# Patient Record
Sex: MALE | Employment: UNEMPLOYED | URBAN - METROPOLITAN AREA
[De-identification: names, ages, dates, MRNs, and addresses within clinical notes are randomized per-mention and may not be internally consistent; named-entity substitution may affect disease eponyms.]

---
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
## 2019-07-31 ENCOUNTER — HOSPITAL ENCOUNTER (OUTPATIENT)
Dept: PHYSICAL THERAPY | Age: 49
Discharge: HOME | End: 2019-07-31
Payer: COMMERCIAL

## 2019-07-31 PROCEDURE — 97163 PT EVAL HIGH COMPLEX 45 MIN: CPT

## 2019-07-31 PROCEDURE — 92523 SPEECH SOUND LANG COMPREHEN: CPT
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
## 2019-08-01 NOTE — PROGRESS NOTES
Outpatient Rehab Services  Referral Form/Physician Order  Central Scheduling Fax: [phone]  Speak to a :  Call [phone]   Please review and co-sign (electronically) OR sign and return to the below indicated clinic's fax number if you agree with this request.  Thank you! NAME:  Suzette Carter SSN:  [id number] :  1970   ADDRESS:  [address] [place] [address] [address]  [facility] [address] Daytime Phone:  [phone] (IYMJ)[phone] (mobile)    Diagnosis & Date of Onset:  TBI (traumatic brain injury) ([facility] [address] [S06.9X9A]   Dysphagia, pharyngeal R 13.13 Special Precautions:   Frequency:  [x] to be determined by therapist after evaluation   OR   ____ X per week X ____ weeks    Services    [] Evaluate & Treat  [] Special Orders________________________   [] Physical Therapy  [] Occupational Therapy   [] Speech Therapy  [x] MBS w/ Speech Therapy    Specialized Programs    [] Aquatic Therapy [] Lymphedema [] Oncology Rehab   [] Balance Rehab [] Parkinson's Program [] Osteoporosis   [] Fibromyalgia [] Motion Analysis [] Spine Rehab   [] Industrial Rehab [] Hand & Upper Extremity [] Sports Injury Rehab    [] Urinary Incontinence     Locations    @ [address] [place] [address] [address], [address]  [place] [address] [address] [facility]  Ph: [phone]  Fax: [phone] @ [address] [place] [address] [address] [facility],[address]  [place], [address] [facility] [address]  Ph: [phone]  Fax: [phone] @ [place] [address] Elpidio Rai, [address] [place] [address]  Ph: [phone]  Fax: [phone]        @ [address] [place] [address] Ave  [place], [address]  Ph: [phone]  Fax: [phone] @ [address] [place] [address], [facility] Jen Rai, [address] [facility] [address]   Ph: [phone]  Fax: [phone] @ Kendy Neri   [address]  [place], [address]  Ph: [phone]  Fax: [phone]        @ [address]  Ph: [phone]  Fax: [phone] @ [address] [place] [address] [address] [facility], [address] [place] [address]  Ph: [phone]  Fax: [phone] @ [facility]  [address] Kurt Ocasio   Ph: [phone]         @ [address] [facility] [address]  Ph: [phone]  Fax: [phone]      This section is not needed if signing electronically   I certify that I have examined the above patient and outpatient rehab services are medically necessary.    ___________________________________________           Signature of Physician/Provider    ___________________________________________            Practice Name   ______________________   Date    ______________________   Referral Contact

## 2019-08-02 ENCOUNTER — HOSPITAL ENCOUNTER (OUTPATIENT)
Dept: PHYSICAL THERAPY | Age: 49
Discharge: HOME | End: 2019-08-02

## 2019-08-02 PROCEDURE — 97167 OT EVAL HIGH COMPLEX 60 MIN: CPT
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
## 2019-08-05 ENCOUNTER — HOSPITAL ENCOUNTER (OUTPATIENT)
Dept: PHYSICAL THERAPY | Age: 49
Discharge: HOME | End: 2019-08-05

## 2019-08-05 PROCEDURE — 92507 TX SP LANG VOICE COMM INDIV: CPT

## 2019-08-05 PROCEDURE — 97110 THERAPEUTIC EXERCISES: CPT

## 2019-08-05 PROCEDURE — 97530 THERAPEUTIC ACTIVITIES: CPT

## 2019-08-05 NOTE — PROGRESS NOTES
Marli Fischer  : 1970  Primary: [employer]  Secondary:  [address] at [facility]  [place] [facility] [address], [address], [place], [facility]  [phone]   [phone]        OUTPATIENT SPEECH LANGUAGE PATHOLOGY: Daily Note: 1    ICD-10: Treatment Diagnosis: cognitive communication deficits R 41.841  REFERRING PHYSICIAN: Smita Palacios MD MD Orders: speech evaluate and treat  Return Physician Appointment:    PAST MEDICAL HISTORY: TBI, sleep apnea  MEDICAL/REFERRING DIAGNOSIS: TBI (traumatic brain injury) ([facility] [address] [S06.9X9A]  DATE OF ONSET:   PRIOR LEVEL OF FUNCTION: with spouse and children   PRECAUTIONS/ALLERGIES: NKDA   ASSESSMENT:  Pt with mod difficulties with naming 5 items in concrete categories this date. Perseverations observed on juices when asked to name other beverages. He complained of his head hurting once due to having to concentrate. Scheduled MBS for  at 10:00 am.  Informed pt and spouse they will need to arrive at 9:30. Understanding expressed. Patient will benefit from skilled intervention to address the above impairments. ?????? ? ? This section established at most recent assessment??????????  PROBLEM LIST (Impairments causing functional limitations):  1. Decreased cognition  2. Anomia   GOALS: (Goals have been discussed and agreed upon with patient.)  SHORT-TERM FUNCTIONAL GOALS: Time Frame: 3 months   1. Pt will name pictures with 80% accuracy. 2. Pt will complete word finding tasks with 80% accuracy. 3. Pt will complete convergent/divergent naming tasks with 80% accuracy. 4. Pt will state his address with only min cues needed. 5. Pt will state his phone number with only min cues needed. 6. Pt will state his childrens' names with only min cues needed. 7. Pt will complete immediate memory tasks with 80% accuracy. 8. Pt will complete STM tasks with 80% accuracy. 9. Pt will participate in a full cognitive assessment x1.     DISCHARGE GOALS: Time Frame: 4-5 months   1. Increased memory and expressive language skills needed for highest level of independent functioning. REHABILITATION POTENTIAL FOR STATED GOALS: FairPLAN OF CARE:  INTERVENTIONS PLANNED: (Benefits and precautions of therapy have been discussed with the patient.)  1. Cognitive tasks  2. Speech tasks  TREATMENT PLAN EFFECTIVE DATES: 7/31/2019 TO 10/30/2019 (90 days). FREQUENCY/DURATION: Continue to follow patient 2 times a week for 90 days to address above goals. Regarding [address] [place], [place] [address] therapy, I certify that the treatment plan above will be carried out by a therapist or under their direction. Thank you for this referral,  Carol [address] [address] [facility]                     Referring Physician Signature: Allen Barba MD     Date      SUBJECTIVE:  Pt cooperative. Spouse and  present. Present Symptoms: decreased cognition s/p TBI in 2016      Current Medications: see hard chart    Date Last Reviewed: 7/31/19  Social History/Home Situation: residing with spouse and their 5 children      Work/Activity History:  for [employer]     OBJECTIVE:  Objective Measure: Tool Used: National Outcomes Measurement System: Functional Communication Measures: SPOKEN LANGUAGE EXPRESSION  Score:  Initial: 5 Most Recent: X (Date: -- )   Interpretation of Tool: This measure describes the change in functional communication status subsequent to speech-language pathology treatment of patients who have spoken language expression deficits. o Level 1:  The individual attempts to speak, but verbalizations are not meaningful to familiar or unfamiliar communication partners at any time. o Level 2:  The individual attempts to speak, although few attempts are accurate or appropriate.   The communication partner must assume responsibility for structuring the communication exchange, and with consistent and maximal cueing, the individual can only occasionally produce automatic and/or imitative words and phrases that are rarely meaningful in context.  o Level 3:  The communication partner must assume responsibility for structuring the communication exchange, and with consistent and moderate cueing, the individual can produce words and phrases that are appropriate and meaningful in context.  o Level 4:  The individual is successfully able to initiate communication using spoken language in simple, structured conversations in routine daily activities with familiar communication partners. The individual usually requires moderate cueing, but is able to demonstrate use of simple sentences (i.e., semantics, syntax, and morphology) and rarely uses complex sentences/messages. o Level 5:  The individual is successfully able to initiate communication using spoken language in structured conversations with both familiar and unfamiliar communication partners. The individual occasionally requires minimal cueing to frame more complex sentences in messages. The individual occasionally self-cues when encountering difficulty. o Level 6:  The individual is successfully able to communicate in most activities, but some limitations in spoken language are still apparent in vocational, avocational, and social activities. The individual rarely requires minimal cueing to frame complex sentences. The individual usually self-cues when encountering difficulty. o Level 7: The individuals ability to successfully and independently participate in vocational, avocational, and social activities is not limited by spoken language skills. Independent functioning may occasionally include use of self-cueing. Score Level 7 Level 6 Level 5 Level 4 Level 3 Level 2 Level 1   Modifier CH CI CJ CK CL CM CN     Mental Status:  Alert    Neuro-Linguistics:  Divergent naming: pt named 5 items at a concrete level 40% of the time. Accuracy increased to 50% of the time.     Cognitive Skills Activities: Activities/Procedures listed utilized to improve and/or restore cognitive function as related to thought organization. Required moderate verbal cueing to improve improve recall of information. __________________________________________________________________________________________________  History of Present Injury/Illness (Reason for Referral):    Treatment Assessment:   . Progression/Medical Necessity:   · Skilled intervention continues to be required due to decreased communication with family/caregivers and decreased cognitive skills. Compliance with Program/Exercises: Will assess as treatment progresses}. Reason for Continuation of Services/Other Comments:  · Patient continues to require skilled intervention due to decreased cognition, anomia. Recommendations/Intent for next treatment session: \"Treatment next visit will focus on cognitive, speech tasks\".      Total Treatment Duration:  Time In: 0810  Time Out: [address] [place] [address] 43., CCC-SLP

## 2019-08-05 NOTE — PROGRESS NOTES
Adriana Huntsville  : 1970  Primary: [employer]  Secondary:  [address]  at [facility]  [address], [address], [place], [facility]  [phone]   [phone]        OUTPATIENT PHYSICAL THERAPY: Daily Treatment Note 2019  Visit Count:  4    ICD-10: Treatment Diagnosis: Unsteadiness on feet (R26.81)  Other lack of coordination (R27.8)  Repeated falls (R29.6)    Pre-treatment Symptoms/Complaints:  Patient present with wife and . He is vocal with questions asked. Pain: Initial:   no number given, but complains of low back in certain positions. Post Session:  Some better, but still no number. Medications Last Reviewed:  2019  Updated Objective Findings:  None Today  TREATMENT:     THERAPEUTIC ACTIVITY: ( 25 minutes): Therapeutic activities per grid below to improve mobility, strength and coordination. Date:  19 Date:   Date:     Activity/Exercise Parameters Parameters Parameters   Rhythmic facilitation to decrease tone  20 minutes Rocking with knees bent,   Legs straight, and in sitting      Transfers - to and from mat  Mod A of 1      Bed mobility  Rolling                                 THERAPEUTIC EXERCISE: (20 minutes):  Exercises per grid below to improve mobility, strength and coordination. Date:  19 Date:   Date:     Activity/Exercise Parameters Parameters Parameters   Single knee to chest  3 x 20 sec B with therapist assist     Hamstring stretch  3 x 20  With therapist assist      Lower trunk rotation  5 x 10 sec B with therapist assist      Long arc Quads X 10 B                           [employer] Portal  Treatment/Session Summary:  Patient reports through  to be more relaxed. With rhythmic rocking and continually verbal cues to relax and breath, patient did relax slightly and had decreased tremors.    · Response to Treatment:  some low back pain, but no more than normal. Good session to try and decrease tremors and tone. .  · Communication/Consultation:  None today   · Equipment provided today:  None today  · Recommendations/Intent for next treatment session: Next visit will focus on decreasing tremors, stretching, and transfers.      Total Treatment Billable Duration:  45 minutes  PT Patient Time In/Time Out  Time In: 0845  Time Out: 2476 Francis Montemayor PTA    Future Appointments   Date Time Provider Sara Trinidad   8/9/2019  8:00 AM Flonndriss Del Castillo, OTR/L [facility]   8/9/2019  8:45 AM Dajuan Das SLP [facility]   8/12/2019  8:45 AM Mara ALTMAN PTA [facility]   8/14/2019  8:45 AM Marah Yanez DPT [facility]   8/14/2019  9:30 AM Lucrezia Laundry, OTD, OTR/L [facility]   8/19/2019  8:45 AM Mateo Nunez PTA [facility]   8/19/2019  9:30 AM Lucrezia Laundry[facility] OTD[facility]   8/19/2019 10:15 AM Dajuan Das SLP [facility]   8/21/2019  9:30 AM Marah Yanez DPT [facility]   8/21/2019 10:15 AM Lucrezia Laundry, OTD, [facility]   8/21/2019 11:00 AM D[facility] Das [facility]   8/26/2019  8:45 AM Dajuan Das SLP [facility]   8/26/2019  9:30 AM Lucrezia Laundry, OTD, OTR/L [facility]   8/26/2019 10:15 AM Marah Yanez DPT [facility]   8/27/2019 10:00 AM SFD XR RF ROOM 3 [facility]AD D   8/28/2019  8:45 AM Lucrezia Laundry, OTD, [facility]TR[facility]   8/28/2019  9:30 AM Marah Yanez DPT [facility] SFD   8/28/2019 10:15 AM Venu Stewart, SLP [facility]

## 2019-08-09 ENCOUNTER — HOSPITAL ENCOUNTER (OUTPATIENT)
Dept: PHYSICAL THERAPY | Age: 49
Discharge: HOME | End: 2019-08-09

## 2019-08-09 PROCEDURE — 97530 THERAPEUTIC ACTIVITIES: CPT

## 2019-08-09 PROCEDURE — 92507 TX SP LANG VOICE COMM INDIV: CPT

## 2019-08-09 NOTE — PROGRESS NOTES
Víctor Newell  : 1970  Primary: [employer]  Secondary:  [address]  at [facility]  [address], [address], [place] [address] [address] [facility]  [phone]   [phone]        OUTPATIENT SPEECH LANGUAGE PATHOLOGY: Daily Note: 2    ICD-10: Treatment Diagnosis: cognitive communication deficits R 41.841  REFERRING PHYSICIAN: Doron Whitney MD MD Orders: speech evaluate and treat  Return Physician Appointment:    PAST MEDICAL HISTORY: TBI, sleep apnea  MEDICAL/REFERRING DIAGNOSIS: TBI (traumatic brain injury) ([facility]) [S06.9X9A]  DATE OF ONSET:   PRIOR LEVEL OF FUNCTION: with spouse and children   PRECAUTIONS/ALLERGIES: NKDA   ASSESSMENT:  Pt with significant memory impairments as he wasn't able to recall attending OT and he had OT immediately prior to [address] [place] [address] . He recalled 3/5 of his childrens' names this date. His spouse said the pt didn't ever really have any formal education but he did teach himself to read and write a little. Suggested for pt to listen to books on tape for comprehension, attention and conversation. She reported the children do read to the patient some. Also suggested for the pt's spouse to keep a journal of daily activities for assisting with recall. Understanding expressed. Patient will benefit from skilled intervention to address the above impairments. ?????? ? ? This section established at most recent assessment??????????  PROBLEM LIST (Impairments causing functional limitations):  1. Decreased cognition  2. Anomia   GOALS: (Goals have been discussed and agreed upon with patient.)  SHORT-TERM FUNCTIONAL GOALS: Time Frame: 3 months   1. Pt will name pictures with 80% accuracy. 2. Pt will complete word finding tasks with 80% accuracy. 3. Pt will complete convergent/divergent naming tasks with 80% accuracy. 4. Pt will state his address with only min cues needed. 5. Pt will state his phone number with only min cues needed.   6. Pt will state his childrens' names with only min cues needed. 7. Pt will complete immediate memory tasks with 80% accuracy. 8. Pt will complete STM tasks with 80% accuracy. 9. Pt will participate in a full cognitive assessment x1. DISCHARGE GOALS: Time Frame: 4-5 months   1. Increased memory and expressive language skills needed for highest level of independent functioning. REHABILITATION POTENTIAL FOR STATED GOALS: FairPLAN OF CARE:  INTERVENTIONS PLANNED: (Benefits and precautions of therapy have been discussed with the patient.)  1. Cognitive tasks  2. Speech tasks  TREATMENT PLAN EFFECTIVE DATES: 7/31/2019 TO 10/30/2019 (90 days). FREQUENCY/DURATION: Continue to follow patient 2 times a week for 90 days to address above goals. Regarding [address] [place], [place] [address] therapy, I certify that the treatment plan above will be carried out by a therapist or under their direction. Thank you for this referral,  Carol [address] [address] [facility]                     Referring Physician Signature: Nathan Solo MD     Date      SUBJECTIVE:  Pt cooperative. Spouse and  present. Present Symptoms: decreased cognition s/p TBI in 2016      Current Medications: see hard chart    Date Last Reviewed: 8/9/19  Social History/Home Situation: residing with spouse and their 5 children      Work/Activity History:  for [employer]     OBJECTIVE:  Objective Measure: Tool Used: National Outcomes Measurement System: Functional Communication Measures: SPOKEN LANGUAGE EXPRESSION  Score:  Initial: 5 Most Recent: X (Date: -- )   Interpretation of Tool: This measure describes the change in functional communication status subsequent to speech-language pathology treatment of patients who have spoken language expression deficits. o Level 1:  The individual attempts to speak, but verbalizations are not meaningful to familiar or unfamiliar communication partners at any time.   o Level 2:  The individual attempts to speak, although few attempts are accurate or appropriate. The communication partner must assume responsibility for structuring the communication exchange, and with consistent and maximal cueing, the individual can only occasionally produce automatic and/or imitative words and phrases that are rarely meaningful in context.  o Level 3:  The communication partner must assume responsibility for structuring the communication exchange, and with consistent and moderate cueing, the individual can produce words and phrases that are appropriate and meaningful in context.  o Level 4:  The individual is successfully able to initiate communication using spoken language in simple, structured conversations in routine daily activities with familiar communication partners. The individual usually requires moderate cueing, but is able to demonstrate use of simple sentences (i.e., semantics, syntax, and morphology) and rarely uses complex sentences/messages. o Level 5:  The individual is successfully able to initiate communication using spoken language in structured conversations with both familiar and unfamiliar communication partners. The individual occasionally requires minimal cueing to frame more complex sentences in messages. The individual occasionally self-cues when encountering difficulty. o Level 6:  The individual is successfully able to communicate in most activities, but some limitations in spoken language are still apparent in vocational, avocational, and social activities. The individual rarely requires minimal cueing to frame complex sentences. The individual usually self-cues when encountering difficulty. o Level 7: The individuals ability to successfully and independently participate in vocational, avocational, and social activities is not limited by spoken language skills. Independent functioning may occasionally include use of self-cueing.   Score Level 7 Level 6 Level 5 Level 4 Level 3 Level 2 Level 1   Modifier [facility] CK CL CM CN     Mental Status:  Alert    Neuro-Linguistics:  Stating his address: didn't know it. When asked what [address] he [facility] [address] when asked about city and state. Unable to recall attending OT this date or any activities completed in OT. Unable to recall leaving the house yesterday though his spouse said they did go somewhere. Stating children's names: stated 3/5 names. Childrens' names are: Agueda Lucianoryan rothman Pieter  Stated the children ride the bus at times but his wife picks them up daily. Orientation: stated this is July. Cognitive Skills Activities: Activities/Procedures listed utilized to improve and/or restore cognitive function as related to thought organization. Required moderate verbal cueing to improve improve recall of information. __________________________________________________________________________________________________  History of Present Injury/Illness (Reason for Referral):    Treatment Assessment:   . Progression/Medical Necessity:   · Skilled intervention continues to be required due to decreased communication with family/caregivers and decreased cognitive skills. Compliance with Program/Exercises: Will assess as treatment progresses}. Reason for Continuation of Services/Other Comments:  · Patient continues to require skilled intervention due to decreased cognition, anomia. Recommendations/Intent for next treatment session: \"Treatment next visit will focus on cognitive, speech tasks\".      Total Treatment Duration:  Time In: 0845  Time Out: [address] [facility], [facility] 43., CCC-SLP

## 2019-08-12 ENCOUNTER — HOSPITAL ENCOUNTER (OUTPATIENT)
Dept: PHYSICAL THERAPY | Age: 49
Discharge: HOME | End: 2019-08-12

## 2019-08-12 PROCEDURE — 97530 THERAPEUTIC ACTIVITIES: CPT

## 2019-08-12 PROCEDURE — 97110 THERAPEUTIC EXERCISES: CPT

## 2019-08-12 NOTE — PROGRESS NOTES
Suzette Carter  : 1970  Primary: [employer]  Secondary:  [address]  at [facility]  [address], [address], [place], [facility]  [phone]   [phone]        OUTPATIENT PHYSICAL THERAPY: Daily Treatment Note 2019  Visit Count:  7    ICD-10: Treatment Diagnosis: Unsteadiness on feet (R26.81)  Other lack of coordination (R27.8)  Repeated falls (R29.6)    Pre-treatment Symptoms/Complaints:  Patient present with wife and no . He and his wife both wanted to do therapy without . Pain: Initial:   no number given, but complains of low back in certain positions. Post Session:  Some better, but still no number. Medications Last Reviewed:  2019  Updated Objective Findings:  None Today  TREATMENT:     THERAPEUTIC ACTIVITY: ( 20 minutes): Therapeutic activities per grid below to improve mobility, strength and coordination. Date:  19 Date:  19 Date:     Activity/Exercise Parameters Parameters Parameters   Rhythmic facilitation to decrease tone  20 minutes Rocking with knees bent,   Legs straight, and in sitting  10 min -     Transfers - to and from mat  Mod A of 1  X 2 mod A of 1    Bed mobility  Rolling  Supine to sit to supine   X 5                                THERAPEUTIC EXERCISE: (23 minutes):  Exercises per grid below to improve mobility, strength and coordination.        Date:  19 Date:  19 Date:     Activity/Exercise Parameters Parameters Parameters   Single knee to chest  3 x 20 sec B with therapist assist     Hamstring stretch  3 x 20  With therapist assist      Lower trunk rotation  5 x 10 sec B with therapist assist  With rocking and compression     Long arc Quads X 10 B  X 10 B with slight compression at shoulders    Side lying - hip abduction   2 x 5 B     Side lying - R hip flexor stretch   3 x 15 sec hold with slight stretch     Seated at edge of mat   Raising arms - alternating sides with slight compression at shoulders    Sit to stand   X 5        [employer] Portal  Treatment/Session Summary:  Patient's wife assisted with translating. Good tactile cues to communicate. Patient still requires lost of cues to relax and breathe during session. Patient with a good amount of pain in low back on right side and in the middle. · Response to Treatment:  Patient with some relief with tremors, but still has pain in back during certain exercises. .  · Communication/Consultation:  None today   · Equipment provided today:  None today  · Recommendations/Intent for next treatment session: Next visit will focus on decreasing tremors, stretching, and transfers.      Total Treatment Billable Duration:  43 minutes  PT Patient Time In/Time Out  Time In: 7733  Time Out: Bhargav Friedman PTA    Future Appointments   Date Time Provider Sara Abdi   8/14/2019  8:45 AM [facility]   8/14/2019  9:30 AM Karely Abshonda, OTD[facility]   8/19/2019  8:45 AM Dafne ALTMAN PTA [facility]FD   8/19/2019  9:30 AM Karely Abelson, OTD, OTR/L [facility] SFD   8/19/2019 10:15 AM Paty Das SLP [facility] D   8/21/2019  9:30 AM Jazmin Balloon, DPT [facility] D   8/21/2019 10:15 AM Karley Abshonda, OTD, OTR/L [facility]FD   8/21/2019 11:00 AM Paty Pires, SLP [facility]FD   8/26/2019  8:45 AM Paty Das, SLP [facility]FD   8/26/2019  9:30 AM [facility]onda[facility]TD[facility]   8/26/2019 10:15 AM Jazmin Balloon, DPT [facility] D   8/27/2019 10:00 AM SFAGAPITO XR RF ROOM 3 SFDRAD SFD   8/28/2019  8:45 AM Karely Abelson, OTD, OTR/L [facility] SFD   8/28/2019  9:30 AM Jazmin Balloon, DPT [facility] SFD   8/28/2019 10:15 AM CHRIS [facility] [place]

## 2019-08-14 ENCOUNTER — HOSPITAL ENCOUNTER (OUTPATIENT)
Dept: PHYSICAL THERAPY | Age: 49
Discharge: HOME | End: 2019-08-14

## 2019-08-14 PROCEDURE — 97542 WHEELCHAIR MNGMENT TRAINING: CPT

## 2019-08-14 PROCEDURE — 97110 THERAPEUTIC EXERCISES: CPT

## 2019-08-14 PROCEDURE — 97530 THERAPEUTIC ACTIVITIES: CPT

## 2019-08-14 NOTE — PROGRESS NOTES
Belkisamy Harmon  : 1970  Primary: [employer]  Secondary:  [address]  at [facility]  [address], [address], [place], [facility]  [phone]   [phone]        OUTPATIENT PHYSICAL THERAPY: Daily Treatment Note 2019  Visit Count:  8    ICD-10: Treatment Diagnosis: Unsteadiness on feet (R26.81)  Other lack of coordination (R27.8)  Repeated falls (R29.6)    Pre-treatment Symptoms/Complaints:  Patient reports pain in neck and low back but doesn't give a number rating. No  present today. Pain: Initial:   no number given, but complains of low back in certain positions. Post Session:  Some better, but still no number. Medications Last Reviewed:  2019  Updated Objective Findings:  None Today  TREATMENT:     THERAPEUTIC ACTIVITY: ( 30 minutes): Therapeutic activities per grid below to improve mobility, strength and coordination. Date:  19 Date:  19 Date:  19   Activity/Exercise Parameters Parameters Parameters   Rhythmic facilitation to decrease tone  20 minutes Rocking with knees bent,   Legs straight, and in sitting  10 min -     Transfers - to and from mat  Mod A of 1  X 2 mod A of 1 Min A x 1 person   Bed mobility  Rolling  Supine to sit to supine   X 5  Min A to supervision    Sit to stand   Min A at walker and bars   Static standing   2 x 30 sec at walker,  X 30 sec at bars   Standing marching   3 x 10 reps in parallel bars   Ambulation    2 x 10 feel in bars  3 x 50' with rolling walker, 2 person assist +wheelchair follow for safety      THERAPEUTIC EXERCISE: (10 minutes):  Exercises per grid below to improve mobility, strength and coordination.        Date:  19 Date:  19 Date:  19   Activity/Exercise Parameters Parameters Parameters   Single knee to chest  3 x 20 sec B with therapist assist  Attempted but too much tone fighting   Hamstring stretch  3 x 20  With therapist assist   Attempted but patient unable to relax   Lower trunk rotation  5 x 10 sec B with therapist assist  With rocking and compression  Through small ROM- increased pain   Long arc Quads X 10 B  X 10 B with slight compression at shoulders X 2 reps B through small ROM   Side lying - hip abduction   2 x 5 B     Side lying - R hip flexor stretch   3 x 15 sec hold with slight stretch     Seated at edge of mat   Raising arms - alternating sides with slight compression at shoulders    Sit to stand   X 5        [facility] Portal  Treatment/Session Summary:  Patient's wife assisted with translating. Patient ambulated very well with rolling walker, still significant tremoring and required 2 person min A for safety. Treatment was limited due to no  present from workers comp. He continues to benefit from skilled PT to improve functional mobility. · Response to Treatment:  Patient with some relief with tremors, but still has pain in back during certain exercises. .  · Communication/Consultation:  None today   · Equipment provided today:  None today  · Recommendations/Intent for next treatment session: Next visit will focus on decreasing tremors, stretching, and transfers.      Total Treatment Billable Duration:  40 minutes  PT Patient Time In/Time Out  Time In: 0845  Time Out: JAKE Samuel    Future Appointments   Date Time Provider Sara Abdi   8/19/2019  8:45 AM Harris Camarillo, PTA [facility] SFD   8/19/2019  9:30 AM JADA Bose, OTR/L [facility] SFD   8/19/2019 10:15 AM Malinda Das SLP [facility] SFD   8/21/2019  9:30 AM Wendy Garcia DPT [facility] SFD   8/21/2019 10:15 AM JADA Bose, OTR/L [facility] SFD   8/21/2019 11:00 AM Malinda Das SLP [facility] SFD   8/26/2019  8:45 AM Malinda Das SLP [facility] SFD   8/26/2019  9:30 AM JADA Bose, OTR/L [facility] SFD   8/26/2019 10:15 AM Wendy Garcia DPT [facility] SFD   8/27/2019 10:00 AM SFAGAPITO XR RF ROOM 3 SFDRAD SFD   8/28/2019 8:45 AM GERALD Baker/L [facility] SFD   8/28/2019  9:30 AM Anju Kendrick DPT [facility] SFD   8/28/2019 10:15 AM Modesto Hernandes, CHRIS [facility]

## 2019-08-14 NOTE — PROGRESS NOTES
Jayson Hayes  : 1970  Primary: [employer]  Secondary:  [address] at [facility]  [address], [address], [place] [address] [address] [facility]  [phone]   [phone]      OUTPATIENT OCCUPATIONAL THERAPY: Daily Treatment Note 2019  Visit Count:  9    ICD-10: Treatment Diagnosis:  Unspecified lack of coordination (R27.9); Dependence on wheelchair (Z99.3); History of falling (Z91.81)  Precautions/Allergies:   Patient has no allergy information on record. TREATMENT PLAN:  Effective Dates: 2019 TO 11/3/2019 (90 days). Frequency/Duration: 2 times a week for 90 Day(s)    Pre-treatment Symptoms/Complaints: Interpretor present. Patient states he would like to try the power w/c. States it hurts when he tries to walk. Pain: Initial: Pain Intensity 1: 8  Pain Location 1: (\"back of my head\" and \"waist\")  Post Session:  same/10   Medications Last Reviewed:  2019   Updated Objective Findings:  None Today   TREATMENT:     Therapeutic Activity: (    15 minutes): Therapeutic activities including Bed transfers, Chair transfers and w/c transfers to improve mobility, strength, balance and coordination. Required moderate   to promote static and dynamic balance in sitting and promote coordination of bilateral, upper extremity(s), trunk. Patient sat edge of mat ~5 minutes. Patient assisted to and from the w/c to mat/mat to w/c twice each from tilt in space w/c to familia w/c with assistance to manage leg rests and brakes. FUNCTIONAL MOBILITY:   Transfers:   Wheelchair to [employer]: Stand pivot transfer to the L with minimal assistance. Mat to wheelchair. Stand pivot transfer to the R with minimal assistance. Sit to Stand: Minimal assistance. Wheelchair Management and Training: ( 15 minutes): Procedure(s) utilized to improve and/or restore functioning as related to power wheelchair mobility and seating/positioning.  Required minimal visual and verbal cueing to facilitate ability to navigate trial power and/or manual wheelchair through multiple environments as would be expected in a home or community environment. Patient trialed Perm[employer]il M3 power w/c. He was able to operate power tilt and recline to his comfort level with minimal assistance. Equipment Trial (use \".outdoor\" to document outdoor training):   INDOOR TRAINING:  [x] YES [] NO Cause and effect concepts while in the wheelchair (i.e. Activating a switch causes the wheelchair to move)   [x] YES [] NO Stop and go concepts while in the wheelchair (i.e. \"stop and go\" verbal instructions, or consistently stopping for objects)   [x] YES [] NO Directional concepts while in the wheelchair (i.e. moving the joystick in different directions to move the wheelchair in different directions)   [x] YES [] NO Ability to follow directions: (i.e. Following varying verbal commands in a safe and timely manner)   [x] YES [] NO Adequate visual functioning to safely drive indoors (i.e. Visual attention to environment and ability to avoid obstacles)   [x] YES [] NO Adequate problem solving ability (i.e. Maneuver power wheelchair to designated destination without verbal cues)   [x] YES [] NO Ability to use access method with adequate activation, sustained contact and release (i.e. Able to access switch, control contact, and release when ready to stop wheelchair)   [] YES [] NO Ability to change drives and or speeds as appropriate for environment (i.e. Decreasing speed in high traffic areas)   [] YES [] NO Client ready to complete outdoor portion of power wheelchair mobility assessment   [] YES [] NO Small space maneuverability (room)   [x] YES [] NO Large space maneuverability (hallway)   [x] YES [] NO Accessing Doorways   COMMENTS:  Patient made good adjustments going in/out of power tilt and adjusting speed via joystick/key pad.       [facility] Portal  Treatment/Session Summary:  Patient trialed driving a power w/c demonstrating good problem solving skills making adjustments when needed making turns, driving backwards and going through doorways. Plan to continue trialing with advancement to challenges with power w/c mobility. Continue OT per plan of care. · Response to Treatment:  tolerated without complications. · Communication/Consultation:  None today  · Equipment provided today:  None today  · Recommendations/Intent for next treatment session: Next visit will focus on advancement to more challenging activities. .    Total Treatment Billable Duration:  30 minutes  OT Patient Time In/Time Out  Time In: 0945  Time Out: [address] [facility] 98, OTD, OTR/L    Future Appointments   Date Time Provider Sara Trinidad   8/19/2019  8:45 AM Neil Mcclellan PTA [facility] SFD   8/19/2019  9:30 AM Keagan Domingo OTD, OTR/L [facility] SFD   8/19/2019 10:15 AM Hanane Das SLP [facility] SFD   8/21/2019  9:30 AM Eligio Carlin DPT [facility] SFD   8/21/2019 10:15 AM Keagan Domingo OTD, OTR/L [facility] SFD   8/21/2019 11:00 AM Hanane Das SLP [facility] SFD   8/26/2019  8:45 AM Hanane Das, SLP [facility] SFD   8/26/2019  9:30 AM Keagan Domingo OTD, OTR/L [facility] SFD   8/26/2019 10:15 AM Eligio Carlin DPT [facility] SFD   8/27/2019 10:00 AM CARL XR RF ROOM 3 SFDRAD SFD   8/28/2019  8:45 AM Keagan Domingo OTD, OTR/L [facility] SFD   8/28/2019  9:30 AM Eligio Carlin DPT [facility] SFD   8/28/2019 10:15 AM CHRIS Quick [facility] Stephany Martinez

## 2019-08-19 ENCOUNTER — HOSPITAL ENCOUNTER (OUTPATIENT)
Dept: PHYSICAL THERAPY | Age: 49
Discharge: HOME | End: 2019-08-19

## 2019-08-19 PROCEDURE — 97542 WHEELCHAIR MNGMENT TRAINING: CPT

## 2019-08-19 PROCEDURE — 97530 THERAPEUTIC ACTIVITIES: CPT

## 2019-08-19 PROCEDURE — 92507 TX SP LANG VOICE COMM INDIV: CPT

## 2019-08-19 NOTE — PROGRESS NOTES
Reva Boggs  : 1970  Primary: [employer]  Secondary:  [address]  at [facility]  [address], [address], [place], [address] [facility]  [phone]   [phone]        OUTPATIENT PHYSICAL THERAPY: Daily Treatment Note 2019  Visit Count:  10    ICD-10: Treatment Diagnosis: Unsteadiness on feet (R26.81)  Other lack of coordination (R27.8)  Repeated falls (R29.6)    Pre-treatment Symptoms/Complaints:  Patient presents with wife and . Patient's wife reports she gave him a muscle relaxer this morning. Pain: Initial:   no number given, but complains of low back in certain positions. Post Session:  Some better, but still no number. Medications Last Reviewed:  2019  Updated Objective Findings:  None Today  TREATMENT:     THERAPEUTIC ACTIVITY: ( 50 minutes): Therapeutic activities per grid below to improve mobility, strength and coordination.        Date:  19 Date:  19 Date:  19 Date:  19   Activity/Exercise Parameters Parameters Parameters    Rhythmic facilitation to decrease tone  20 minutes Rocking with knees bent,   Legs straight, and in sitting  10 min -      Transfers - to and from mat  Mod A of 1  X 2 mod A of 1 Min A x 1 person    Bed mobility  Rolling  Supine to sit to supine   X 5  Min A to supervision     Sit to stand   Min A at walker and bars Min A - throughout session to ll bars and walker    Static standing   2 x 30 sec at walker,  X 30 sec at bars 4 x 30 sec    Standing marching   3 x 10 reps in parallel bars X 10 B in ll bars    Ambulation    2 x 10 feel in bars  3 x 50' with rolling walker, 2 person assist +wheelchair follow for safety 4 x 10 feet in ll bars with assist of 1    Then with rolling walker   2 x 40 feet with assist of 2 and wheelchair follow   Standing in ll bars - tapping foot forward and back     2 x 5 B and assist of 1   Standing in ll bars - tapping foot to side and back     X 5 B then 2 more reps each side  With assist of 1 and 1 sitting rest break              THERAPEUTIC EXERCISE: ( minutes):  Exercises per grid below to improve mobility, strength and coordination. Date:  8-5-19 Date:  8-12-19 Date:  8/14/19   Activity/Exercise Parameters Parameters Parameters   Single knee to chest  3 x 20 sec B with therapist assist  Attempted but too much tone fighting   Hamstring stretch  3 x 20  With therapist assist   Attempted but patient unable to relax   Lower trunk rotation  5 x 10 sec B with therapist assist  With rocking and compression  Through small ROM- increased pain   Long arc Quads X 10 B  X 10 B with slight compression at shoulders X 2 reps B through small ROM   Side lying - hip abduction   2 x 5 B     Side lying - R hip flexor stretch   3 x 15 sec hold with slight stretch     Seated at edge of mat   Raising arms - alternating sides with slight compression at shoulders    Sit to stand   X 5        [facility] Portal  Treatment/Session Summary:  Patient encouraged to ask doctor and try tylenol or ibuprofen before therapy to see if it helps with controlling pain during standing activities. Still requires assist of 2 for rolling walker due to safety. He continues to benefit from skilled PT to improve functional mobility. · Response to Treatment:  Patient with some relief with tremors, but still has pain in back during certain exercises. .  · Communication/Consultation:  None today   · Equipment provided today:  None today  · Recommendations/Intent for next treatment session: Next visit will focus on decreasing tremors, stretching, and transfers.      Total Treatment Billable Duration:  50 minutes   PT Patient Time In/Time Out  Time In: 0840  Time Out: [address] [place] [facility] [place]    Future Appointments   Date Time Provider Sara Abdi   8/21/2019  9:30 AM Gilberto Nixon DPT [facility]   8/21/2019 10:15 AM JADA Goetz, JOSER/L [facility] SFD   8/21/2019 11:00 AM Merced Das, SLP [facility]PT D   8/26/2019  8:45 AM Cherrie Das, SLP [facility] D   8/26/2019  9:30 AM Jenifer Hart[facility]   8/26/2019 10:15 AM JAKE ValladaresDORPT D   8/27/2019 10:00 AM SFD XR RF ROOM 3 SFDRAD D   8/28/2019  8:45 AM Jenifer Hart, OTD, OTR/L [facility] D   8/28/2019  9:30 AM JAKE ValladaresDORPT D   8/28/2019 10:15 AM CHRIS [facility] Lacey Cortes

## 2019-08-19 NOTE — PROGRESS NOTES
Giuseppe Gotti  : 1970  Primary: [employer]  Secondary:  [address]  at [facility]  [address], [address], [place] [address] [address] [facility]  [phone]   [phone]        OUTPATIENT SPEECH LANGUAGE PATHOLOGY: Daily Note: 3    ICD-10: Treatment Diagnosis: cognitive communication deficits R 41.841  REFERRING PHYSICIAN: Brooke Valdivia MD MD Orders: speech evaluate and treat  Return Physician Appointment:    PAST MEDICAL HISTORY: TBI, sleep apnea  MEDICAL/REFERRING DIAGNOSIS: TBI (traumatic brain injury) ([facility] [address] [S06.9X9A]  DATE OF ONSET:   PRIOR LEVEL OF FUNCTION: with spouse and children   PRECAUTIONS/ALLERGIES: NKDA   ASSESSMENT:  Pt recalled trying a new wheelchair in OT this morning. He reported he felt comfortable in the chair. He reported they did start using a journal and that he has to write what he has done during the day and sometimes he forgets. His spouse reported she is writing in the journal as the pt cannot write due to tremors. She reported they write in the afternoon what he did throughout the day. He doesn't recall what they did the day prior when his spouse asks. She does then show him what they did. Pt with significant difficulties with unscrambling sentences this date. Patient will benefit from skilled intervention to address the above impairments. ?????? ? ? This section established at most recent assessment??????????  PROBLEM LIST (Impairments causing functional limitations):  1. Decreased cognition  2. Anomia   GOALS: (Goals have been discussed and agreed upon with patient.)  SHORT-TERM FUNCTIONAL GOALS: Time Frame: 3 months   1. Pt will name pictures with 80% accuracy. 2. Pt will complete word finding tasks with 80% accuracy. 3. Pt will complete convergent/divergent naming tasks with 80% accuracy. 4. Pt will state his address with only min cues needed. 5. Pt will state his phone number with only min cues needed.   6. Pt will state his childrens' names with only min cues needed. 7. Pt will complete immediate memory tasks with 80% accuracy. 8. Pt will complete STM tasks with 80% accuracy. 9. Pt will participate in a full cognitive assessment x1. DISCHARGE GOALS: Time Frame: 4-5 months   1. Increased memory and expressive language skills needed for highest level of independent functioning. REHABILITATION POTENTIAL FOR STATED GOALS: FairPLAN OF CARE:  INTERVENTIONS PLANNED: (Benefits and precautions of therapy have been discussed with the patient.)  1. Cognitive tasks  2. Speech tasks  TREATMENT PLAN EFFECTIVE DATES: 7/31/2019 TO 10/30/2019 (90 days). FREQUENCY/DURATION: Continue to follow patient 2 times a week for 90 days to address above goals. Regarding [address] therapy, I certify that the treatment plan above will be carried out by a therapist or under their direction. Thank you for this referral,  Kristen Lynch, [facility] MED[facility], [facility], [address] [facility], [place]                     Referring Physician Signature: Forrest Marte MD     Date      SUBJECTIVE:  Pt cooperative. Spouse and  present. Present Symptoms: decreased cognition s/p TBI in 2016      Current Medications: see hard chart    Date Last Reviewed: 8/9/19  Social History/Home Situation: residing with spouse and their 5 children      Work/Activity History:  for [employer]     OBJECTIVE:  Objective Measure: Tool Used: National Outcomes Measurement System: Functional Communication Measures: SPOKEN LANGUAGE EXPRESSION  Score:  Initial: 5 Most Recent: X (Date: -- )   Interpretation of Tool: This measure describes the change in functional communication status subsequent to speech-language pathology treatment of patients who have spoken language expression deficits. o Level 1:  The individual attempts to speak, but verbalizations are not meaningful to familiar or unfamiliar communication partners at any time.   o Level 2:  The individual attempts to speak, although few attempts are accurate or appropriate. The communication partner must assume responsibility for structuring the communication exchange, and with consistent and maximal cueing, the individual can only occasionally produce automatic and/or imitative words and phrases that are rarely meaningful in context.  o Level 3:  The communication partner must assume responsibility for structuring the communication exchange, and with consistent and moderate cueing, the individual can produce words and phrases that are appropriate and meaningful in context.  o Level 4:  The individual is successfully able to initiate communication using spoken language in simple, structured conversations in routine daily activities with familiar communication partners. The individual usually requires moderate cueing, but is able to demonstrate use of simple sentences (i.e., semantics, syntax, and morphology) and rarely uses complex sentences/messages. o Level 5:  The individual is successfully able to initiate communication using spoken language in structured conversations with both familiar and unfamiliar communication partners. The individual occasionally requires minimal cueing to frame more complex sentences in messages. The individual occasionally self-cues when encountering difficulty. o Level 6:  The individual is successfully able to communicate in most activities, but some limitations in spoken language are still apparent in vocational, avocational, and social activities. The individual rarely requires minimal cueing to frame complex sentences. The individual usually self-cues when encountering difficulty. o Level 7: The individuals ability to successfully and independently participate in vocational, avocational, and social activities is not limited by spoken language skills. Independent functioning may occasionally include use of self-cueing.   Score Level 7 Level 6 Level 5 Level 4 Level 3 Level 2 Level 1   Modifier CH CI CJ CK CL CM CN     Mental Status:  Alert    Neuro-Linguistics:  Stating his address: stated [address] Stated [address] [facility]. Didn't recall the zip code. Stating children's names: stated 3/5 names. Childrens' names are: Skylar Lynn  Orientation: stated this is Tuesday. Knew August.    Recalling therapy: recalled OT and that he tried a new wheelchair. Stated this is the 2nd therapy vs the 3rd. Recalled walking around the track in PT this date. Recalled today was Karlos's 1st day of school. Stated he is in 7th grade. However, he is in 8th. Per the interpretor, they discussed about 15 minutes ago that today was Karlos's first day of school. When asked if he had breakfast he said no. His spouse said he did eat breakfast.  She stated the pt frequently states he hasn't eaten and wants to eat though he ate a 3-4 hours prior. He reported he likes to drink smoothies for breakfast and eats salads other times for breakfast.    Unscrambling 3 word sentences presented orally: 50%. Cognitive Skills Activities: Activities/Procedures listed utilized to improve and/or restore cognitive function as related to thought organization. Required moderate verbal cueing to improve improve recall of information. __________________________________________________________________________________________________  History of Present Injury/Illness (Reason for Referral):    Treatment Assessment:   . Progression/Medical Necessity:   · Skilled intervention continues to be required due to decreased communication with family/caregivers and decreased cognitive skills. Compliance with Program/Exercises: Will assess as treatment progresses}. Reason for Continuation of Services/Other Comments:  · Patient continues to require skilled intervention due to decreased cognition, anomia.   Recommendations/Intent for next treatment session: \"Treatment next visit will focus on cognitive, speech tasks\".      Total Treatment Duration:  Time In: 1015  Time Out: Alice [address], MSP, CCC-SLP

## 2019-08-19 NOTE — PROGRESS NOTES
Fabiana Booth  : 1970  Primary: [employer]  Secondary:  [address]  at [facility]  [address], [address], [place], [facility]  [phone]   [phone]      OUTPATIENT OCCUPATIONAL THERAPY: Daily Treatment Note 2019  Visit Count:  11    ICD-10: Treatment Diagnosis:  Unspecified lack of coordination (R27.9); Dependence on wheelchair (Z99.3); History of falling (Z91.81)  Precautions/Allergies:   Patient has no allergy information on record. TREATMENT PLAN:  Effective Dates: 2019 TO 11/3/2019 (90 days). Frequency/Duration: 2 times a week for 90 Day(s)    Pre-treatment Symptoms/Complaints: Interpretor [facility] present. Patient states he feels like the power w/c would help him be less dependent on his wife. Pain: Initial: Pain Intensity 1: (did not rate)  Post Session:  same/10   Medications Last Reviewed:  2019   Updated Objective Findings:  None Today   TREATMENT:     Therapeutic Activity: (    15 minutes): Therapeutic activities including Bed transfers, Chair transfers and w/c transfers to improve mobility, strength, balance and coordination. Required moderate   to promote static and dynamic balance in sitting and promote coordination of bilateral, upper extremity(s), trunk. Patient sat edge of mat ~5 minutes. Patient assisted to and from the w/c to mat/mat to w/c twice each from tilt in space w/c to power w/c with assistance to manage leg rests and brakes. FUNCTIONAL MOBILITY:   Transfers:   Wheelchair to [employer]: Stand pivot transfer to the L with minimal assistance. Mat to wheelchair. Stand pivot transfer to the L with minimal assistance. Sit to Stand: Minimal assistance. Wheelchair Management and Training: ( 23 minutes): Procedure(s) utilized to improve and/or restore functioning as related to power wheelchair mobility and seating/positioning.  Required minimal visual and verbal cueing to facilitate ability to navigate trial power and/or manual wheelchair through multiple environments as would be expected in a home or community environment. Patient trialed Permobil M3 power w/c. He was able to operate power tilt and recline to his comfort level with minimal assistance. Equipment Trial (use \".outdoor\" to document outdoor training):   INDOOR TRAINING:  [x] YES [] NO Cause and effect concepts while in the wheelchair (i.e. Activating a switch causes the wheelchair to move)   [x] YES [] NO Stop and go concepts while in the wheelchair (i.e. \"stop and go\" verbal instructions, or consistently stopping for objects)   [x] YES [] NO Directional concepts while in the wheelchair (i.e. moving the joystick in different directions to move the wheelchair in different directions)   [x] YES [] NO Ability to follow directions: (i.e. Following varying verbal commands in a safe and timely manner)   [x] YES [] NO Adequate visual functioning to safely drive indoors (i.e. Visual attention to environment and ability to avoid obstacles)   [x] YES [] NO Adequate problem solving ability (i.e. Maneuver power wheelchair to designated destination without verbal cues)   [x] YES [] NO Ability to use access method with adequate activation, sustained contact and release (i.e. Able to access switch, control contact, and release when ready to stop wheelchair)   [] YES [] NO Ability to change drives and or speeds as appropriate for environment (i.e. Decreasing speed in high traffic areas)   [] YES [] NO Client ready to complete outdoor portion of power wheelchair mobility assessment   [x] YES [] NO Small space maneuverability (room)   [x] YES [] NO Large space maneuverability (hallway)   [x] YES [] NO Accessing Doorways   COMMENTS:  Patient initially struggled to drive w/c backward, but improved as he practiced - 3 times. He also did well with avoiding unexpected obstacles.      [employer] Portal  Treatment/Session Summary:  Patient trialed driving a power w/c demonstrating good problem solving skills making adjustments when needed making turns, driving backwards and going through doorways. Plan to continue trialing with advancement to challenges with power w/c mobility. Continue OT per plan of care. · Response to Treatment:  tolerated without complications. · Communication/Consultation:  None today  · Equipment provided today:  None today  · Recommendations/Intent for next treatment session: Next visit will focus on advancement to more challenging activities. .    Total Treatment Billable Duration:  38 minutes  OT Patient Time In/Time Out  Time In: 2026  Time Out: [address] [facility] 98, OTD, OTR/L    Future Appointments   Date Time Provider Sara Abdi   8/21/2019  9:30 AM Venkat Pagan DPT [facility] [place]   8/21/2019 10:15 AM JADA Pineda, OTR/L [facility]   8/21/2019 11:00 AM Demacro Das SLP [facility]   8/26/2019  8:45 AM Demarco Das SLP [facility]   8/26/2019  9:30 AM JADA Pineda, OTR/L [facility]   8/26/2019 10:15 AM Venkat Pagan DPT [facility]   8/27/2019 10:00 AM CARL XR RF ROOM 3 SFDRAD [facility]FD   8/28/2019  8:45 AM JADA Pineda, OTR/L [facility]   8/28/2019  9:30 AM Venkat Pagan DPT [facility]   8/28/2019 10:15 AM Kyung Vergara SLP [facility]

## 2019-08-21 ENCOUNTER — HOSPITAL ENCOUNTER (OUTPATIENT)
Dept: PHYSICAL THERAPY | Age: 49
Discharge: HOME | End: 2019-08-21

## 2019-08-21 PROCEDURE — 97530 THERAPEUTIC ACTIVITIES: CPT

## 2019-08-21 PROCEDURE — 97110 THERAPEUTIC EXERCISES: CPT

## 2019-08-21 PROCEDURE — 97542 WHEELCHAIR MNGMENT TRAINING: CPT

## 2019-08-21 PROCEDURE — 92507 TX SP LANG VOICE COMM INDIV: CPT

## 2019-08-21 NOTE — PROGRESS NOTES
Braeden Telles  : 1970  Primary: [employer]  Secondary:  [address] at [facility]  [address], [address], [place] [address] [facility]  [phone]   [phone]      OUTPATIENT OCCUPATIONAL THERAPY: Daily Treatment Note 2019  Visit Count:  11    ICD-10: Treatment Diagnosis:  Unspecified lack of coordination (R27.9); Dependence on wheelchair (Z99.3); History of falling (Z91.81)  Precautions/Allergies:   Patient has no allergy information on record. TREATMENT PLAN:  Effective Dates: 2019 TO 11/3/2019 (90 days). Frequency/Duration: 2 times a week for 90 Day(s)    Pre-treatment Symptoms/Complaints: Interpretor Rober Higgins present. Patient states he feels like the power w/c would help him be less dependent on his wife. Pain: Initial: Pain Intensity 1: (did not rate - lower back)  Post Session:  same/10   Medications Last Reviewed:  2019   Updated Objective Findings:  See progress note   TREATMENT:     Therapeutic Activity: (    7 minutes): Therapeutic activities including Bed transfers, Chair transfers and w/c transfers to improve mobility, strength, balance and coordination. Required moderate   to promote static and dynamic balance in sitting and promote coordination of bilateral, upper extremity(s), trunk. Patient sat edge of mat ~5 minutes. Patient assisted to and from the w/c to mat/mat to w/c twice each from tilt in space w/c to power w/c with assistance to manage leg rests and brakes. FUNCTIONAL MOBILITY:   Transfers:   Wheelchair to [employer]: Stand pivot transfer to the L with minimal assistance. Mat to wheelchair. Stand pivot transfer to the L with minimal assistance. Sit to Stand: Minimal assistance. Wheelchair Management and Training: ( 38 minutes): Procedure(s) utilized to improve and/or restore functioning as related to power wheelchair mobility and seating/positioning.  Required minimal visual and verbal cueing to facilitate ability to navigate trial power and/or manual wheelchair through multiple environments as would be expected in a home or community environment. Patient trialed Permobil M3 power w/c. He was able to operate power tilt and recline to his comfort level with minimal assistance. He was also able to lower the foot rests with standby assistance at first attempting to use his hands, but ultimately using a \"ski pole\" as it was difficult bending forward and somewhat painful in his lower back.   Equipment Trial (use \".outdoor\" to document outdoor training):   INDOOR TRAINING:  [x] YES [] NO Cause and effect concepts while in the wheelchair (i.e. Activating a switch causes the wheelchair to move)   [x] YES [] NO Stop and go concepts while in the wheelchair (i.e. \"stop and go\" verbal instructions, or consistently stopping for objects)   [x] YES [] NO Directional concepts while in the wheelchair (i.e. moving the joystick in different directions to move the wheelchair in different directions)   [x] YES [] NO Ability to follow directions: (i.e. Following varying verbal commands in a safe and timely manner)   [x] YES [] NO Adequate visual functioning to safely drive indoors (i.e. Visual attention to environment and ability to avoid obstacles)   [x] YES [] NO Adequate problem solving ability (i.e. Maneuver power wheelchair to designated destination without verbal cues)   [x] YES [] NO Ability to use access method with adequate activation, sustained contact and release (i.e. Able to access switch, control contact, and release when ready to stop wheelchair)   [x] YES [] NO Ability to change drives and or speeds as appropriate for environment (i.e. Decreasing speed in high traffic areas)   [x] YES [] NO Client ready to complete outdoor portion of power wheelchair mobility assessment   [x] YES [] NO Small space maneuverability (room)   [x] YES [] NO Large space maneuverability (hallway)   [x] YES [] NO Accessing Doorways   COMMENTS:  Patient initially struggled to drive w/c backward, but improved as he practiced - 3 times. He also did well driving over thresholds and adjusting and navigating the wheelchair around people. He drove up/down a fairly steep ramp and made adjustments to tilt as needed. Lastly, he opened automatic doors without cues/difficulty. [employer] Portal  Treatment/Session Summary:  Patient trialed driving a power w/c demonstrating good problem solving skills making adjustments when confronting obstacles, driving backwards, going through doorways, and up/down inclines. Plan is ready to trial outdoor power w/c mobility; however, plan to discuss options for funding for power w/c with  first.  Patient reports improved comfort in the power w/c and wife reports patient smiling at times in w/c (per interpretor). Continue OT per plan of care. · Response to Treatment:  tolerated without complications. · Communication/Consultation:  None today  · Equipment provided today:  None today  · Recommendations/Intent for next treatment session: Next visit will focus on advancement to more challenging activities. .    Total Treatment Billable Duration:  38 minutes  OT Patient Time In/Time Out  Time In: 1015  Time Out: Radha Cummings 36 Carey Cagle OTR/L    Future Appointments   Date Time Provider Sara Abdi   8/26/2019  8:45 AM German Das SLP [facility]   8/26/2019  9:30 AM Sylvie Lewis OTD, OTR/L [facility]   8/26/2019 10:15 AM Stephanie Campbell DPT [facility]   8/27/2019 10:00 AM SFD XR RF ROOM 3 SFDRAD [facility]   8/28/2019  8:45 AM JADA Rivero, OTR/L [facility]   8/28/2019  9:30 AM Stephanie Campbell DPT [facility]   8/28/2019 10:15 AM German Das SLP [facility]   9/4/2019  8:45 AM German Das SLP [facility]   9/4/2019  9:30 AM JADA Rivero, OTR/L [facility]   9/4/2019 10:15 AM Steffanie Rizzo PTA [facility]   9/6/2019  8:45 AM German Das, SLP [facility]   9/6/2019 9:30 AM Shonna Merchant PTA [facility]   9/6/2019 10:15 AM Florecita Johnston OTD, OTR/L [facility]   9/9/2019  8:45 AM Mandy Baum DPT [facility]   9/9/2019  9:30 AM JADA Arguello, OTR/L [facility]   9/9/2019 10:15 AM Rober Das SLP [facility]   9/11/2019  8:45 AM Rober Das, SLP [facility]   9/11/2019  9:30 AM Mandy Baum DPT [facility]   9/11/2019 10:15 AM JADA Arguello, OTR/L [facility]

## 2019-08-21 NOTE — PROGRESS NOTES
Karley Evans  : 1970  Primary: [employer]  Secondary:  [address]  at [facility]  [address], [address], [place], [facility]  [phone]   [phone]        OUTPATIENT SPEECH LANGUAGE PATHOLOGY: Progress Report    ICD-10: Treatment Diagnosis: cognitive communication deficits R 41.841  REFERRING PHYSICIAN: David Mccoy MD MD Orders: speech evaluate and treat  Return Physician Appointment:    PAST MEDICAL HISTORY: TBI, sleep apnea  MEDICAL/REFERRING DIAGNOSIS: TBI (traumatic brain injury) ([facility] [address] [S06.9X9A]  DATE OF ONSET:   PRIOR LEVEL OF FUNCTION: with spouse and children   PRECAUTIONS/ALLERGIES: NKDA   ASSESSMENT:  Pt has attended 4 sessions due to decreased cognition. He continues to exhibit significant memory deficits. However, he was able to recall how to tilt the new wheelchair he tried with OT earlier this date. He also stated all of his childrens' names this date without cues needed. He reported his children repeat things at home a lot to assist with his recall. For example, they repeat their names, times he takes medications, day of the week, etc.  His spouse said it is helpful if they repeat things and practice them daily. Patient will benefit from skilled intervention to address the above impairments. ?????? ? ? This section established at most recent assessment??????????  PROBLEM LIST (Impairments causing functional limitations):  1. Decreased cognition  2. Anomia   GOALS: (Goals have been discussed and agreed upon with patient.)  SHORT-TERM FUNCTIONAL GOALS: Time Frame: 3 months   1. Pt will name pictures with 80% accuracy. Goal ongoing. 2. Pt will complete word finding tasks with 80% accuracy. Goal partially met. 3. Pt will complete convergent/divergent naming tasks with 80% accuracy. Goal not met. 4. Pt will state his address with only min cues needed. Goal not met.   5. Pt will state his phone number with only min cues needed. Goal not met. 6. Pt will state his childrens' names with only min cues needed. Goal partially met. 7. Pt will complete immediate memory tasks with 80% accuracy. Goal not met. 8. Pt will complete STM tasks with 80% accuracy. Goal not met. 9. Pt will participate in a full cognitive assessment x1. Goal not targeted. DISCHARGE GOALS: Time Frame: 4-5 months   1. Increased memory and expressive language skills needed for highest level of independent functioning. REHABILITATION POTENTIAL FOR STATED GOALS: FairPLAN OF CARE:  INTERVENTIONS PLANNED: (Benefits and precautions of therapy have been discussed with the patient.)  1. Cognitive tasks  2. Speech tasks  TREATMENT PLAN EFFECTIVE DATES: 7/31/2019 TO 10/30/2019 (90 days). FREQUENCY/DURATION: Continue to follow patient 2 times a week for 90 days to address above goals. Regarding [address] [place] [address] Road therapy, I certify that the treatment plan above will be carried out by a therapist or under their direction. Thank you for this referral,  Carol Yoo  [address] [address] [facility]                     Referring Physician Signature: Anastasia Lutz MD     Date      SUBJECTIVE:  Pt cooperative. Spouse and  present. Present Symptoms: decreased cognition s/p TBI in 2016      Current Medications: see hard chart    Date Last Reviewed: 8/21/19  Social History/Home Situation: residing with spouse and their 5 children      Work/Activity History:  for [employer]     OBJECTIVE:  Objective Measure: Tool Used: National Outcomes Measurement System: Functional Communication Measures: SPOKEN LANGUAGE EXPRESSION  Score:  Initial: 5 Most Recent: 5 (Date: 8/21/19 )   Interpretation of Tool: This measure describes the change in functional communication status subsequent to speech-language pathology treatment of patients who have spoken language expression deficits.   o Level 1:  The individual attempts to speak, but verbalizations are not meaningful to familiar or unfamiliar communication partners at any time. o Level 2:  The individual attempts to speak, although few attempts are accurate or appropriate. The communication partner must assume responsibility for structuring the communication exchange, and with consistent and maximal cueing, the individual can only occasionally produce automatic and/or imitative words and phrases that are rarely meaningful in context.  o Level 3:  The communication partner must assume responsibility for structuring the communication exchange, and with consistent and moderate cueing, the individual can produce words and phrases that are appropriate and meaningful in context.  o Level 4:  The individual is successfully able to initiate communication using spoken language in simple, structured conversations in routine daily activities with familiar communication partners. The individual usually requires moderate cueing, but is able to demonstrate use of simple sentences (i.e., semantics, syntax, and morphology) and rarely uses complex sentences/messages. o Level 5:  The individual is successfully able to initiate communication using spoken language in structured conversations with both familiar and unfamiliar communication partners. The individual occasionally requires minimal cueing to frame more complex sentences in messages. The individual occasionally self-cues when encountering difficulty. o Level 6:  The individual is successfully able to communicate in most activities, but some limitations in spoken language are still apparent in vocational, avocational, and social activities. The individual rarely requires minimal cueing to frame complex sentences. The individual usually self-cues when encountering difficulty. o Level 7: The individuals ability to successfully and independently participate in vocational, avocational, and social activities is not limited by spoken language skills.  Independent functioning may occasionally include use of self-cueing. Score Level 7 Level 6 Level 5 Level 4 Level 3 Level 2 Level 1   Modifier CH CI CJ CK CL CM CN     Mental Status:  Alert    Neuro-Linguistics:  Stating his address: stated [address] ([address]) [address] [place]. Didn't recall the zip code. Stated 686 after SLP stated 29. Stating children's names: stated 5/5 names. Childrens' names are: Dolores Alvarado and Pieter. He reported they reminded him of their names. Orientation: stated Wednesday. Reported he could not recall August.  However, he was able to state August with cues. He reported his children go over the day with him and they told him this morning. Recalling 2 activities from PT this date. Recalled what he had for breakfast (smoothie). General problem solvin%. Creating 2 sentences given 2 words: 1/3. Creating 1 sentence: 100%. Cognitive Skills Activities: Activities/Procedures listed utilized to improve and/or restore cognitive function as related to thought organization. Required moderate verbal cueing to improve improve recall of information. __________________________________________________________________________________________________  History of Present Injury/Illness (Reason for Referral):    Treatment Assessment:   . Progression/Medical Necessity:   · Skilled intervention continues to be required due to decreased communication with family/caregivers and decreased cognitive skills. Compliance with Program/Exercises: Will assess as treatment progresses}. Reason for Continuation of Services/Other Comments:  · Patient continues to require skilled intervention due to decreased cognition, anomia. Recommendations/Intent for next treatment session: \"Treatment next visit will focus on cognitive, speech tasks\".      Total Treatment Duration:  Time In: 1100  Time Out: Jose Juan Khan, MSP, CCC-SLP

## 2019-08-21 NOTE — PROGRESS NOTES
Franki Ward  : 1970  Primary: [employer]  Secondary:  [address]  at [facility]  [address], [address], [place], [facility]  [phone]   [phone]        OUTPATIENT PHYSICAL THERAPY: Daily Treatment Note 2019  Visit Count:  10    ICD-10: Treatment Diagnosis: Unsteadiness on feet (R26.81)  Other lack of coordination (R27.8)  Repeated falls (R29.6)    Pre-treatment Symptoms/Complaints:  Patient presents with wife and . reports he took advil prior to session today. Pain: Initial:   no number given, but complains of neck pain intermittently. Post Session:  Some better, but still no number. Medications Last Reviewed:  2019  Updated Objective Findings:  See evaluation note from today  TREATMENT:     THERAPEUTIC ACTIVITY: ( 30 minutes): Therapeutic activities per grid below to improve mobility, strength and coordination. Date:  19 Date:  19 Date:  19 Date:  19   Activity/Exercise Parameters Parameters  Parameters    Rhythmic facilitation to decrease tone  10 min -       Transfers - to and from mat  X 2 mod A of 1 Min A x 1 person     Bed mobility  Supine to sit to supine   X 5  Min A to supervision      Sit to stand  Min A at walker and bars Min A - throughout session to ll bars and walker  Min A x 5 reps in bars  Min A to mod A with rolling walker   Static standing  2 x 30 sec at walker,  X 30 sec at bars 4 x 30 sec  4 x 45 seconds in parallel bars   Standing marching  3 x 10 reps in parallel bars X 10 B in ll bars     Ambulation   2 x 10 feel in bars  3 x 50' with rolling walker, 2 person assist +wheelchair follow for safety 4 x 10 feet in ll bars with assist of 1    Then with rolling walker   2 x 40 feet with assist of 2 and wheelchair follow 3 x 40' with rolling walker, 2 person assist with wheelchair follow.   Min A to mod A   Standing in ll bars - tapping foot forward and back    2 x 5 B and assist of 1    Standing in ll bars - tapping foot to side and back    X 5 B then 2 more reps each side  With assist of 1 and 1 sitting rest break    Standing weight shifts    X 10 reps to the R in bars       THERAPEUTIC EXERCISE: ( 10 minutes):  Exercises per grid below to improve mobility, strength and coordination. Date:  8-5-19 Date:  8-12-19 Date:  8/14/19 Date:  8/21/19   Activity/Exercise Parameters Parameters Parameters Parameters   Single knee to chest  3 x 20 sec B with therapist assist  Attempted but too much tone fighting    Hamstring stretch  3 x 20  With therapist assist   Attempted but patient unable to relax X 60 sec hold B seated in chair   Lower trunk rotation  5 x 10 sec B with therapist assist  With rocking and compression  Through small ROM- increased pain    Long arc Quads X 10 B  X 10 B with slight compression at shoulders X 2 reps B through small ROM X 10 reps L  Attempted R but unable    Side lying - hip abduction   2 x 5 B      Side lying - R hip flexor stretch   3 x 15 sec hold with slight stretch      Seated at edge of mat   Raising arms - alternating sides with slight compression at shoulders     Sit to stand   X 5      HS curls    X 10 reps L     Ankle pumps    X 10 reps L      [employer] Portal  Treatment/Session Summary:  Patient continues to do well and is motivated during session. See progress note from today. · Response to Treatment:  no adverse reactions. · Communication/Consultation:  None today   · Equipment provided today:  None today  · Recommendations/Intent for next treatment session: Next visit will focus on improving functional mobility and balance.       Total Treatment Billable Duration:  40 minutes   PT Patient Time In/Time Out  Time In: 0930  Time Out: [facility], [facility]    Future Appointments   Date Time Provider Sara Abdi   8/26/2019  8:45 AM Omayra Das, SLP [facility]   8/26/2019  9:30 AM JADA Justin, OTR/L [facility]   8/26/2019 10:15 AM Abelino Arndt, DPT [facility]   8/27/2019 10:00 AM SFD XR RF ROOM 3 [facility]   8/28/2019  8:45 AM Kely Lopez OTD, OTR/L [facility]   8/28/2019  9:30 AM Abelino Arndt, DPT [facility]   8/28/2019 10:15 AM Ty Das, SLP [facility]   9/4/2019  8:45 AM Ty Das, SLP [facility]   9/4/2019  9:30 AM JADA Ernst, OTR/L [facility]   9/4/2019 10:15 AM Eugenia Velazquez, PTA [facility]   9/6/2019  8:45 AM Ty Das, SLP [facility]   9/6/2019  9:30 AM Ritu Mccain, PTA [facility]   9/6/2019 10:15 AM JADA Ernst, OTR/L [facility]   9/9/2019  8:45 AM Radha Shukla, DPT [facility]   9/9/2019  9:30 AM JADA Ernst, OTR/L [facility]   9/9/2019 10:15 AM Ty Das, SLP [facility]   9/11/2019  8:45 AM Ty Das, SLP [facility]   9/11/2019  9:30 AM Abelino Arndt, DPT [facility]   9/11/2019 10:15 AM JADA Ernst, OTR/L [facility]

## 2019-08-21 NOTE — PROGRESS NOTES
Aleksandar Dorseyferny  : 1970  Primary: [employer]  Secondary:  [address]  at [facility]  [address], [address], [place], [facility]  [phone]   [phone]         OUTPATIENT PHYSICAL THERAPY:Progress Report 2019    ICD-10: Treatment Diagnosis: Unsteadiness on feet (R26.81)  Other lack of coordination (R27.8)  Repeated falls (R29.6)  Precautions/Allergies:   Patient has no allergy information on record. none per wife report  TREATMENT PLAN:  Effective Dates: 2019 TO 10/29/2019 (90 days). Frequency/Duration: 2 times a week for 90 Day(s) MEDICAL/REFERRING DIAGNOSIS:  TBI (traumatic brain injury) [facility]) [S06.9X9A]   DATE OF ONSET: 2016  REFERRING PHYSICIAN:  Avie Hodgkin MD Orders: evaluate and treat   RETURN PHYSICIAN APPOINTMENT: unknown      ASSESSMENT (Date: 19):  Mr. Nikky Farrar has been seen in physical therapy for 6 visits since 19. He has been compliant with his therapy visits. He has currently met 1/3 of his short term goals and none of his long term goals. Patient puts forth good effort in therapy but progress is limited due to his significant tremoring. He still requires a lot of assistance for mobility due to an increased risk of falling. He is also limited by back and neck pain. He continues to benefit from skilled PT to improve his functional mobility. PROBLEM LIST (Impacting functional limitations):  1. Decreased Strength  2. Decreased ADL/Functional Activities  3. Decreased Transfer Abilities  4. Decreased Ambulation Ability/Technique  5. Decreased Balance  6. Decreased Activity Tolerance  7. Decreased Falls with Home Exercise Program INTERVENTIONS PLANNED:  1. Balance Exercise  2. Bed Mobility  3. Family Education  4. Gait Training  5. Home Exercise Program (HEP)  6. Neuromuscular Re-education/Strengthening  7. Therapeutic Activites  8. Therapeutic Exercise/Strengthening  9.  Transfer Training     GOALS: (Goals have been discussed and agreed upon with patient.)  Short-Term Functional Goals: Time Frame: 45 days  1. Patient will be compliant with HEP. MET  2. Patient will have an increase on the AMPAC to 14/24 in order to improve functional mobility. NOT MET  3. Patient will demonstrate a stand pivot transfer with supervision in order to improve home mobility. NOT MET  Discharge Goals: Time Frame: 90 days  1. Patient will be independent with HEP. NOT MET  2. Patient will have an increase on the AMPAC to 18/24 in order to improve functional mobility. NOT MET  3. Patient will demonstrate ambulating 48' with the LRAD and supervision in order to improve ability to complete ADLs. NOT MET  4. Patient will demonstrate a car transfer with supervision in order to decrease assistance needed from wife. NOT MET  5. Patient will perform sit to supine with modified independence in order to improve bed mobility. NOT MET    Outcome Measure:             [facility] AM-PAC 6 Clicks         Basic Mobility Inpatient Short Form  How much difficulty does the patient currently have. .. Unable A Lot A Little None   1. Turning over in bed (including adjusting bedclothes, sheets and blankets)? [x] 1   [] 2   [] 3   [] 4   2. Sitting down on and standing up from a chair with arms ( e.g., wheelchair, bedside commode, etc.)   [] 1   [x] 2   [] 3   [] 4   3. Moving from lying on back to sitting on the side of the bed? [] 1   [x] 2   [] 3   [] 4          How much help from another person does the patient currently need. .. Total A Lot A Little None   4. Moving to and from a bed to a chair (including a wheelchair)? [] 1   [x] 2   [] 3   [] 4   5. Need to walk in hospital room? [] 1   [x] 2   [] 3   [] 4   6. Climbing 3-5 steps with a railing? [] 1   [x] 2   [] 3   [] 4   © 2007, Trustees of [facility], under license to [employer].  All rights reserved     Score:  Initial: 11 Most Recent: X (Date: -- )   Interpretation of Tool:  Represents activities that are increasingly more difficult (i.e. Bed mobility, Transfers, Gait). UPDATED OBJECTIVE FINDINGS:       Mental Status:          Confused and Lethargic  Palpation:          increased tone R UE and LE.  possible cogwheel t  Coordination:       impaired LUE, impaired LLE, impaired RUE and impaired RLE  Balance:          decreased static balance, decreased dynamic balance and poor sitting and standing balance    Lower Extremity: difficult to distinguish lack of strength vs lack of effort vs lack of understanding    Strength PROM   Action R L R  L    Hip Flexion 3- 3     Hip Extension NT NT     Hip Abduction unable NT     Hip Adduction NT NT     Knee Flexion unable 3     Knee Extension 3- 3     Dorsi Flexion unable 3-     Plantar Flexion       Inversion       Eversion                 Functional Mobility:         Gait/Ambulation:  Ambulates with rolling walker, forward flexed trunk, full body tremors, decreased step length, decreased gait speed, 5' to bedside chair with 2 person assist for safety. Transfers: Mod A x 2 persons, stand pivot transfer with rolling walker. Very unsteady        Bed Mobility:  Max A, full body tremors throughout         Stairs:  NT        Wheelchair:  dependent     Ambulatory/Rehab Services H2 Model Falls Risk Assessment    Risk Factors:       (4)  Confusion/Disorientation/Impulsivity       (2)  Symptomatic Depression       (1)  Gender [Male]       (2)  Any administered antiepileptics/anticonvulsants       (5)  History of Recent Falls [w/in 3 months] Ability to Rise from Chair:       (4)  Unable to rise without assistance    Falls Prevention Plan:       Physical Limitations to Exercise (specify):  using a wheelchair       Mobility Assistance Device (specify):  wheelchair and walker   Total: (5 or greater = High Risk): 18    ©2010 [facility] of [facility] Patent #[id number].  Federal Law prohibits the replication, distribution or use without written permission from [facility] of [employer] Necessity:   · Patient is expected to demonstrate progress in strength, range of motion, balance and functional technique to increase independence with ADLs. · Patient demonstrates fair rehab potential due to higher previous functional level. Reason for Services/Other Comments:  · Patient continues to require modification of therapeutic interventions to increase complexity of exercises. Total Duration:  PT Patient Time In/Time Out  Time In: 0930  Time Out: 1015        Rehabilitation Potential For Stated Goals: [address] therapy, I certify that the treatment plan above will be carried out by a therapist or under their direction.   Thank you for this referral,  Amber Clarke DPT    Referring Physician Signature: Dr. Riki Sylvester

## 2019-08-21 NOTE — PROGRESS NOTES
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 Activity Tolerance  5. Sensation  6. Fine Motor Control  7. Gross Motor Control  8. Vision  9. Pain (acute)  10. Pain (Chronic) Cognitive Skills Affected (resulting in the inability to perform in a timely and safe manner):  1. Perception  2. Executive Function  3. Divided Attention Psychosocial Skills Affected:  1. Habits/Routines  2. Environmental Adaptation  3. Social Interaction  4. Emotional Regulation  5. Social Roles   Number of elements that affect the Plan of Care[de-identified] 5+:  HIGH COMPLEXITY   CLINICAL DECISION MAKING:   Clinical Decision-Making Assessment:  Víctor Caper required moderate to maximal verbal, visual, physical or environmental cues to carry out assessment tasks.    Assessment process, impact of co-morbidities, assessment modification\need for assistance, and selection of interventions: Analytical Complexity:HIGH COMPLEXITY

## 2019-08-22 NOTE — PROGRESS NOTES
OT Note (8/22/19): This OT spoke with Mary Bruno RN case manager for Mr. Em Combs regarding getting a power w/c covered through [facility]. Mary Bruno referred this therapist to call Genesis Avila RN with [employer] and this therapist spoke with Sulaiman Yarbrough regarding how Mr. Em Combs would benefit from a power [facility]/c. [facility] our [facility]. [place] Samantha Hannon) to call Sulaiman Yarbrough to get her fax number to send most recent OT progress note.

## 2019-08-26 ENCOUNTER — HOSPITAL ENCOUNTER (OUTPATIENT)
Dept: PHYSICAL THERAPY | Age: 49
Discharge: HOME | End: 2019-08-26

## 2019-08-26 PROCEDURE — 97110 THERAPEUTIC EXERCISES: CPT

## 2019-08-26 PROCEDURE — 97542 WHEELCHAIR MNGMENT TRAINING: CPT

## 2019-08-26 PROCEDURE — 92507 TX SP LANG VOICE COMM INDIV: CPT

## 2019-08-26 PROCEDURE — 97535 SELF CARE MNGMENT TRAINING: CPT

## 2019-08-26 PROCEDURE — 97530 THERAPEUTIC ACTIVITIES: CPT
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
## 2019-08-26 NOTE — PROGRESS NOTES
Finn Villar  : 1970  Primary: [employer] [facility] [employer]  Secondary:  [address]  at [facility]  [address], [address], [place], [facility] [address] [facility]  [phone]   [phone]        OUTPATIENT PHYSICAL THERAPY: Daily Treatment Note 2019    ICD-10: Treatment Diagnosis: Unsteadiness on feet (R26.81)  Other lack of coordination (R27.8)  Repeated falls (R29.6)    Pre-treatment Symptoms/Complaints:  Patient reports neck pain 7-8/10  Pain: Initial:   7-8/10. Post Session:  Some better, but no number rating   Medications Last Reviewed:  2019  Updated Objective Findings:  None Today  TREATMENT:     THERAPEUTIC ACTIVITY: ( 30 minutes): Therapeutic activities per grid below to improve mobility, strength and coordination. Date:  19 Date:  19 Date:  19 Date:  19   Activity/Exercise Parameters  Parameters  Parameters    Rhythmic facilitation to decrease tone        Transfers - to and from mat  Min A x 1 person      Bed mobility  Min A to supervision       Sit to stand Min A at walker and bars Min A - throughout session to ll bars and walker  Min A x 5 reps in bars  Min A to mod A with rolling walker CGA in bars and with rolling walker   Static standing 2 x 30 sec at walker,  X 30 sec at bars 4 x 30 sec  4 x 45 seconds in parallel bars 2 x 60' in bars   Standing marching 3 x 10 reps in parallel bars X 10 B in ll bars   2 x 20 reps in bars   Ambulation  2 x 10 feel in bars  3 x 50' with rolling walker, 2 person assist +wheelchair follow for safety 4 x 10 feet in ll bars with assist of 1    Then with rolling walker   2 x 40 feet with assist of 2 and wheelchair follow 3 x 40' with rolling walker, 2 person assist with wheelchair follow.   Min A to mod A 2 x 60' with weighted rolling walker and 2 person assist min A to CGA- better stability today   Standing in ll bars - tapping foot forward and back   2 x 5 B and assist of 1     Standing in ll bars - tapping foot to side and back   X 5 B then 2 more reps each side  With assist of 1 and 1 sitting rest break     Standing weight shifts   X 10 reps to the R in bars        THERAPEUTIC EXERCISE: ( 10 minutes):  Exercises per grid below to improve mobility, strength and coordination. Date:  8-12-19 Date:  8/14/19 Date:  8/21/19 Date:  8/26/19   Activity/Exercise Parameters Parameters Parameters Parameters   Single knee to chest   Attempted but too much tone fighting     Hamstring stretch   Attempted but patient unable to relax X 60 sec hold B seated in chair    Lower trunk rotation  With rocking and compression  Through small ROM- increased pain     Long arc Quads X 10 B with slight compression at shoulders X 2 reps B through small ROM X 10 reps L  Attempted R but unable     Side lying - hip abduction  2 x 5 B       Side lying - R hip flexor stretch  3 x 15 sec hold with slight stretch       Seated at edge of mat  Raising arms - alternating sides with slight compression at shoulders      Sit to stand  X 5       HS curls   X 10 reps L      Ankle pumps   X 10 reps L    Manual stretch to neck flexors    3 x 30 sec- gentle due to increased pain   Cervical chin tuck isometrics    X 8 reps with 3 sec hold   Cervical ROM    Through very small ROM due to pain      [employer] Portal  Treatment/Session Summary:  Patient with better balance and stability during gait today with possibly slightly less tremoring. Attempted joint approximation through the cervical spine but very painful. He continues to benefit from skilled PT to improve functional mobility. · Response to Treatment:  no adverse reactions. · Communication/Consultation:  None today   · Equipment provided today:  None today  · Recommendations/Intent for next treatment session: Next visit will focus on improving functional mobility and balance.       Total Treatment Billable Duration:  40 minutes   PT Patient Time In/Time Out  Time In: 1015  Time Out: [address] [facility] Valorie Abel DPT    Future Appointments   Date Time Provider Sara Abdi   8/27/2019 10:00 AM SFD XR RF ROOM 3 SFDRAD SFD   8/28/2019  8:45 AM JADA Arnold, OTR/L SFDORPT SFD   8/28/2019  9:30 AM [place] Cadet, [facility] SFD   8/28/2019 10:15 AM Opal Das, SLP S[facility]PT SFD   9/4/2019  8:45 AM Opal Das, SLP S[facility]PT SFD   9/4/2019  9:30 AM JADA Arnold, OTR/L [facility]PT SFD   9/4/2019 10:15 AM Mary Velazquez PTA SFDORPT SFD   9/6/2019  8:45 AM Opal Das, SLP S[facility]ORPT SFD   9/6/2019  9:30 AM Suhail Chiang, [facility] SFD   9/6/2019 10:15 AM JADA Arnold, OTR/L [facility]PT SFD   9/9/2019  8:45 AM Puneet Franks DPT SFDORPT SFD   9/9/2019  9:30 AM JADA Arnold, OTR/L [facility] SFD   9/9/2019 10:15 AM Opal Das, SLP SFDORPT SFD   9/11/2019  8:45 AM Opal Das, SLP SFDORPT SFD   9/11/2019  9:30 AM Roula Cobb DPT [facility] SFD   9/11/2019 10:15 AM JADA Arnold, OTR/L [facility]

## 2019-08-26 NOTE — PROGRESS NOTES
Ronn Alexander  : 1970  Primary: [employer] [facility] [employer]  Secondary:  [address] at [address] [facility] [address], [address], [place], [address] [facility]  [phone]   [phone]      OUTPATIENT OCCUPATIONAL THERAPY: Daily Treatment Note 2019  Visit Count:  13    ICD-10: Treatment Diagnosis:  Unspecified lack of coordination (R27.9); Dependence on wheelchair (Z99.3); History of falling (Z91.81)  Precautions/Allergies:   Patient has no allergy information on record. TREATMENT PLAN:  Effective Dates: 2019 TO 11/3/2019 (90 days). Frequency/Duration: 2 times a week for 90 Day(s)    Pre-treatment Symptoms/Complaints: Interpretor Tin Webb present. Patient's wife states patient's  encouraged her to write down notes about the w/c (patient informed that his  was contacted about w/c). Patient states when he makes efforts he tends to shake a lot and the back of his head tends to swell and hurt. Pain: Initial: Pain Intensity 1: 7  Post Session:  same/10   Medications Last Reviewed:  2019   Updated Objective Findings:  Light touch on occiput is painful to touch. TREATMENT:     Therapeutic Activity: (    7 minutes): Therapeutic activities including Bed transfers, Chair transfers and w/c transfers to improve mobility, strength, balance and coordination. Required moderate   to promote static and dynamic balance in sitting and promote coordination of bilateral, upper extremity(s), trunk. Patient sat edge of mat ~5 minutes. Patient assisted to and from the w/c to mat/mat to w/c twice each from tilt in space w/c to power w/c with assistance to manage leg rests and brakes. FUNCTIONAL MOBILITY:   Transfers:   Wheelchair to [employer]: Stand pivot transfer to the R with minimal assistance. Mat to wheelchair. Stand pivot transfer to the L with minimal assistance. Sit to Stand: Minimal assistance.   Self Care: (23 minutes): Procedure(s) (per grid) utilized to improve and/or restore self-care/home management as related to self feeding. Required moderate visual, verbal, manual and tactile cueing to facilitate activities of daily living skills and compensatory activities. Patient demonstrated significant \"shaking\" of his R UE and had difficulty using his R UE for self-feeding. Attempted to use L UE and found that if R UE was supported (pillow under axilla between side and R UE and towels under L wrist) he had more success. He was able to retrieve simulated food (theraputty) in this position and also bring to mouth. ADLs from ADL Navigator: Activities of Daily Living       Feeding  Feeding Assistance: Moderate assistance  Container Management: Maximum assistance  Utensil Management: Maximum assistance  Food to Mouth: Moderate assistance  Cues: Verbal cues provided, Tactile cues provided, Visual cues provided  Adaptive Equipment: Built up fork, Other (comment)(plate with rim; dycem under plate)                 Wheelchair Management and Training: ( 15 minutes): Procedure(s) utilized to improve and/or restore functioning as related to power wheelchair mobility and seating/positioning. Required minimal visual and verbal cueing to facilitate ability to navigate trial power and/or manual wheelchair through multiple environments as would be expected in a home or community environment. Patient's leg rests were shortened ~2-3\" to prevent excessive shaking through his R LE/BLE. Patient reported it was more comfortable after adjusted. [employer] Portal  Treatment/Session Summary:  Patient felt more comfortable with adjustments made to leg rests of tilt in space w/c. He demonstrated improved ability for self-feeding with the above modifications/adaptations made. .  Continue OT per plan of care. · Response to Treatment:  tolerated without complications.   · Communication/Consultation:  None today  · Equipment provided today:  None today  · Recommendations/Intent for next treatment session: Next visit will focus on advancement to more challenging activities. .    Total Treatment Billable Duration:  38 minutes  OT Patient Time In/Time Out  Time In: 0930  Time Out: 1015  Shima Britt OTR/L    Future Appointments   Date Time Provider Sara Trinidad   8/27/2019 10:00 AM SFD XR RF ROOM 3 SFDRAD SFD   8/28/2019  8:45 AM Sylvie Lewis, OTD, OTR/L SFDORPT SFD   8/28/2019  9:30 AM Delaney Pulido, PTA [facility] SFD   8/28/2019 10:15 AM German Das, SLP SFDORPT SFD   9/4/2019  8:45 AM German Das, SLP SFDORPT SFD   9/4/2019  9:30 AM Sylvie Lewis OTD, OTR/L SFDORPT D   9/4/2019 10:15 AM Steffanie Velazquez, PTA SFDORPT SFD   9/6/2019  8:45 AM German Das, SLP SFDORPT D   9/6/2019  9:30 AM Delaney Pulido, PTA SFDORPT SFD   9/6/2019 10:15 AM Sylvie Lewis, OTD, OTR/L [facility]PT SFD   9/9/2019  8:45 AM Shirley Babin, DPT SFDORPT SFD   9/9/2019  9:30 AM Sylvie Lewis OTD, OTR/L [facility] SFD   9/9/2019 10:15 AM German Das, SLP SFDORPT SFD   9/11/2019  8:45 AM German Das, SLP SFDORPT SFD   9/11/2019  9:30 AM Stephanie Campbell, DPT SFDORPT SFD   9/11/2019 10:15 AM Sylvie Lewis, OTD, OTR/L [facility]

## 2019-08-27 ENCOUNTER — HOSPITAL ENCOUNTER (OUTPATIENT)
Dept: GENERAL RADIOLOGY | Age: 49
Discharge: HOME | End: 2019-08-27
Payer: OTHER MISCELLANEOUS

## 2019-08-27 VITALS — WEIGHT: 215 LBS | HEIGHT: 68 IN | BODY MASS INDEX: 32.58 KG/M2

## 2019-08-27 DIAGNOSIS — R13.12 OROPHARYNGEAL DYSPHAGIA: ICD-10-CM

## 2019-08-27 DIAGNOSIS — R13.10 DYSPHAGIA: ICD-10-CM

## 2019-08-27 PROCEDURE — 74230 X-RAY XM SWLNG FUNCJ C+: CPT

## 2019-08-27 PROCEDURE — 74011000255 HC RX REV CODE- 255

## 2019-08-27 PROCEDURE — 92611 MOTION FLUOROSCOPY/SWALLOW: CPT

## 2019-08-27 RX ADMIN — BARIUM SULFATE 15 ML: 400 PASTE ORAL at 10:36

## 2019-08-27 RX ADMIN — BARIUM SULFATE 45 ML: 980 POWDER, FOR SUSPENSION ORAL at 10:35

## 2019-08-27 NOTE — THERAPY EVALUATION
Elizabeth Montiel  : 1970  Primary: [employer]  Secondary:  Therapy Center at [facility] [address], [facility], [address]  [phone]   [phone]       OUTPATIENT SPEECH LANGUAGE PATHOLOGY: MODIFIED BARIUM SWALLOW    ICD-10: Treatment Diagnosis: Oropharyngeal dysphagia (R13.12)  DATE: 2019  REFERRING PHYSICIAN: Rayray Kilpatrick MD MD Orders: Modifed Barium Swallow  PAST MEDICAL HISTORY:   Mr. Wojciech Pena is a 51 y.o. male who  has no past medical history on file. He also  has no past surgical history on file. RADIOLOGIST:  Dr. Dominique Arechiga  MEDICAL/REFERRING DIAGNOSIS: Dysphagia [R13.10]    PRECAUTIONS/ALLERGIES: Patient has no allergy information on record. ASSESSMENT/PLAN OF CARE:Based on the objective data described below, the patient presents with delayed swallow initiation with all consistencies and increased time for pharyngeal phase of swallow (much like performing [employer]). No laryngeal penetration or aspiration observed with any consistency and no pharyngeal residue after swallow. Decreased relaxation of upper esophageal segment observed that did not affect flow during this assessment, but should be further assessed as a potential cause for choking episodes. Patient instructed in positioning strategies during PO intake for potential reflux.   RECOMMENDATIONS AND PLANNED INTERVENTIONS (Benefits and precautions of therapy have been discussed with the patient.):  · continue prescribed diet  MEDICATIONS:  · Whole or crushed as tolerated  COMPENSATORY STRATEGIES/MODIFICATIONS INCLUDING:  · Fully awake/alert  · Upright for all PO  · 1:1 assistance with all PO  · Alternate liquids/solids  · Small bites and sips  · Remain upright for 20-30 min after any PO  OTHER RECOMMENDATIONS (including follow up treatment recommendations):   · GI consult for further assessment of esophageal phase of swallow    Thank you for this referral,  Stephanie Beard MA, CCC-SLP  SUBJECTIVE:Patient pleasant and cooperative. He is accompanied by his wife and . Present Symptoms: cough when eating or drinking      Current Dietary Status:  Soft solids, thin liquids    OBJECTIVE:Objective Measure: Tool Used: National Outcomes Measurement System: Functional Communication Measures: SWALLOWING  Score:  Initial: 5 Most Recent: X (Date: -- )    Interpretation of Tool: This measure describes the change in functional communication status subsequent to speech-language pathology treatment of patients with dysphagia.  Level 1:  Individual is not able to swallow anything safely by mouth. All nutrition and hydration is received through non-oral means (e.g., nasogastric tube, PEG).  Level 2: Individual is not able to swallow safely by mouth for nutrition and hydration, but may take some consistency with consistent maximal cues in therapy only. Alternative method of feeding required.  Level 3:  Alternative method of feeding required as individual takes less than 50% of nutrition and hydration by mouth, and/or swallowing is safe with consistent use of moderate cues to use compensatory strategies and/or requires maximum diet restriction.  Level 4:  Swallowing is safe, but usually requires moderate cues to use compensatory strategies, and/or the individual has moderate diet restrictions and/or still requires tube feeding and/or oral supplements.  Level 5:  Swallowing is safe with minimal diet restriction and/or occasionally requires minimal cueing to use compensatory strategies. The individual may occasionally self-cue. All nutrition and hydration needs are met by mouth at mealtime.  Level 6:  Swallowing is safe, and the individual eats and drinks independently and may rarely require minimal cueing. The individual usually self-cues when difficulty occurs.  May need to avoid specific food items (e.g., popcorn and nuts), or require additional time (due to dysphagia).  Level 7: The individuals ability to eat independently is not limited by swallow function. Swallowing would be safe and efficient for all consistencies. Compensatory strategies are effectively used when needed. Cognitive/Communication Status:  Mental Status  Neurologic State: Alert, Eyes open to voice  Orientation Level: Oriented to person  Cognition: Follows commands, Decreased attention/concentration  Perception: Appears intact  Perseveration: No perseveration noted  Safety/Judgement: Awareness of environment    Oral Assessment:  Oral Assessment  Labial: Decreased rate  Dentition: Natural  Lingual: Decreased rate    Vocal Quality: adequate    Patient Viewed:    Film Views: Lateral, Fluoro    Oral Prepatory:  The patient was given the following: Consistency Presented:  Thin liquid, Solid, Pudding, Mixed consistency  How Presented: SLP-fed/presented, Cup/sip, Spoon, Straw    Oral Phase:  Bolus Acceptance: No impairment  Bolus Formation/Control: No impairment  Propulsion: Delayed (# of seconds)     Oral Residue: None  Initiation of Swallow: No impairment  Oral Phase Severity: Minimal    Pharyngeal Phase:  Timing: No impairment     Laryngeal Elevation: WFL (within functional limits)  Penetration: None  Aspiration/Timing: No evidence of aspiration  Aspiration/Penetration Score: 1 (No penetration or aspiration-Contrast does not enter the airway)     Pharyngeal Dysfunction: None  Pharyngeal Phase Severity: N/A  Pharyngeal-Esophageal Segment: Suspected esophageal dysphagia, Decreased relaxation of upper esophageal segment    Assessment/Reassessment only, no treatment provided today    Recommendations for treatment: GI consult  Total Treatment Duration:  Time In: 1015   Time Out: 76812 Sr Lianne MA, CCC-SLP

## 2019-08-28 ENCOUNTER — HOSPITAL ENCOUNTER (OUTPATIENT)
Dept: PHYSICAL THERAPY | Age: 49
Discharge: HOME | End: 2019-08-28

## 2019-08-28 PROCEDURE — 92507 TX SP LANG VOICE COMM INDIV: CPT

## 2019-08-28 PROCEDURE — 97535 SELF CARE MNGMENT TRAINING: CPT

## 2019-08-28 PROCEDURE — 97110 THERAPEUTIC EXERCISES: CPT

## 2019-08-28 PROCEDURE — 97530 THERAPEUTIC ACTIVITIES: CPT

## 2019-08-28 NOTE — PROGRESS NOTES
Finn Villar  : 1970  Primary: [employer]  Secondary:  [address] at [facility]  [address], [address], [place], [facility]  [phone]   [phone]      OUTPATIENT OCCUPATIONAL THERAPY: Daily Treatment Note 2019  Visit Count:  15    ICD-10: Treatment Diagnosis:  Unspecified lack of coordination (R27.9); Dependence on wheelchair (Z99.3); History of falling (Z91.81)  Precautions/Allergies:   Patient has no allergy information on record. TREATMENT PLAN:  Effective Dates: 2019 TO 11/3/2019 (90 days). Frequency/Duration: 2 times a week for 90 Day(s)    Pre-treatment Symptoms/Complaints: Interpretor Maryann Busby present for about half of session - interpreted over the phone for part of session. Patient's wife states he has about up to 3 hour periods where he can't feel his hands or feet or use them. Patient states he develops swelling in parts of his extremities that his wife rubs which helps. Patient states the adjustments made to leg rests of tilt in space w/c were fine. Pain: Initial: Pain Intensity 1: (Pain - did not rate)  Post Session:  same/10   Medications Last Reviewed:  2019   Updated Objective Findings:  None Today   TREATMENT:     Therapeutic Activity: (    4 minutes): Therapeutic activities including Bed transfers, Chair transfers and w/c transfers to improve mobility, strength, balance and coordination. Required moderate   to promote static and dynamic balance in sitting and promote coordination of bilateral, upper extremity(s), trunk. Patient sat edge of mat ~5 minutes. Patient assisted to and from the w/c to mat/mat to w/c  from tilt in space w/c to power w/c with assistance to manage leg rests and brakes. FUNCTIONAL MOBILITY:   Transfers:   Wheelchair to [employer]: Stand pivot transfer to the R with minimal assistance. Mat to wheelchair. Stand pivot transfer to the L with minimal assistance.   Sit to Stand: Minimal assistance. Self Care: (38 minutes): Procedure(s) (per grid) utilized to improve and/or restore self-care/home management as related to self feeding. Required moderate visual, verbal, manual and tactile cueing to facilitate activities of daily living skills and compensatory activities. Patient demonstrated significant \"shaking\" of his R UE and had difficulty using his R UE for self-feeding. Attempted to use L UE and found that if R UE was supported (pillow under axilla between side and R UE and towels under L wrist) he had more success. He was able to retrieve simulated food (theraputty) in this position and also bring to mouth. ADLs from ADL Navigator: Activities of Daily Living       Feeding  Feeding Assistance: Minimum assistance(using L UE propped on towels - back supported by incline)  Container Management: Moderate assistance(retrieving \"tumbler\" on dycem)  Utensil Management: Minimum assistance(spoon/fork with built up foam/tried weighted spoon.)  Food to Mouth: Minimum assistance  Cues: Verbal cues provided  Adaptive Equipment: Built up spoon, Built up fork, Weighted utensils, Other (comment)(dycem and rimmed plate)                 "[employer] Portal  Treatment/Session Summary:  Patient demonstrated improved ability for self-feeding with the above modifications/adaptations made. He was given multiple types of built up foam to use at home. Patient states he will practice. Also given dycem. .  Continue OT per plan of care. · Response to Treatment:  tolerated without complications. · Communication/Consultation:  None today  · Equipment provided today:  Dycem; multiple foams for utensils for self-feeding  · Recommendations/Intent for next treatment session: Next visit will focus on advancement to more challenging activities. .    Total Treatment Billable Duration:  38 minutes  OT Patient Time In/Time Out  Time In: 0845  Time Out: 0930  JADA Aragon, OTR/L    Future Appointments   Date Time Provider Sara Abdi   9/4/2019  8:45 AM Kylie Das, SLP [facility]   9/4/2019  9:30 AM Mary Jane Salas OTD, OTR/L [facility]   9/4/2019 10:15 AM Nell Schofield, PTA [facility]   9/6/2019  8:45 AM Kylie Das, SLP [facility]   9/6/2019  9:30 AM Amilcar ALTMAN, PTA [facility]   9/6/2019 10:15 AM Mary Jane Salas OTD, OTR/L [facility]   9/9/2019  8:45 AM Colon Linea, DPT [facility]   9/9/2019  9:30 AM Mary Jane Salas OTD, OTR/L [facility]   9/9/2019 10:15 AM Kylie Das, SLP [facility]   9/11/2019  8:45 AM Kylie Das, SLP [facility]   9/11/2019  9:30 AM Colon Linea, DPT [facility]   9/11/2019 10:15 AM Mary Jane Salas OTD, OTR/L [facility]

## 2019-08-28 NOTE — PROGRESS NOTES
Jesus Ibarra  : 1970  Primary: [employer]  Secondary:  [address]  at [facility]  [address], [address], [place], [facility]  [phone]   [phone]        OUTPATIENT SPEECH LANGUAGE PATHOLOGY: Daily Note: 2    ICD-10: Treatment Diagnosis: cognitive communication deficits R 41.841  REFERRING PHYSICIAN: Rashmi Murphy MD MD Orders: speech evaluate and treat  Return Physician Appointment:    PAST MEDICAL HISTORY: TBI, sleep apnea  MEDICAL/REFERRING DIAGNOSIS: TBI (traumatic brain injury) ([facility] [address] [S06.9X9A]  DATE OF ONSET:   PRIOR LEVEL OF FUNCTION: with spouse and children   PRECAUTIONS/ALLERGIES: NKDA   ASSESSMENT:  Pt had a MBS yesterday with recommendations for a regular diet with the following compensatory strategies: upright for all po, 1:1 assistance, alternate solids/liquids and remain upright for 20-30 minutes after po. When pt was asked about the results he reported he was told he has mucous in his throat and he should sleep with some type of incline. He reported he was also told about the esophagus. He recalled recommendations for sitting upright with po. Did not recall recommendations for alternating solids/liquids, small sips or sitting upright for 20-30 minutes after po. Informed pt it is recommended for pt not to lie down for at least an hour after eating. Understanding expressed. He reported any time he eats it feels like it gets stuck mid throat. Therefore, he drinks a lot of water. He reported there are days he can't swallow water as there are days his throat closes. Educated pt on globus sensation as residue was not mentioned on the MBS. Understanding expressed. He reported sometimes mucous accumulates at night and he has to try to cough it out. He reported he does take something for GERD. His spouse said he takes docusate sodium for digestion. He then stated he has episodes of his body getting numb at times.   His spouse said she took him to [facility] once for that in February 23rd. They reported they were told it was just the muscles elaine and that it should pass. Notes from [facility] from February revealed pt went to the ED for chronic pain. Recommendations were for pt to use valium. Notes from [facility] 26th revealed the following: \"Patient is suffering from sequela of traumatic brain injury including muscle spasms of the back, jaw issues, throat issues, parkinsonian-like tremors, gait instability. He is now essentially relegated to a wheelchair. I do believe that a scooter would be of benefit to him. He also is in need of home health care due to his limitations. Referral to neurology is again submitted. It appears that the [address] is helping to some degree at least he does not have the headaches as bad as he used to. His regimen also now includes Valium and he is to continue with Lexapro. For his back spasms he is to restart Flexeril and naproxen. Patient also has uncontrolled hypertension which may also be associated with his traumatic brain injury. \"  His spouse reported he gets red bumps at times with swelling. She reported she massages them and then they dissolve. It is a struggle for him to sleep at night as he feels his throat closes when his head turns to the left. His wife gave him an oxygen mask yesterday as he was feeling like he was having a hard time breathing. She then reported it is a CPAP. Yesterday was more of an issue due to his spasms they reported. When he sleeps on the side he feels like his throat is closing again. In general they have to move him 3 or 4 times a night due to the phlegm. The chair is better for him to sleep at times due to the incline. Suggested for them to get a pulse oximeter to determine if he's anxiety based or oxygen based. Understanding expressed. Asked if the pt takes Muxinex for phlegm. His spouse reported he does not. Informed them they may want to try it. Limited amount of the session was spent targeting specific cognitive tasks other than reviewing MBS results and a few reasoning tasks as the pt and family had lots of questions about other things. They appear to have different concerns each time they come to therapy. Feel some of their concerns are better addressed by the PCP or neurologist as a lot of them aren't speech related and out of my realm. Informed pt and family it would be better to discuss some of their questions with a MD.  Understanding expressed. Patient will benefit from skilled intervention to address the above impairments. ?????? ? ? This section established at most recent assessment??????????  PROBLEM LIST (Impairments causing functional limitations):  1. Decreased cognition  2. Anomia   GOALS: (Goals have been discussed and agreed upon with patient.)  SHORT-TERM FUNCTIONAL GOALS: Time Frame: 3 months   1. Pt will name pictures with 80% accuracy. 2. Pt will complete word finding tasks with 80% accuracy. 3. Pt will complete convergent/divergent naming tasks with 80% accuracy. 4. Pt will state his address with only min cues needed. 5. Pt will state his phone number with only min cues needed. 6. Pt will state his childrens' names with only min cues needed. 7. Pt will complete immediate memory tasks with 80% accuracy. 8. Pt will complete STM tasks with 80% accuracy. 9. Pt will participate in a full cognitive assessment x1. DISCHARGE GOALS: Time Frame: 4-5 months   1. Increased memory and expressive language skills needed for highest level of independent functioning. REHABILITATION POTENTIAL FOR STATED GOALS: FairPLAN OF CARE:  INTERVENTIONS PLANNED: (Benefits and precautions of therapy have been discussed with the patient.)  1. Cognitive tasks  2. Speech tasks  TREATMENT PLAN EFFECTIVE DATES: 7/31/2019 TO 10/30/2019 (90 days).   FREQUENCY/DURATION: Continue to follow patient 2 times a week for 90 days to address above goals.  Regarding Augustus Patrick's therapy, I certify that the treatment plan above will be carried out by a therapist or under their direction. Thank you for this referral,  Vickey Latif, [facility], [facility] PRAFUL DING, [address] [facility], [place], operated by [facility]                     Referring Physician Signature: Sulaiman Tabor MD     Date      SUBJECTIVE:  Pt cooperative. Spouse and  present. Present Symptoms: decreased cognition s/p TBI in 2016      Current Medications: see hard chart    Date Last Reviewed: 8/28/19  Social History/Home Situation: residing with spouse and their 5 children      Work/Activity History:  for [employer]     OBJECTIVE:  Objective Measure: Tool Used: National Outcomes Measurement System: Functional Communication Measures: SPOKEN LANGUAGE EXPRESSION  Score:  Initial: 5 Most Recent: 5 (Date: 8/21/19 )   Interpretation of Tool: This measure describes the change in functional communication status subsequent to speech-language pathology treatment of patients who have spoken language expression deficits. o Level 1:  The individual attempts to speak, but verbalizations are not meaningful to familiar or unfamiliar communication partners at any time. o Level 2:  The individual attempts to speak, although few attempts are accurate or appropriate.   The communication partner must assume responsibility for structuring the communication exchange, and with consistent and maximal cueing, the individual can only occasionally produce automatic and/or imitative words and phrases that are rarely meaningful in context.  o Level 3:  The communication partner must assume responsibility for structuring the communication exchange, and with consistent and moderate cueing, the individual can produce words and phrases that are appropriate and meaningful in context.  o Level 4:  The individual is successfully able to initiate communication using spoken language in simple, structured conversations in routine daily activities with familiar communication partners. The individual usually requires moderate cueing, but is able to demonstrate use of simple sentences (i.e., semantics, syntax, and morphology) and rarely uses complex sentences/messages. o Level 5:  The individual is successfully able to initiate communication using spoken language in structured conversations with both familiar and unfamiliar communication partners. The individual occasionally requires minimal cueing to frame more complex sentences in messages. The individual occasionally self-cues when encountering difficulty. o Level 6:  The individual is successfully able to communicate in most activities, but some limitations in spoken language are still apparent in vocational, avocational, and social activities. The individual rarely requires minimal cueing to frame complex sentences. The individual usually self-cues when encountering difficulty. o Level 7: The individuals ability to successfully and independently participate in vocational, avocational, and social activities is not limited by spoken language skills. Independent functioning may occasionally include use of self-cueing. Score Level 7 Level 6 Level 5 Level 4 Level 3 Level 2 Level 1   Modifier CH CI CJ CK CL CM CN     Mental Status:  Alert    Neuro-Linguistics:  Comparing  items: 2/3  Contrasting items: 0/3 independently; 1/3 with cues. Cognitive Skills Activities: Activities/Procedures listed utilized to improve and/or restore cognitive function as related to thought organization. Required moderate verbal cueing to improve improve recall of information. __________________________________________________________________________________________________  History of Present Injury/Illness (Reason for Referral):    Treatment Assessment:   .   Progression/Medical Necessity:   · Skilled intervention continues to be required due to decreased communication with family/caregivers and decreased cognitive skills. Compliance with Program/Exercises: Will assess as treatment progresses}. Reason for Continuation of Services/Other Comments:  · Patient continues to require skilled intervention due to decreased cognition, anomia. Recommendations/Intent for next treatment session: \"Treatment next visit will focus on cognitive, speech tasks\".      Total Treatment Duration:            Pato Barrow, [facility], [facility] PRAFUL DING, CCC-SLP

## 2019-09-04 ENCOUNTER — HOSPITAL ENCOUNTER (OUTPATIENT)
Dept: PHYSICAL THERAPY | Age: 49
Discharge: HOME | End: 2019-09-04
Payer: COMMERCIAL

## 2019-09-04 PROCEDURE — 97530 THERAPEUTIC ACTIVITIES: CPT

## 2019-09-04 PROCEDURE — 97535 SELF CARE MNGMENT TRAINING: CPT

## 2019-09-04 PROCEDURE — 92507 TX SP LANG VOICE COMM INDIV: CPT

## 2019-09-04 NOTE — PROGRESS NOTES
OT Note (9/4/19): This OT left voice message with RONNELL RN case manager for Mr. Jacky Matta regarding getting a power w/c covered through [facility]. This therapist's phone/office number left for Milly to return call.

## 2019-09-04 NOTE — PROGRESS NOTES
Franki Ward  : 1970  Primary: [employer]  Secondary:  [address]  at [facility]  [address], [address], [place], [address] [facility]  [phone]   [phone]        OUTPATIENT PHYSICAL THERAPY: Daily Treatment Note 2019    ICD-10: Treatment Diagnosis: Unsteadiness on feet (R26.81)  Other lack of coordination (R27.8)  Repeated falls (R29.6)    Pre-treatment Symptoms/Complaints:  Patient reports neck pain 7/10 and   Pain: Initial:   7/10. Post Session:  Some better, but no number rating   Medications Last Reviewed:  2019  Updated Objective Findings:  None Today  TREATMENT:     THERAPEUTIC ACTIVITY: ( 45 minutes): Therapeutic activities per grid below to improve mobility, strength and coordination. Date:  19 Date:  19 Date:  19 Date:  19 Date:  19 Date  19   Activity/Exercise Parameters  Parameters  Parameters      Rhythmic facilitation to decrease tone          Transfers - to and from mat  Min A x 1 person        Bed mobility  Min A to supervision         Sit to stand Min A at walker and bars Min A - throughout session to ll bars and walker  Min A x 5 reps in bars  Min A to mod A with rolling walker CGA in bars and with rolling walker In bars and with rolling walker  In bars and with rolling walker with gait belt and CGA   Static standing 2 x 30 sec at walker,  X 30 sec at bars 4 x 30 sec  4 x 45 seconds in parallel bars 2 x 60' in bars 2 x 60 sec in ll bars    Standing marching 3 x 10 reps in parallel bars X 10 B in ll bars   2 x 20 reps in bars 2 x 10 B     Ambulation  2 x 10 feel in bars  3 x 50' with rolling walker, 2 person assist +wheelchair follow for safety 4 x 10 feet in ll bars with assist of 1    Then with rolling walker   2 x 40 feet with assist of 2 and wheelchair follow 3 x 40' with rolling walker, 2 person assist with wheelchair follow.   Min A to mod A 2 x 60' with weighted rolling walker and 2 person assist min A to CGA- better stability today 2 x 60 feet with weighted rolling walker and 2 person assist with wheelchair follow   Min A to mod A In parallel bars with hand hold and therapist in front with min to mod assist and chair rolling behind x 2 length of bars  Then:    3 x 40 feet  with weighted rolling walker and 2 person assist with wheelchair follow   Min A to mod      Standing in ll bars - tapping foot forward and back   2 x 5 B and assist of 1   X 10  B with assist of 1 X 10  B with assist of 1   Standing in ll bars - tapping foot to side and back   X 5 B then 2 more reps each side  With assist of 1 and 1 sitting rest break   X 10 B with assist of 1    Then forward, out to side back to midline and then back to stance   X  2 x 5 reps B with assist of 1    Then forward, out to side back to midline and then back to stance     With rest between    Standing weight shifts   X 10 reps to the R in bars          THERAPEUTIC EXERCISE: ( 15 minutes):  Exercises per grid below to improve mobility, strength and coordination.        Date:  8-12-19 Date:  8/14/19 Date:  8/21/19 Date:  8/26/19 Date:  8-28-19   Activity/Exercise Parameters Parameters Parameters Parameters    Single knee to chest   Attempted but too much tone fighting      Hamstring stretch   Attempted but patient unable to relax X 60 sec hold B seated in chair  Seated stretch   2 x 30 sec B    Lower trunk rotation  With rocking and compression  Through small ROM- increased pain      Long arc Quads X 10 B with slight compression at shoulders X 2 reps B through small ROM X 10 reps L  Attempted R but unable      Side lying - hip abduction  2 x 5 B        Side lying - R hip flexor stretch  3 x 15 sec hold with slight stretch        Seated at edge of mat  Raising arms - alternating sides with slight compression at shoulders       Sit to stand  X 5        HS curls   X 10 reps L       Ankle pumps   X 10 reps L     Manual stretch to neck flexors    3 x 30 sec- gentle due to increased pain    Cervical chin tuck isometrics    X 8 reps with 3 sec hold 5 x 3 sec hold    Cervical ROM    Through very small ROM due to pain Through small ROM   X 5       MedBridge Portal  Treatment/Session Summary:  Patient fatigues with exercises and ambulation but improving with gait ability and improving with sit to stand with minimal assist with standing. Patient gives good effort. Fatigues and needs sitting rest periods. Patient became dizzy one time during standing exercises and patient instructed to breath during exercises. Patient understood and improved with breathing during exercises.  in with patient during treatment. He will continue to benefit from skilled PT to improve functional mobility. · Response to Treatment:  no adverse reactions. · Communication/Consultation:  None today   · Equipment provided today:  None today  · Recommendations/Intent for next treatment session: Next visit will focus on improving functional mobility and balance.       Total Treatment Billable Duration:  45 minutes   PT Patient Time In/Time Out  Time In: 1015  Time Out: [address], [place]    Future Appointments   Date Time Provider Sara Abdi   9/6/2019  8:45 AM [facility]   9/6/2019  9:30 AM [facility]TMAN [facility]   9/6/2019 10:15 AM Nelwyn Cancer, OTD, OTR/L [facility]   9/9/2019  8:45 AM Iwona Branch DPT [facility]   9/9/2019  9:30 AM Nelwyn Cancer, OTD, OTR/L [facility]   9/9/2019 10:15 AM Jacob Das SLP [facility]   9/11/2019  8:45 AM Jacob Das SLP [facility]   9/11/2019  9:30 AM Iwona Branch DPT [facility]   9/11/2019 10:15 AM Nelwyn Cancer, OTD, OTR/L [facility]

## 2019-09-04 NOTE — PROGRESS NOTES
Manuela Devlin  : 1970  Primary: [employer]  Secondary:  [address] at [facility]  [address], [address], [place], [facility] [address] [facility]  [phone]   [phone]        OUTPATIENT SPEECH LANGUAGE PATHOLOGY: Progress Report    ICD-10: Treatment Diagnosis: cognitive communication deficits R 41.841  REFERRING PHYSICIAN: Tremayne Hunt MD MD Orders: speech evaluate and treat  Return Physician Appointment:    PAST MEDICAL HISTORY: TBI, sleep apnea  MEDICAL/REFERRING DIAGNOSIS: TBI (traumatic brain injury) ([facility] [address] [S06.9X9A]  DATE OF ONSET:   PRIOR LEVEL OF FUNCTION: with spouse and children   PRECAUTIONS/ALLERGIES: NKDA   ASSESSMENT:  Pt has attended 7 sessions due to decreased cognition. He did well with naming object pictures this date. However, he reported some of the pictures were hard to see. His spouse reported the pt has been complaining of a burning sensation in his eyes recently. She reported they are waiting to hear from the ophthalmologist about an appointment. He exhibited difficulties with creating two sentences given two words this date at times due to grammar difficulties vs anomia. Memory deficits persist though he is making progress. Patient will benefit from skilled intervention to address the above impairments. ?????? ? ? This section established at most recent assessment??????????  PROBLEM LIST (Impairments causing functional limitations):  1. Decreased cognition  2. Anomia   GOALS: (Goals have been discussed and agreed upon with patient.)  SHORT-TERM FUNCTIONAL GOALS: Time Frame: 3 months   1. Pt will name pictures with 80% accuracy. Goal met. 2. Pt will complete word finding tasks with 80% accuracy. Goal not met. 3. Pt will complete convergent/divergent naming tasks with 80% accuracy. Goal ongoing. 4. Pt will state his address with only min cues needed. Goal ongoing. 5. Pt will state his phone number with only min cues needed. Goal ongoing. 6. Pt will state his childrens' names with only min cues needed. Goal ongoing. 7. Pt will complete immediate memory tasks with 80% accuracy. Goal not met. 8. Pt will complete STM tasks with 80% accuracy. Goal not met. 9. Pt will participate in a full cognitive assessment x1. Goal ongoing. DISCHARGE GOALS: Time Frame: 4-5 months   1. Increased memory and expressive language skills needed for highest level of independent functioning. REHABILITATION POTENTIAL FOR STATED GOALS: FairPLAN OF CARE:  INTERVENTIONS PLANNED: (Benefits and precautions of therapy have been discussed with the patient.)  1. Cognitive tasks  2. Speech tasks  TREATMENT PLAN EFFECTIVE DATES: 7/31/2019 TO 10/30/2019 (90 days). FREQUENCY/DURATION: Continue to follow patient 2 times a week for 90 days to address above goals. Regarding [address] [place] [address] Road therapy, I certify that the treatment plan above will be carried out by a therapist or under their direction. Thank you for this referral,  Toan Melgoza, [facility], [facility], [address] [place] [address]                     Referring Physician Signature: Ashu Jarrett MD     Date      SUBJECTIVE:  Pt's spouse reported they were late to the session today as their ride did not pick them up. Therefore, she brought the pt. Present Symptoms: decreased cognition s/p TBI in 2016      Current Medications: see hard chart    Date Last Reviewed: 8/28/19  Social History/Home Situation: residing with spouse and their 5 children      Work/Activity History:  for [employer]     OBJECTIVE:  Objective Measure: Tool Used: National Outcomes Measurement System: Functional Communication Measures: SPOKEN LANGUAGE EXPRESSION  Score:  Initial: 5 Most Recent: 5 (Date: 9/4/19 )   Interpretation of Tool: This measure describes the change in functional communication status subsequent to speech-language pathology treatment of patients who have spoken language expression deficits.   o Level 1:  The individual attempts to speak, but verbalizations are not meaningful to familiar or unfamiliar communication partners at any time. o Level 2:  The individual attempts to speak, although few attempts are accurate or appropriate. The communication partner must assume responsibility for structuring the communication exchange, and with consistent and maximal cueing, the individual can only occasionally produce automatic and/or imitative words and phrases that are rarely meaningful in context.  o Level 3:  The communication partner must assume responsibility for structuring the communication exchange, and with consistent and moderate cueing, the individual can produce words and phrases that are appropriate and meaningful in context.  o Level 4:  The individual is successfully able to initiate communication using spoken language in simple, structured conversations in routine daily activities with familiar communication partners. The individual usually requires moderate cueing, but is able to demonstrate use of simple sentences (i.e., semantics, syntax, and morphology) and rarely uses complex sentences/messages. o Level 5:  The individual is successfully able to initiate communication using spoken language in structured conversations with both familiar and unfamiliar communication partners. The individual occasionally requires minimal cueing to frame more complex sentences in messages. The individual occasionally self-cues when encountering difficulty. o Level 6:  The individual is successfully able to communicate in most activities, but some limitations in spoken language are still apparent in vocational, avocational, and social activities. The individual rarely requires minimal cueing to frame complex sentences. The individual usually self-cues when encountering difficulty. o Level 7:   The individuals ability to successfully and independently participate in vocational, avocational, and social activities is not limited by spoken language skills. Independent functioning may occasionally include use of self-cueing. Score Level 7 Level 6 Level 5 Level 4 Level 3 Level 2 Level 1   Modifier CH CI CJ CK CL CM CN     Mental Status:  Alert    Expressive language:  Naming object pictures: 90%. Naming occupation pictures: 90%. Creating 2 sentences given 2 words: 2/3. Creating 1 sentence: 3/3. Cognitive Skills Activities: Activities/Procedures listed utilized to improve and/or restore cognitive function as related to thought organization. Required moderate verbal cueing to improve improve recall of information. __________________________________________________________________________________________________  History of Present Injury/Illness (Reason for Referral):    Treatment Assessment:   . Progression/Medical Necessity:   · Skilled intervention continues to be required due to decreased communication with family/caregivers and decreased cognitive skills. Compliance with Program/Exercises: Will assess as treatment progresses}. Reason for Continuation of Services/Other Comments:  · Patient continues to require skilled intervention due to decreased cognition, anomia. Recommendations/Intent for next treatment session: \"Treatment next visit will focus on cognitive, speech tasks\".      Total Treatment Duration:  Time In: 0900  Time Out: [address], CCC-SLP

## 2019-09-04 NOTE — PROGRESS NOTES
Dilma Shetty  : 1970  Primary: [employer]  Secondary:  [address] at [facility]  [address] [facility] [address], [address], [place], [address] [facility]  [phone]   [phone]      OUTPATIENT OCCUPATIONAL THERAPY: Daily Treatment Note 2019  Visit Count:  19    ICD-10: Treatment Diagnosis:  Unspecified lack of coordination (R27.9); Dependence on wheelchair (Z99.3); History of falling (Z91.81)  Precautions/Allergies:   Patient has no allergy information on record. TREATMENT PLAN:  Effective Dates: 2019 TO 11/3/2019 (90 days). Frequency/Duration: 2 times a week for 90 Day(s)    Pre-treatment Symptoms/Complaints: Interpretor Elham Schaeffer and Iwona/Sushma present for session. Patient's wife states they haven't heard any more about the wheelchair. Pain: Initial: Pain Intensity 1: (back - did not rate)  Post Session:  same/10   Medications Last Reviewed:  2019   Updated Objective Findings:  None Today   TREATMENT:     Therapeutic Activity: (    4 minutes): Therapeutic activities including Bed transfers, Chair transfers and w/c transfers to improve mobility, strength, balance and coordination. Required moderate   to promote static and dynamic balance in sitting and promote coordination of bilateral, upper extremity(s), trunk. Patient sat edge of mat ~5 minutes. Patient assisted to and from the w/c to mat/mat to w/c  from tilt in space w/c to power w/c with assistance to manage leg rests and brakes. FUNCTIONAL MOBILITY:   Transfers:   Wheelchair to [employer]: Stand pivot transfer to the R with minimal assistance. Mat to wheelchair. Stand pivot transfer to the L with minimal assistance. Sit to Stand: Minimal assistance. Self Care: (38 minutes): Procedure(s) (per grid) utilized to improve and/or restore self-care/home management as related to self feeding.  Required moderate visual, verbal, manual and tactile cueing to facilitate activities of daily living skills and compensatory activities. Patient demonstrated significant \"shaking\" of his R UE and had difficulty using his R UE to manipulate adaptive equipment. He demonstrated difficulty lifting his R LE sitting edge of mat and was assisted to lift it for donning his shoes. He also demonstrated understanding of use of long handled sponge. ADLs from ADL Navigator:  Grooming/Bathing/Dressing Activities of Daily Living                           Lower Body Dressing Assistance  Socks: Stand-by assistance, Compensatory technique training  Slip on Shoes with Back: Minimum assistance, Compensatory technique training(to lift R leg)  Leg Crossed Method Used: No  Position Performed: Seated edge of bed  Cues: Suzanne Bal  Adaptive Equipment Used: Dressing stick, Long handled shoe horn, Sock aid Bed/Mat Mobility  Sit to Stand: Minimum assistance  Stand to Sit: Minimum assistance  Bed to Chair: Minimum assistance     [facility]  Treatment/Session Summary:  Patient demonstrated improved ability for lower body dressing with the above modifications/adaptations made. He would benefit from long handled shoe horn, dressing stick, reacher and sock aide - plan to assist patient with obtaining. Plan to contact  regarding power wheelchair. Continue OT per plan of care. · Response to Treatment:  tolerated without complications. · Communication/Consultation:  None today  · Equipment provided today:  Dycem; multiple foams for utensils for self-feeding  · Recommendations/Intent for next treatment session: Next visit will focus on advancement to more challenging activities. .    Total Treatment Billable Duration:  38 minutes  OT Patient Time In/Time Out  Time In: 0930  Time Out: 1015  Camilla Chavez OTR/L    Future Appointments   Date Time Provider Sara Abdi   9/6/2019  8:45 AM Delaney Das, SLP [facility]   9/6/2019  9:30 AM Larry Linton PTA [facility]   9/6/2019 10:15 AM JADA Beth, OTR/L [id number]   9/9/2019  8:45 AM Leonardo Albarado DPT [facility]   9/9/2019  9:30 AM Jenifer Schooling, OTD, OTR/L [facility] D   9/9/2019 10:15 AM Cherrie Das SLP [facility]   9/11/2019  8:45 AM Cherrie Das SLP [facility]   9/11/2019  9:30 AM Leonardo Albarado DPT [facility]   9/11/2019 10:15 AM Jenifer Schooling, OTD, OTR/L [facility]

## 2019-09-06 ENCOUNTER — HOSPITAL ENCOUNTER (OUTPATIENT)
Dept: PHYSICAL THERAPY | Age: 49
Discharge: HOME | End: 2019-09-06
Payer: COMMERCIAL

## 2019-09-06 PROCEDURE — 97530 THERAPEUTIC ACTIVITIES: CPT

## 2019-09-06 PROCEDURE — 92507 TX SP LANG VOICE COMM INDIV: CPT

## 2019-09-06 PROCEDURE — 97535 SELF CARE MNGMENT TRAINING: CPT

## 2019-09-06 NOTE — PROGRESS NOTES
Penny Bang  : 1970  Primary: [employer]  Secondary:  [address]  at [facility]  [address], [address], [place], [facility] [address] [facility]  [phone]   [phone]        OUTPATIENT PHYSICAL THERAPY: Daily Treatment Note 2019    ICD-10: Treatment Diagnosis: Unsteadiness on feet (R26.81)  Other lack of coordination (R27.8)  Repeated falls (R29.6)    Pre-treatment Symptoms/Complaints:  Patient reports neck and back pain to be 7/10. He also reports left leg feeling more numb today. Pain: Initial:   7/10. Post Session:  Some better, but no number rating   Medications Last Reviewed:  2019  Updated Objective Findings:  None Today  TREATMENT:     THERAPEUTIC ACTIVITY: ( 40 minutes): Therapeutic activities per grid below to improve mobility, strength and coordination. Date:  19 Date:  19 Date:  19 Date:  19 Date  19 Date:  19   Activity/Exercise  Parameters  Parameters       Rhythmic facilitation to decrease tone          Transfers - to and from mat          Bed mobility          Sit to stand Min A - throughout session to ll bars and walker  Min A x 5 reps in bars  Min A to mod A with rolling walker CGA in bars and with rolling walker In bars and with rolling walker  In bars and with rolling walker with gait belt and CGA In bars and with rolling walker with gait belt and CGA   Static standing 4 x 30 sec  4 x 45 seconds in parallel bars 2 x 60' in bars 2 x 60 sec in ll bars  2 x 60 sec with ll bars    Standing marching X 10 B in ll bars   2 x 20 reps in bars 2 x 10 B      Ambulation  4 x 10 feet in ll bars with assist of 1    Then with rolling walker   2 x 40 feet with assist of 2 and wheelchair follow 3 x 40' with rolling walker, 2 person assist with wheelchair follow.   Min A to mod A 2 x 60' with weighted rolling walker and 2 person assist min A to CGA- better stability today 2 x 60 feet with weighted rolling walker and 2 person assist with wheelchair follow   Min A to mod A In parallel bars with hand hold and therapist in front with min to mod assist and chair rolling behind x 2 length of bars  Then:    3 x 40 feet  with weighted rolling walker and 2 person assist with wheelchair follow   Min A to mod    In parallel bars with hands on rails and therapist assist with chair following behind him x 1          1 x 45 ft  1 x 60 ft with weighted walker and 2 person assist with wheelchair follow min to mod A    Standing in ll bars - tapping foot forward and back  2 x 5 B and assist of 1   X 10  B with assist of 1 X 10  B with assist of 1 X 10 B  With assist of 1   Standing in ll bars - tapping foot to side and back  X 5 B then 2 more reps each side  With assist of 1 and 1 sitting rest break   X 10 B with assist of 1    Then forward, out to side back to midline and then back to stance   X  2 x 5 reps B with assist of 1    Then forward, out to side back to midline and then back to stance     With rest between  X 10 B with assist of 1                 Standing weight shifts  X 10 reps to the R in bars           THERAPEUTIC EXERCISE: ( 5 minutes):  Exercises per grid below to improve mobility, strength and coordination.        Date:  8-12-19 Date:  8/14/19 Date:  8/21/19 Date:  8/26/19 Date:  8-28-19 Date  9-6-19   Activity/Exercise Parameters Parameters Parameters Parameters     Single knee to chest   Attempted but too much tone fighting       Hamstring stretch   Attempted but patient unable to relax X 60 sec hold B seated in chair  Seated stretch   2 x 30 sec B  Seated stretch 2 x 30 sec hold    Lower trunk rotation  With rocking and compression  Through small ROM- increased pain       Long arc Quads X 10 B with slight compression at shoulders X 2 reps B through small ROM X 10 reps L  Attempted R but unable    X 5 B   Side lying - hip abduction  2 x 5 B         Side lying - R hip flexor stretch  3 x 15 sec hold with slight stretch Seated at edge of mat  Raising arms - alternating sides with slight compression at shoulders        Sit to stand  X 5         HS curls   X 10 reps L        Ankle pumps   X 10 reps L      Manual stretch to neck flexors    3 x 30 sec- gentle due to increased pain     Cervical chin tuck isometrics    X 8 reps with 3 sec hold 5 x 3 sec hold     Cervical ROM    Through very small ROM due to pain Through small ROM   X 5        MedBridge Portal  Treatment/Session Summary:  Patient reported thru  that his L leg feels more numb today. He was able to ambulate a little further today in at each time. He is improving with endurance and wife reports transfers at home are easier. His left leg did try to buckle twice today, but no pain etc. Patient gives good effort. Fatigues, but with rest recovers well. No dizziness reported today.  present during treatment. He will continue to benefit from skilled PT to improve functional mobility. · Response to Treatment:  no adverse reactions. · Communication/Consultation:  None today   · Equipment provided today:  None today  · Recommendations/Intent for next treatment session: Next visit will focus on improving functional mobility and balance.       Total Treatment Billable Duration:  45 minutes   PT Patient Time In/Time Out  Time In: 0930  Time Out: [address] [facility], [facility]    Future Appointments   Date Time Provider Sara Abdi   9/9/2019  8:45 AM Mark Bhatia DPT [facility]   9/9/2019  9:30 AM JADA Mcconnell, OTR/L [facility]   9/9/2019 10:15 AM Luis Das SLP [facility]   9/11/2019  8:45 AM Luis Das SLP [facility]   9/11/2019  9:30 AM JAKE Connell [facility]   9/11/2019 10:15 AM JADA Mcconnell, OTR/L [facility]

## 2019-09-06 NOTE — PROGRESS NOTES
Rahul Arechiga  : 1970  Primary: [employer]  Secondary:  [address]  at [facility]  [address], [address], [place] [address] [address] [facility]  [phone]   [phone]        OUTPATIENT SPEECH LANGUAGE PATHOLOGY: Daily Note: 1    ICD-10: Treatment Diagnosis: cognitive communication deficits R 41.841  REFERRING PHYSICIAN: Claudine Roe MD MD Orders: speech evaluate and treat  Return Physician Appointment:    PAST MEDICAL HISTORY: TBI, sleep apnea  MEDICAL/REFERRING DIAGNOSIS: TBI (traumatic brain injury) ([facility] [address] [S06.9X9A]  DATE OF ONSET:   PRIOR LEVEL OF FUNCTION: with spouse and children   PRECAUTIONS/ALLERGIES: NKDA   ASSESSMENT:  Pt did well with comparing and contrasting items this date. He missed one sentence as he deleted one of the words. Pt's spouse said the pt's memory varies as there are times where he cannot recall where the bathroom is in their house. Patient will benefit from skilled intervention to address the above impairments. ?????? ? ? This section established at most recent assessment??????????  PROBLEM LIST (Impairments causing functional limitations):  1. Decreased cognition  2. Anomia   GOALS: (Goals have been discussed and agreed upon with patient.)  SHORT-TERM FUNCTIONAL GOALS: Time Frame: 3 months   1. Pt will complete word finding tasks with 80% accuracy. 2. Pt will complete convergent/divergent naming tasks with 80% accuracy. 3. Pt will state his address with only min cues needed. 4. Pt will state his phone number with only min cues needed. 5. Pt will state his childrens' names with only min cues needed. 6. Pt will complete immediate memory tasks with 80% accuracy. 7. Pt will complete STM tasks with 80% accuracy. 8. Pt will participate in a full cognitive assessment x1. DISCHARGE GOALS: Time Frame: 4-5 months   1.  Increased memory and expressive language skills needed for highest level of independent functioning. REHABILITATION POTENTIAL FOR STATED GOALS: FairPLAN OF CARE:  INTERVENTIONS PLANNED: (Benefits and precautions of therapy have been discussed with the patient.)  1. Cognitive tasks  2. Speech tasks  TREATMENT PLAN EFFECTIVE DATES: 7/31/2019 TO 10/30/2019 (90 days). FREQUENCY/DURATION: Continue to follow patient 2 times a week for 90 days to address above goals. Regarding [address] therapy, I certify that the treatment plan above will be carried out by a therapist or under their direction. Thank you for this referral,  Yisel Villanueva, [facility], [facility] [place], [address] [facility]                     Referring Physician Signature: Aleyda Medina MD     Date      SUBJECTIVE:  Pt's spouse reported they were late to the session today as their ride did not pick them up. Therefore, she brought the pt. Present Symptoms: decreased cognition s/p TBI in 2016      Current Medications: see hard chart    Date Last Reviewed: 8/28/19  Social History/Home Situation: residing with spouse and their 5 children      Work/Activity History:  for [employer]     OBJECTIVE:  Objective Measure: Tool Used: National Outcomes Measurement System: Functional Communication Measures: SPOKEN LANGUAGE EXPRESSION  Score:  Initial: 5 Most Recent: 5 (Date: 9/4/19 )   Interpretation of Tool: This measure describes the change in functional communication status subsequent to speech-language pathology treatment of patients who have spoken language expression deficits. o Level 1:  The individual attempts to speak, but verbalizations are not meaningful to familiar or unfamiliar communication partners at any time. o Level 2:  The individual attempts to speak, although few attempts are accurate or appropriate.   The communication partner must assume responsibility for structuring the communication exchange, and with consistent and maximal cueing, the individual can only occasionally produce automatic and/or imitative words and phrases that are rarely meaningful in context.  o Level 3:  The communication partner must assume responsibility for structuring the communication exchange, and with consistent and moderate cueing, the individual can produce words and phrases that are appropriate and meaningful in context.  o Level 4:  The individual is successfully able to initiate communication using spoken language in simple, structured conversations in routine daily activities with familiar communication partners. The individual usually requires moderate cueing, but is able to demonstrate use of simple sentences (i.e., semantics, syntax, and morphology) and rarely uses complex sentences/messages. o Level 5:  The individual is successfully able to initiate communication using spoken language in structured conversations with both familiar and unfamiliar communication partners. The individual occasionally requires minimal cueing to frame more complex sentences in messages. The individual occasionally self-cues when encountering difficulty. o Level 6:  The individual is successfully able to communicate in most activities, but some limitations in spoken language are still apparent in vocational, avocational, and social activities. The individual rarely requires minimal cueing to frame complex sentences. The individual usually self-cues when encountering difficulty. o Level 7: The individuals ability to successfully and independently participate in vocational, avocational, and social activities is not limited by spoken language skills. Independent functioning may occasionally include use of self-cueing. Score Level 7 Level 6 Level 5 Level 4 Level 3 Level 2 Level 1   Modifier CH CI CJ CK CL CM CN     Mental Status:  Alert    Expressive language:  Creating 1 sentence given 2 words: 60% accuracy. Comparing items: 85%. Contrasting items: 85%.     Cognitive Skills Activities: Activities/Procedures listed utilized to improve and/or restore cognitive function as related to thought organization. Required moderate verbal cueing to improve improve recall of information. __________________________________________________________________________________________________  History of Present Injury/Illness (Reason for Referral):    Treatment Assessment:   . Progression/Medical Necessity:   · Skilled intervention continues to be required due to decreased communication with family/caregivers and decreased cognitive skills. Compliance with Program/Exercises: Will assess as treatment progresses}. Reason for Continuation of Services/Other Comments:  · Patient continues to require skilled intervention due to decreased cognition, anomia. Recommendations/Intent for next treatment session: \"Treatment next visit will focus on cognitive, speech tasks\".      Total Treatment Duration:  Time In: 6015  Time Out: 615 [facility], [facility] [employer], [facility] G[facility]EL [place], CCC-SLP

## 2019-09-06 NOTE — PROGRESS NOTES
OT Note (9/6/19 at 0830 AM): This OT left another voice message with Kehinde Sy, RN case manager for Mr. Merly Vasquez regarding getting a power w/c covered through [employer]. This therapist's phone/office number left for Milly to return call. Late Note (9/6/19 at 0841 AM): Aruna Brooks called this therapist back stating she plans to talk with the  regarding this therapist's request.  She asks if this phone number is a good number to contact me with and this therapist stated it was ([phone]).

## 2019-09-06 NOTE — PROGRESS NOTES
Tamy Eden  : 1970  Primary: [employer]  Secondary:  [address] at [facility]  [address] [facility] [address], [address], [place], [address] [facility]  [phone]   [phone]      OUTPATIENT OCCUPATIONAL THERAPY: Daily Treatment Note 2019  Visit Count:  23    ICD-10: Treatment Diagnosis:  Unspecified lack of coordination (R27.9); Dependence on wheelchair (Z99.3); History of falling (Z91.81)  Precautions/Allergies:   Patient has no allergy information on record. TREATMENT PLAN:  Effective Dates: 2019 TO 11/3/2019 (90 days). Frequency/Duration: 2 times a week for 90 Day(s)    Pre-treatment Symptoms/Complaints: Interpretor Ottoniel Burns and Iwona/Sushma present for session. Patient's wife states they haven't heard any more about the wheelchair. Pain: Initial: Pain Intensity 1: (back pain - did not rate)  Post Session:  same/10   Medications Last Reviewed:  2019   Updated Objective Findings:  None Today   TREATMENT:     Therapeutic Activity: (    10 minutes): Therapeutic activities including Bed transfers, Chair transfers and w/c transfers to improve mobility, strength, balance and coordination. Required moderate   to promote static and dynamic balance in sitting and promote coordination of bilateral, upper extremity(s), trunk. Patient sat edge of mat ~5 minutes. Patient assisted to and from the w/c to mat/mat to w/c. He pivoted to the R to transfer to the L due to shaking in his leg. FUNCTIONAL MOBILITY:   Transfers:   Wheelchair to [employer]: Stand pivot transfer to the R with minimal assistance. Mat to wheelchair. Stand pivot transfer to the L with minimal assistance. Sit to Stand: Minimal assistance. Self Care: (43 minutes): Procedure(s) (per grid) utilized to improve and/or restore self-care/home management as related to self feeding.  Required moderate visual, verbal, manual and tactile cueing to facilitate activities of daily living skills and compensatory activities. Patient demonstrated significant \"shaking\" of his R UE and had difficulty using his R UE to manipulate adaptive equipment. He demonstrated difficulty lifting his R LE sitting edge of mat and was assisted to lift it for donning his shoes. He also demonstrated understanding of use of long handled sponge. ADLs from ADL Navigator:  Grooming/Bathing/Dressing Activities of Daily Living                     Upper Body Dressing Assistance  Front Opened Shirt: Stand-by assistance(R sleeve on first; off last)  Cues: Verbal cues provided     Lower Body Dressing Assistance  Pants With Elastic Waist: Compensatory technique training, Minimum assistance(required assistance to stand to pull up)  Slip on Shoes with Back: Minimum assistance(to lift R leg - placed \"wedge\" underneath thgh)  Leg Crossed Method Used: No  Position Performed: Seated edge of bed  Cues: Chantal Borges  Adaptive Equipment Used: Long handled shoe horn, Reacher, Dressing stick Bed/Mat Mobility  Sit to Stand: Minimum assistance  Stand to Sit: Minimum assistance  Bed to Chair: Minimum assistance  Scooting: Supervision  Cues: Verbal cues provided, Physical assistance     [employer] Portal  Treatment/Session Summary:  Patient demonstrated improved ability for upper/lower body dressing with the above modifications/adaptations made.  to discuss power w/c with  per conversation this morning with Rusty Correa RN. Continue OT per plan of care. · Response to Treatment:  tolerated without complications. · Communication/Consultation:  None today  · Equipment provided today:  given long handled sponge last visit. · Recommendations/Intent for next treatment session: Next visit will focus on advancement to more challenging activities. .    Total Treatment Billable Duration:  53 minutes  OT Patient Time In/Time Out  Time In: 1015  Time Out: 1110  GERALD Nunn/L    Future Appointments   Date Time Provider Sara Abdi   9/9/2019  8:45 AM JAKE [facility]   9/9/2019  9:30 AM JADA Pineda, OTR/L [facility]   9/9/2019 10:15 AM Demarco Das SLP [facility]   9/11/2019  8:45 AM Demarco Das SLP [facility] D   9/11/2019  9:30 AM JAKE [facility]   9/11/2019 10:15 AM JADA Pineda, OTR/L [facility]

## 2019-09-09 ENCOUNTER — HOSPITAL ENCOUNTER (OUTPATIENT)
Dept: PHYSICAL THERAPY | Age: 49
Discharge: HOME | End: 2019-09-09
Payer: COMMERCIAL

## 2019-09-09 PROCEDURE — 97530 THERAPEUTIC ACTIVITIES: CPT

## 2019-09-09 PROCEDURE — 97110 THERAPEUTIC EXERCISES: CPT

## 2019-09-09 PROCEDURE — 92507 TX SP LANG VOICE COMM INDIV: CPT

## 2019-09-09 PROCEDURE — 97535 SELF CARE MNGMENT TRAINING: CPT

## 2019-09-09 NOTE — PROGRESS NOTES
Jana Huang  : 1970  Primary: [employer]  Secondary:  [address] at [facility]  [address] [facility] [address], [address], [place], [address] [facility]  [phone]   [phone]      OUTPATIENT OCCUPATIONAL THERAPY: Daily Treatment Note 2019  Visit Count:  25    ICD-10: Treatment Diagnosis:  Unspecified lack of coordination (R27.9); Dependence on wheelchair (Z99.3); History of falling (Z91.81)  Precautions/Allergies:   Patient has no allergy information on record. TREATMENT PLAN:  Effective Dates: 2019 TO 11/3/2019 (90 days). Frequency/Duration: 2 times a week for 90 Day(s)    Pre-treatment Symptoms/Complaints: Maryann Allen present for session. Patient states he is tired. His wife states when he lays on his back it hurts his neck so his wife lays him on his R side. States they do not have any doctor appointments coming up that they are aware of.   Pain: Initial: Pain Intensity 1: 8  Pain Location 1: Back, Head(\"all over\")  Post Session:  same/10   Medications Last Reviewed:  2019   Updated Objective Findings:  Systolic BP: 344-378 range per manual testing (difficulty completing due to shakiing and automatic BP diffficult to check as well). TREATMENT:     Therapeutic Activity: (    23 minutes): Therapeutic activities including Bed transfers, Chair transfers and w/c transfers to improve mobility, strength, balance and coordination. Required moderate   to promote static and dynamic balance in sitting and promote coordination of bilateral, upper extremity(s), trunk. Patient sat edge of mat ~5 minutes. Patient assisted to and from the w/c to mat/mat to w/c. He pivoted to the R to transfer to the L due to shaking in his leg. Patient also tried coordination tasks with hIs LUE with difficulty due to shaking - digit abduction and thumb to finger opposition.    FUNCTIONAL MOBILITY:   Transfers:   Wheelchair to [employer]: Stand pivot transfer to the R with minimal assistance. Mat to wheelchair. Stand pivot transfer to the L with minimal assistance. Sit to Stand: Minimal assistance. Self Care: (15 minutes): Procedure(s) (per grid) utilized to improve and/or restore self-care/home management as related to self feeding. Required moderate visual, verbal, manual and tactile cueing to facilitate activities of daily living skills and compensatory activities. Patient demonstrated significant \"shaking\" of his R UE/R LE and had difficulty using his R UE or L UE to manipulate objects (I.e. Buttoning a shirt). He was not able to button another shirt, but he was able to unbutton his own. An ankle weight  (10 lb) and pad was placed under his R LE sitting edge of mat to reduce shaking with marginal improvement noted. ADLs from ADL Navigator:  Grooming/Bathing/Dressing Activities of Daily Living                                   [employer] Portal  Treatment/Session Summary:  Patient struggled to complete basic tasks today due to \"shaking. \"  Continue OT per plan of care. · Response to Treatment:  tolerated without complications. · Communication/Consultation:  None today  · Equipment provided today:  given long handled sponge last visit. · Recommendations/Intent for next treatment session: Next visit will focus on advancement to more challenging activities. .    Total Treatment Billable Duration:  38 minutes  OT Patient Time In/Time Out  Time In: 0930  Time Out: 1015  GERALD Roberts/L    Future Appointments   Date Time Provider Sara Abdi   9/11/2019  8:45 AM [facility]   9/11/2019  9:30 AM Bianka Manning DPT [facility]   9/11/2019 10:15 AM Raymona Babinski, OTD, OTR/L [id number]

## 2019-09-09 NOTE — PROGRESS NOTES
Fabiana Booth  : 1970  Primary: [employer]  Secondary:  [address]  at [facility]  [address], [address], [place] [address] [address] [facility]  [phone]   [phone]        OUTPATIENT SPEECH LANGUAGE PATHOLOGY: Daily Note: 2    ICD-10: Treatment Diagnosis: cognitive communication deficits R 41.841  REFERRING PHYSICIAN: Radha Meadows MD MD Orders: speech evaluate and treat  Return Physician Appointment:    PAST MEDICAL HISTORY: TBI, sleep apnea  MEDICAL/REFERRING DIAGNOSIS: TBI (traumatic brain injury) ([facility] [address] [S06.9X9A]  DATE OF ONSET:   PRIOR LEVEL OF FUNCTION: with spouse and children   PRECAUTIONS/ALLERGIES: NKDA   ASSESSMENT:  Pt reported he recalled he was trying to button his shirt in therapy this date. He reported he wasn't able to button it because he is feeling a lot of pressure in his head today. He also reported his vision isn't working too well this date. His wife reported the pt's headache began when he was standing in the bars today. He began hearing a \"bell\" in occupational therapy this date. The pt reported bright colors make his head hurt worse. Pt did well with answering single questions re: details in paragraphs and with completing stories. Patient will benefit from skilled intervention to address the above impairments. ?????? ? ? This section established at most recent assessment??????????  PROBLEM LIST (Impairments causing functional limitations):  1. Decreased cognition  2. Anomia   GOALS: (Goals have been discussed and agreed upon with patient.)  SHORT-TERM FUNCTIONAL GOALS: Time Frame: 3 months   1. Pt will name pictures with 80% accuracy. 2. Pt will complete word finding tasks with 80% accuracy. 3. Pt will complete convergent/divergent naming tasks with 80% accuracy. 4. Pt will state his address with only min cues needed. 5. Pt will state his phone number with only min cues needed.    6. Pt will state his childrens' names with only min cues needed. 7. Pt will complete immediate memory tasks with 80% accuracy. 8. Pt will complete STM tasks with 80% accuracy. 9. Pt will participate in a full cognitive assessment x1. DISCHARGE GOALS: Time Frame: 4-5 months   1. Increased memory and expressive language skills needed for highest level of independent functioning. REHABILITATION POTENTIAL FOR STATED GOALS: FairPLAN OF CARE:  INTERVENTIONS PLANNED: (Benefits and precautions of therapy have been discussed with the patient.)  1. Cognitive tasks  2. Speech tasks  TREATMENT PLAN EFFECTIVE DATES: 7/31/2019 TO 10/30/2019 (90 days). FREQUENCY/DURATION: Continue to follow patient 2 times a week for 90 days to address above goals. Regarding [address] therapy, I certify that the treatment plan above will be carried out by a therapist or under their direction. Thank you for this referral,  Talon Brown, [facility], [facility] MEDICO Critical access hospital [place], [address] [facility]                     Referring Physician Signature: Chuckie Dejesus MD     Date      SUBJECTIVE:  Pt cooperative. Spouse and  present. Present Symptoms: decreased cognition s/p TBI in 2016      Current Medications: see hard chart    Date Last Reviewed: 8/21/19  Social History/Home Situation: residing with spouse and their 5 children      Work/Activity History:  for [employer]     OBJECTIVE:  Objective Measure: Tool Used: National Outcomes Measurement System: Functional Communication Measures: SPOKEN LANGUAGE EXPRESSION  Score:  Initial: 5 Most Recent: 5 (Date: 8/21/19 )   Interpretation of Tool: This measure describes the change in functional communication status subsequent to speech-language pathology treatment of patients who have spoken language expression deficits. o Level 1:  The individual attempts to speak, but verbalizations are not meaningful to familiar or unfamiliar communication partners at any time.   o Level 2:  The individual attempts to speak, although few attempts are accurate or appropriate. The communication partner must assume responsibility for structuring the communication exchange, and with consistent and maximal cueing, the individual can only occasionally produce automatic and/or imitative words and phrases that are rarely meaningful in context.  o Level 3:  The communication partner must assume responsibility for structuring the communication exchange, and with consistent and moderate cueing, the individual can produce words and phrases that are appropriate and meaningful in context.  o Level 4:  The individual is successfully able to initiate communication using spoken language in simple, structured conversations in routine daily activities with familiar communication partners. The individual usually requires moderate cueing, but is able to demonstrate use of simple sentences (i.e., semantics, syntax, and morphology) and rarely uses complex sentences/messages. o Level 5:  The individual is successfully able to initiate communication using spoken language in structured conversations with both familiar and unfamiliar communication partners. The individual occasionally requires minimal cueing to frame more complex sentences in messages. The individual occasionally self-cues when encountering difficulty. o Level 6:  The individual is successfully able to communicate in most activities, but some limitations in spoken language are still apparent in vocational, avocational, and social activities. The individual rarely requires minimal cueing to frame complex sentences. The individual usually self-cues when encountering difficulty. o Level 7: The individuals ability to successfully and independently participate in vocational, avocational, and social activities is not limited by spoken language skills. Independent functioning may occasionally include use of self-cueing.   Score Level 7 Level 6 Level 5 Level 4 Level 3 Level 2 Level 1   Modifier [facility] CK CL CM CN     Mental Status:  Alert    Neuro-Linguistics: Answering questions re: short paragraphs read to him: 80% when asked to recall 1 item. When asked to recall 3 items with each paragraph, he was 0% with recalling all 3 items. He recalled 2/3, 65% of the time. Completing short stories with 1 additional sentence: 100%. Cognitive Skills Activities: Activities/Procedures listed utilized to improve and/or restore cognitive function as related to thought organization. Required moderate verbal cueing to improve improve recall of information. __________________________________________________________________________________________________  History of Present Injury/Illness (Reason for Referral):    Treatment Assessment:   . Progression/Medical Necessity:   · Skilled intervention continues to be required due to decreased communication with family/caregivers and decreased cognitive skills. Compliance with Program/Exercises: Will assess as treatment progresses}. Reason for Continuation of Services/Other Comments:  · Patient continues to require skilled intervention due to decreased cognition, anomia. Recommendations/Intent for next treatment session: \"Treatment next visit will focus on cognitive, speech tasks\".      Total Treatment Duration:  Time In: 1015  Time Out: Alice Guerrero, MSP, CCC-SLP

## 2019-09-09 NOTE — PROGRESS NOTES
Alexei Hale  : 1970  Primary: [employer]  Secondary:  [address]  at [facility]  [address], [address], [place], [facility]  [phone]   [phone]        OUTPATIENT PHYSICAL THERAPY: Daily Treatment Note 2019    ICD-10: Treatment Diagnosis: Unsteadiness on feet (R26.81)  Other lack of coordination (R27.8)  Repeated falls (R29.6)    Pre-treatment Symptoms/Complaints:  Patient   Pain: Initial:   7/10. Post Session:  Some better, but no number rating   Medications Last Reviewed:  2019  Updated Objective Findings:  None Today  TREATMENT:     THERAPEUTIC ACTIVITY: ( 30 minutes): Therapeutic activities per grid below to improve mobility, strength and coordination. Date:  19 Date  19 Date:  19 Date:  19   Activity/Exercise    Parameters   Rhythmic facilitation to decrease tone        Transfers - to and from mat     Stand pivot with rolling walker, min A   Bed mobility     Moderate assist rolling and supine to sit.   Min A sit to supine   Sit to  bars and with rolling walker  In bars and with rolling walker with gait belt and CGA In bars and with rolling walker with gait belt and CGA    Static standing 2 x 60 sec in ll bars  2 x 60 sec with ll bars  In bars 4 x 30' trying to focus eyes on bathroom sign   Standing marching 2 x 10 B       Ambulation  2 x 60 feet with weighted rolling walker and 2 person assist with wheelchair follow   Min A to mod A In parallel bars with hand hold and therapist in front with min to mod assist and chair rolling behind x 2 length of bars  Then:    3 x 40 feet  with weighted rolling walker and 2 person assist with wheelchair follow   Min A to mod    In parallel bars with hands on rails and therapist assist with chair following behind him x 1          1 x 45 ft  1 x 60 ft with weighted walker and 2 person assist with wheelchair follow min to mod A  With rolling walker 3 x 20', patient with increased tremors noted during ambulation at the end of the session. Standing in ll bars - tapping foot forward and back  X 10  B with assist of 1 X 10  B with assist of 1 X 10 B  With assist of 1 Attempted but patient very unsteady and fatigued   Standing in ll bars - tapping foot to side and back  X 10 B with assist of 1    Then forward, out to side back to midline and then back to stance   X  2 x 5 reps B with assist of 1    Then forward, out to side back to midline and then back to stance     With rest between  X 10 B with assist of 1                  Standing weight shifts           THERAPEUTIC EXERCISE: ( 10 minutes):  Exercises per grid below to improve mobility, strength and coordination. Date:  8/26/19 Date:  8-28-19 Date  9-6-19 Date:  9/11/19   Activity/Exercise Parameters   Paramters   Single knee to chest        Hamstring stretch   Seated stretch   2 x 30 sec B  Seated stretch 2 x 30 sec hold  Seated 2 x 30 sec B   Lower trunk rotation        Long arc Quads   X 5 B X 5 reps B   Side lying - hip abduction        Side lying - R hip flexor stretch     attempted   Seated at edge of mat        Sit to stand        HS curls       Ankle pumps       Manual stretch to neck flexors 3 x 30 sec- gentle due to increased pain      Cervical chin tuck isometrics X 8 reps with 3 sec hold 5 x 3 sec hold      Cervical ROM Through very small ROM due to pain Through small ROM   X 5         [facility]  Treatment/Session Summary:  Patient had increased tremors and difficulty with movements after balance with eyes focus on a stationary object 4 x 30 seconds. Poor ability to complete activities after this. He continues to benefit from skilled PT to address deficits. See progress note from today. · Response to Treatment:  no adverse reactions.   · Communication/Consultation:  None today   · Equipment provided today:  None today  · Recommendations/Intent for next treatment session: Next visit will focus on improving functional mobility and balance.       Total Treatment Billable Duration:  40 minutes   PT Patient Time In/Time Out  Time In: 0845  Time Out: [address], DPT    Future Appointments   Date Time Provider Sara Abdi   9/11/2019  8:45 AM [facility]   9/11/2019  9:30 AM Abelino Arndt DPT [facility]   9/11/2019 10:15 AM JADA Ernst, OTR/L [facility] [place]

## 2019-09-09 NOTE — PROGRESS NOTES
Penny Bang  : 1970  Primary: [employer]  Secondary:  [address]  at [facility]  [address], [address], [place], [facility]  [phone]   [phone]         OUTPATIENT PHYSICAL THERAPY:Progress Report 2019    ICD-10: Treatment Diagnosis: Unsteadiness on feet (R26.81)  Other lack of coordination (R27.8)  Repeated falls (R29.6)  Precautions/Allergies:   Patient has no allergy information on record. none per wife report  TREATMENT PLAN:  Effective Dates: 2019 TO 10/29/2019 (90 days). Frequency/Duration: 2 times a week for 90 Day(s) MEDICAL/REFERRING DIAGNOSIS:  TBI (traumatic brain injury) [facility]) [S06.9X9A]   DATE OF ONSET: 2016  REFERRING PHYSICIAN:  Yomi Gregg MD Orders: evaluate and treat   RETURN PHYSICIAN APPOINTMENT: unknown      ASSESSMENT (Date: 19):  Mr. Angel Brooks has been seen in physical therapy for 11 visits since 19. He has currently met 2/3 of his short term goals and 0/5 of his long term goals. He has improved his transfers and bed mobility requiring less assistance now. He still has a lot of tremoring and this is his biggest limitation to improving his functional mobility. None of our tone reducing and tremor reducing techniques have helped to this point. He continues to benefit from skilled PT to improve his functional mobility and decrease his assistance needed from his wife for ADLs. PROBLEM LIST (Impacting functional limitations):  1. Decreased Strength  2. Decreased ADL/Functional Activities  3. Decreased Transfer Abilities  4. Decreased Ambulation Ability/Technique  5. Decreased Balance  6. Decreased Activity Tolerance  7. Decreased [place] with Home Exercise Program INTERVENTIONS PLANNED:  1. Balance Exercise  2. Bed Mobility  3. Family Education  4. Gait Training  5. Home Exercise Program (HEP)  6. Neuromuscular Re-education/Strengthening  7.  Therapeutic Activites  8. Therapeutic Exercise/Strengthening  9. Transfer Training     GOALS: (Goals have been discussed and agreed upon with patient.)  Short-Term Functional Goals: Time Frame: 45 days  1. Patient will be compliant with HEP. MET  2. Patient will have an increase on the AMPAC to 14/24 in order to improve functional mobility. MET  3. Patient will demonstrate a stand pivot transfer with supervision in order to improve home mobility. NOT MET  Discharge Goals: Time Frame: 90 days  1. Patient will be independent with HEP. NOT MET  2. Patient will have an increase on the AMPAC to 18/24 in order to improve functional mobility. NOT MET  3. Patient will demonstrate ambulating 48' with the LRAD and supervision in order to improve ability to complete ADLs. NOT MET  4. Patient will demonstrate a car transfer with supervision in order to decrease assistance needed from wife. NOT MET  5. Patient will perform sit to supine with modified independence in order to improve bed mobility. NOT MET    Outcome Measure:             [address] [place] [address] AM-PAC 6 Clicks         Basic Mobility Inpatient Short Form  How much difficulty does the patient currently have. .. Unable A Lot A Little None   1. Turning over in bed (including adjusting bedclothes, sheets and blankets)? [] 1   [x] 2   [] 3   [] 4   2. Sitting down on and standing up from a chair with arms ( e.g., wheelchair, bedside commode, etc.)   [] 1   [] 2   [x] 3   [] 4   3. Moving from lying on back to sitting on the side of the bed? [] 1   [x] 2   [] 3   [] 4          How much help from another person does the patient currently need. .. Total A Lot A Little None   4. Moving to and from a bed to a chair (including a wheelchair)? [] 1   [] 2   [x] 3   [] 4   5. Need to walk in hospital room? [] 1   [x] 2   [] 3   [] 4   6. Climbing 3-5 steps with a railing? [] 1   [x] 2   [] 3   [] 4   © 2007, Trustees of [address] [place] [id number], under license to [employer].  All rights reserved     Score:  Initial: 11 Most Recent: 14 (Date: 9/11/19 )   Interpretation of Tool:  Represents activities that are increasingly more difficult (i.e. Bed mobility, Transfers, Gait). UPDATED OBJECTIVE FINDINGS:       Mental Status:          Confused and Lethargic  Palpation:          increased tone R UE and LE. Coordination:       impaired LUE, impaired LLE, impaired RUE and impaired RLE  Balance:          decreased static balance, decreased dynamic balance and fair sitting and  Poor standing balance    Lower Extremity:    Strength PROM   Action R L R  L    Hip Flexion 3- 3     Hip Extension NT NT     Hip Abduction unable NT     Hip Adduction NT NT     Knee Flexion unable 3     Knee Extension 3- 3     Dorsi Flexion unable 3-     Plantar Flexion       Inversion       Eversion                 Functional Mobility:         Gait/Ambulation:  Ambulates with rolling walker, forward flexed trunk, full body tremors, decreased step length, decreased gait speed, 40' min to mod A with 2 person assist for safety           Transfers:  Min A x 1 person, stand pivot transfer with rolling walker. Very unsteady        Bed Mobility:  Mod A, full body tremors throughout         Stairs:  NT        Wheelchair:  dependent     Ambulatory/Rehab Services H2 Model Falls Risk Assessment    Risk Factors:       (4)  Confusion/Disorientation/Impulsivity       (2)  Symptomatic Depression       (1)  Gender [Male]       (2)  Any administered antiepileptics/anticonvulsants       (5)  History of Recent Falls [w/in 3 months] Ability to Rise from Chair:       (4)  Unable to rise without assistance    Falls Prevention Plan:       Physical Limitations to Exercise (specify):  using a wheelchair       Mobility Assistance Device (specify):  wheelchair and walker   Total: (5 or greater = High Risk): 18    ©2010 [facility] of [facility] [employer]iral[employer] [facility] Patent #[id number].  Federal Law prohibits the replication, distribution or use without written permission from [facility] of [employer] Necessity:   · Patient is expected to demonstrate progress in strength, range of motion, balance and functional technique to increase independence with ADLs. · Patient demonstrates fair rehab potential due to higher previous functional level. Reason for Services/Other Comments:  · Patient continues to require modification of therapeutic interventions to increase complexity of exercises. Total Duration:  PT Patient Time In/Time Out  Time In: 0845  Time Out: 4574        Rehabilitation Potential For Stated Goals: [address] therapy, I certify that the treatment plan above will be carried out by a therapist or under their direction.   Thank you for this referral,  Dahiana Bray DPT    Referring Physician Signature: Dr. Fang Arnold

## 2019-09-11 ENCOUNTER — HOSPITAL ENCOUNTER (OUTPATIENT)
Dept: PHYSICAL THERAPY | Age: 49
Discharge: HOME | End: 2019-09-11
Payer: COMMERCIAL

## 2019-09-11 PROCEDURE — 97535 SELF CARE MNGMENT TRAINING: CPT

## 2019-09-11 PROCEDURE — 97530 THERAPEUTIC ACTIVITIES: CPT

## 2019-09-11 PROCEDURE — 92507 TX SP LANG VOICE COMM INDIV: CPT

## 2019-09-11 PROCEDURE — 97110 THERAPEUTIC EXERCISES: CPT

## 2019-09-11 NOTE — PROGRESS NOTES
Braeden Telles  : 1970  Primary: [employer]  Secondary:  [address] at [facility]  [address] [facility], [address] [place] [address], [place], [facility]  [phone]   [phone]      OUTPATIENT OCCUPATIONAL THERAPY: Daily Treatment Note 2019  Visit Count:  29    ICD-10: Treatment Diagnosis:  Unspecified lack of coordination (R27.9); Dependence on wheelchair (Z99.3); History of falling (Z91.81)  Precautions/Allergies:   Patient has no allergy information on record. TREATMENT PLAN:  Effective Dates: 2019 TO 11/3/2019 (90 days). Frequency/Duration: 2 times a week for 90 Day(s)    Pre-treatment Symptoms/Complaints: Zohra Gonzalez present for session. Patient states his neck/back hurt. Patient's wife states someone contacted them about returning the ramp and manual w/c. Pain: Initial: Pain Intensity 1: (elevated - did not rate)  Post Session:  same/10   Medications Last Reviewed:  2019   Updated Objective Findings:  None Today   TREATMENT:     Therapeutic Activity: (    0 minutes): Therapeutic activities including Bed transfers, Chair transfers and w/c transfers to improve mobility, strength, balance and coordination. Required moderate   to promote static and dynamic balance in sitting and promote coordination of bilateral, upper extremity(s), trunk. Patient sat edge of mat ~5 minutes. Patient assisted to and from the w/c to mat/mat to w/c. He pivoted to the R to transfer to the L due to shaking in his leg. Patient also tried coordination tasks with hIs LUE with difficulty due to shaking - digit abduction and thumb to finger opposition. FUNCTIONAL MOBILITY:   Transfers:   Wheelchair to [employer]: Stand pivot transfer to the R with minimal assistance. Mat to wheelchair. Stand pivot transfer to the L with minimal assistance. Sit to Stand: Minimal assistance.   Self Care: (23 minutes): Procedure(s) (per grid) utilized to improve and/or restore self-care/home management as related to self feeding. Required moderate visual, verbal, manual and tactile cueing to facilitate activities of daily living skills and compensatory activities. Patient demonstrated significant, but less \"shaking\" of his R UE/R LE. He practiced 3-5 buttons with shirt on and off. ADLs from ADL Navigator:  Grooming/Bathing/Dressing Activities of Daily Living                     Upper Body Dressing Assistance  Front Opened Shirt: Stand-by assistance(to don/doff shirt and to button/unbutton shirt)  Cues: Verbal cues provided, Tactile cues provided, Visual cues provided  Adaptive Equipment: Button hook             [employer] Portal  Treatment/Session Summary:  Patient was able to don/doff shirt and button/unbutton shirt with standby assistance. Button hook helped. Plan to order adaptive equipment for patient. Patient's wife/patient encouraged to ask for an order from provider (doctor) for a power w/c. Continue OT per plan of care. · Response to Treatment:  tolerated without complications. · Communication/Consultation:  None today  · Equipment provided today:  given long handled sponge last visit. · Recommendations/Intent for next treatment session: Next visit will focus on advancement to more challenging activities. .    Total Treatment Billable Duration:  23 minutes  OT Patient Time In/Time Out  Time In: 1030  Time Out: Radha Cummings [address] Jaycee Barber OTR/L    Future Appointments   Date Time Provider Sara Abdi   9/16/2019  8:00 AM Opal Das, SLP [facility]   9/25/2019  8:00 AM JADA Arnold, OTR/L [facility] [place]

## 2019-09-11 NOTE — PROGRESS NOTES
OT Note (9/11/19):  7794 This OT spoke with Mary Bruno RN case manager for Mr. Em Combs. Mary Bruno states this OT should speak with [address] [place] [address] representative regarding getting a power w/c covered through [facility]. 0900 Voice message left for Trent Garcia RN to call this therapist back.

## 2019-09-11 NOTE — PROGRESS NOTES
Alexei Hale  : 1970  Primary: [employer]  Secondary:  [address] at [facility], [address], [place], [facility]  [phone]   [phone]        OUTPATIENT SPEECH LANGUAGE PATHOLOGY: Daily Note: 3    ICD-10: Treatment Diagnosis: cognitive communication deficits R 41.841  REFERRING PHYSICIAN: Taye Melchor MD MD Orders: speech evaluate and treat  Return Physician Appointment:    PAST MEDICAL HISTORY: TBI, sleep apnea  MEDICAL/REFERRING DIAGNOSIS: TBI (traumatic brain injury) ([facility] [address] [S06.9X9A]  DATE OF ONSET:   PRIOR LEVEL OF FUNCTION: with spouse and children   PRECAUTIONS/ALLERGIES: NKDA   ASSESSMENT:  Pt with mod difficulties with mental math tasks this date. He reported he was good in math at baseline. Per the interpretor, when the pt repeated the numbers he repeated them correctly. However, he exhibited difficulties with calculating the answers. He only missed 1 problem due to performing the wrong task (added vs subtracted). Usually it was a calculation error. Patient will benefit from skilled intervention to address the above impairments. ?????? ? ? This section established at most recent assessment??????????  PROBLEM LIST (Impairments causing functional limitations):  1. Decreased cognition  2. Anomia   GOALS: (Goals have been discussed and agreed upon with patient.)  SHORT-TERM FUNCTIONAL GOALS: Time Frame: 3 months   1. Pt will name pictures with 80% accuracy. 2. Pt will complete word finding tasks with 80% accuracy. 3. Pt will complete convergent/divergent naming tasks with 80% accuracy. 4. Pt will state his address with only min cues needed. 5. Pt will state his phone number with only min cues needed. 6. Pt will state his childrens' names with only min cues needed. 7. Pt will complete immediate memory tasks with 80% accuracy. 8. Pt will complete STM tasks with 80% accuracy.     9. Pt will participate in a full cognitive assessment x1. DISCHARGE GOALS: Time Frame: 4-5 months   1. Increased memory and expressive language skills needed for highest level of independent functioning. REHABILITATION POTENTIAL FOR STATED GOALS: FairPLAN OF CARE:  INTERVENTIONS PLANNED: (Benefits and precautions of therapy have been discussed with the patient.)  1. Cognitive tasks  2. Speech tasks  TREATMENT PLAN EFFECTIVE DATES: 7/31/2019 TO 10/30/2019 (90 days). FREQUENCY/DURATION: Continue to follow patient 2 times a week for 90 days to address above goals. Regarding [address] therapy, I certify that the treatment plan above will be carried out by a therapist or under their direction. Thank you for this referral,  [facility], [facility] MEDICO [facility], [facility] GABRIEL PRAFUL DING, [facility]-SLP                     Referring Physician Signature: Alesia Cantrell MD     Date      SUBJECTIVE:  Pt's spouse reported transportation arrived late again today to pick the pt up. Therefore, pt was late to therapy this date. Present Symptoms: decreased cognition s/p TBI in 2016      Current Medications: see hard chart    Date Last Reviewed: 8/21/19  Social History/Home Situation: residing with spouse and their 5 children      Work/Activity History:  for [employer]     OBJECTIVE:  Objective Measure: Tool Used: National Outcomes Measurement System: Functional Communication Measures: SPOKEN LANGUAGE EXPRESSION  Score:  Initial: 5 Most Recent: 5 (Date: 8/21/19 )   Interpretation of Tool: This measure describes the change in functional communication status subsequent to speech-language pathology treatment of patients who have spoken language expression deficits. o Level 1:  The individual attempts to speak, but verbalizations are not meaningful to familiar or unfamiliar communication partners at any time. o Level 2:  The individual attempts to speak, although few attempts are accurate or appropriate.   The communication partner must assume responsibility for structuring the communication exchange, and with consistent and maximal cueing, the individual can only occasionally produce automatic and/or imitative words and phrases that are rarely meaningful in context.  o Level 3:  The communication partner must assume responsibility for structuring the communication exchange, and with consistent and moderate cueing, the individual can produce words and phrases that are appropriate and meaningful in context.  o Level 4:  The individual is successfully able to initiate communication using spoken language in simple, structured conversations in routine daily activities with familiar communication partners. The individual usually requires moderate cueing, but is able to demonstrate use of simple sentences (i.e., semantics, syntax, and morphology) and rarely uses complex sentences/messages. o Level 5:  The individual is successfully able to initiate communication using spoken language in structured conversations with both familiar and unfamiliar communication partners. The individual occasionally requires minimal cueing to frame more complex sentences in messages. The individual occasionally self-cues when encountering difficulty. o Level 6:  The individual is successfully able to communicate in most activities, but some limitations in spoken language are still apparent in vocational, avocational, and social activities. The individual rarely requires minimal cueing to frame complex sentences. The individual usually self-cues when encountering difficulty. o Level 7: The individuals ability to successfully and independently participate in vocational, avocational, and social activities is not limited by spoken language skills. Independent functioning may occasionally include use of self-cueing.   Score Level 7 Level 6 Level 5 Level 4 Level 3 Level 2 Level 1   Modifier CH CI CJ CK CL CM CN     Mental Status:  Alert    Neuro-Linguistics:  Adding 2 single numbers presented orally: 60%  Subtracting 2 single numbers presented orally: 50%. Mixed addition/subtraction: 60%. Identifying largest number given 3 numbers orally: 50%. Repeating 4 numbers: 50%. Stating which of them was the largest: 70%. Cognitive Skills Activities: Activities/Procedures listed utilized to improve and/or restore cognitive function as related to thought organization. Required moderate verbal cueing to improve improve recall of information. __________________________________________________________________________________________________  History of Present Injury/Illness (Reason for Referral):    Treatment Assessment:   . Progression/Medical Necessity:   · Skilled intervention continues to be required due to decreased communication with family/caregivers and decreased cognitive skills. Compliance with Program/Exercises: Will assess as treatment progresses}. Reason for Continuation of Services/Other Comments:  · Patient continues to require skilled intervention due to decreased cognition, anomia. Recommendations/Intent for next treatment session: \"Treatment next visit will focus on cognitive, speech tasks\".      Total Treatment Duration:  Time In: 4709  Time Out: [address] [facility], [facility] MEDICO [employer], [facility] GABRIEL DING, CCC-SLP

## 2019-09-11 NOTE — PROGRESS NOTES
Finn Villar  : 1970  Primary: [employer]  Secondary:  [address]  at [facility]  [address], [address], [place], [facility]  [phone]   [phone]        OUTPATIENT PHYSICAL THERAPY: Daily Treatment Note 2019    ICD-10: Treatment Diagnosis: Unsteadiness on feet (R26.81)  Other lack of coordination (R27.8)  Repeated falls (R29.6)    Pre-treatment Symptoms/Complaints:  Patient reports his legs are numb today. Says when he stands he gets numbness that travels from his neck down. Pain: Initial:   7/10  Post Session:  Some better, but no number rating   Medications Last Reviewed:  2019  Updated Objective Findings:  None Today  TREATMENT:     THERAPEUTIC ACTIVITY: ( 15 minutes): Therapeutic activities per grid below to improve mobility, strength and coordination. Date  19 Date:  19 Date:  19 Date:  19   Activity/Exercise   Parameters Parameters    Rhythmic facilitation to decrease tone        Transfers - to and from mat    Stand pivot with rolling walker, min A    Bed mobility    Moderate assist rolling and supine to sit. Min A sit to supine    Sit to  bars and with rolling walker with gait belt and CGA In bars and with rolling walker with gait belt and CGA  In parallel bars. 4 x 30 seconds- has to sit because of increased numbness. Knees buckled on the first rep and he required 2 person assistance to get him back to the chair. No fall, therapist held him by the gait belt.     Static standing  2 x 60 sec with ll bars  In bars 4 x 30' trying to focus eyes on bathroom sign    Standing marching       Ambulation  In parallel bars with hand hold and therapist in front with min to mod assist and chair rolling behind x 2 length of bars  Then:    3 x 40 feet  with weighted rolling walker and 2 person assist with wheelchair follow   Min A to mod    In parallel bars with hands on rails and therapist assist with chair following behind him x 1          1 x 45 ft  1 x 60 ft with weighted walker and 2 person assist with wheelchair follow min to mod A  With rolling walker 3 x 20', patient with increased tremors noted during ambulation at the end of the session. Not today due to increased numbness and decreased control   Standing in ll bars - tapping foot forward and back  X 10  B with assist of 1 X 10 B  With assist of 1 Attempted but patient very unsteady and fatigued    Standing in ll bars - tapping foot to side and back  2 x 5 reps B with assist of 1    Then forward, out to side back to midline and then back to stance     With rest between  X 10 B with assist of 1                   Standing weight shifts           THERAPEUTIC EXERCISE: ( 25 minutes):  Exercises per grid below to improve mobility, strength and coordination. Date:  8/26/19 Date:  8-28-19 Date  9-6-19 Date:  9/11/19   Activity/Exercise Parameters   Paramters   Single knee to chest        Hamstring stretch   Seated stretch   2 x 30 sec B  Seated stretch 2 x 30 sec hold  Seated 3 x 30 sec B   Lower trunk rotation        Long arc Quads   X 5 B 2 X 5 reps B active assist   Side lying - hip abduction        Side lying - R hip flexor stretch        Seated at edge of mat        Sit to stand        calft stretch    2 x 30 sec B   Ankle pumps    X 10 reps B active assist   Manual stretch to neck flexors 3 x 30 sec- gentle due to increased pain      Cervical chin tuck isometrics X 8 reps with 3 sec hold 5 x 3 sec hold      Cervical ROM Through very small ROM due to pain Through small ROM   X 5         Med[employer] Portal  Treatment/Session Summary:  Patient had increased difficulty today with less control and more weakness. He states he had more numbness today,  Knees buckled during standing and he required max A x 2 to return to the chair. Didn't ambulate today due to decreased control and safety. He continues to benefit from skilled PT to improve functional mobility. · Response to Treatment:  no adverse reactions. · Communication/Consultation:  None today   · Equipment provided today:  None today  · Recommendations/Intent for next treatment session: Next visit will focus on improving functional mobility and balance.       Total Treatment Billable Duration:  40 minutes   PT Patient Time In/Time Out  Time In: 0930  Time Out: [address], DPT    Future Appointments   Date Time Provider Sara Abdi   9/16/2019  8:00 AM Dajuan Das, SLP [facility] SFD   9/25/2019  8:00 AM JADA Spangler, OTR/L [facility] Efraín Chaves

## 2019-09-11 NOTE — PROGRESS NOTES
OT Note (9/11/19):  5914 This OT spoke with Fabrice Ladd RN with [address] representative regarding getting a power w/c covered through [facility]. Viet Hoang informed this therapist that patient does not have authorization for a power w/c because the provider (Doctor) did not order one. This therapist stated he will tell the patient/patient's wife to ask for an order for a power w/c. This therapist also stated to Viet Hoang that the patient's wife informed him that someone contacted them about returning the ramp and manual w/c. Viet Hoang states she will talk to Preet Kumar about this.

## 2019-09-16 ENCOUNTER — APPOINTMENT (OUTPATIENT)
Dept: PHYSICAL THERAPY | Age: 49
End: 2019-09-16
Payer: COMMERCIAL

## 2019-09-16 ENCOUNTER — HOSPITAL ENCOUNTER (OUTPATIENT)
Dept: PHYSICAL THERAPY | Age: 49
Discharge: HOME | End: 2019-09-16
Payer: COMMERCIAL

## 2019-09-16 PROCEDURE — 92507 TX SP LANG VOICE COMM INDIV: CPT

## 2019-09-16 NOTE — PROGRESS NOTES
Franki Ward  : 1970  Primary: [employer]  Secondary:  [address]  at [facility]  [address], [address], [place] [address] [facility]  [phone]   [phone]        OUTPATIENT SPEECH LANGUAGE PATHOLOGY: Daily Note: 4    ICD-10: Treatment Diagnosis: cognitive communication deficits R 41.841  REFERRING PHYSICIAN: Drake Chan MD MD Orders: speech evaluate and treat  Return Physician Appointment:    PAST MEDICAL HISTORY: TBI, sleep apnea  MEDICAL/REFERRING DIAGNOSIS: TBI (traumatic brain injury) ([facility] [address] [S06.9X9A]  DATE OF ONSET:   PRIOR LEVEL OF FUNCTION: with spouse and children   PRECAUTIONS/ALLERGIES: NKDA   ASSESSMENT:  Pt stated 4/5 of his childrens' names that live with them this date. He was able to state the last child with 2nd attempt. He stated the names of all 3 that live in [facility] without difficulty. Mild difficulties with repeating sentences. The sentences were presented twice for increased recall. Patient will benefit from skilled intervention to address the above impairments. ?????? ? ? This section established at most recent assessment??????????  PROBLEM LIST (Impairments causing functional limitations):  1. Decreased cognition  2. Anomia   GOALS: (Goals have been discussed and agreed upon with patient.)  SHORT-TERM FUNCTIONAL GOALS: Time Frame: 3 months   1. Pt will name pictures with 80% accuracy. 2. Pt will complete word finding tasks with 80% accuracy. 3. Pt will complete convergent/divergent naming tasks with 80% accuracy. 4. Pt will state his address with only min cues needed. 5. Pt will state his phone number with only min cues needed. 6. Pt will state his childrens' names with only min cues needed. 7. Pt will complete immediate memory tasks with 80% accuracy. 8. Pt will complete STM tasks with 80% accuracy. 9. Pt will participate in a full cognitive assessment x1.    DISCHARGE GOALS: Time Frame: 4-5 months 1. Increased memory and expressive language skills needed for highest level of independent functioning. REHABILITATION POTENTIAL FOR STATED GOALS: FairPLAN OF CARE:  INTERVENTIONS PLANNED: (Benefits and precautions of therapy have been discussed with the patient.)  1. Cognitive tasks  2. Speech tasks  TREATMENT PLAN EFFECTIVE DATES: 7/31/2019 TO 10/30/2019 (90 days). FREQUENCY/DURATION: Continue to follow patient 2 times a week for 90 days to address above goals. Regarding [address] [place] [address] Road therapy, I certify that the treatment plan above will be carried out by a therapist or under their direction. Thank you for this referral,  Claudetta Friar, [address] [address] [facility]                     Referring Physician Signature: Ben Lee MD     Date      SUBJECTIVE:  Pt reported he was feeling a little dizzy this date. Pt's spouse and  present. Present Symptoms: decreased cognition s/p TBI in 2016      Current Medications: see hard chart    Date Last Reviewed: 9/16/19  Social History/Home Situation: residing with spouse and their 5 children      Work/Activity History:  for [employer]     OBJECTIVE:  Objective Measure: Tool Used: National Outcomes Measurement System: Functional Communication Measures: SPOKEN LANGUAGE EXPRESSION  Score:  Initial: 5 Most Recent: 5 (Date: 8/21/19 )   Interpretation of Tool: This measure describes the change in functional communication status subsequent to speech-language pathology treatment of patients who have spoken language expression deficits. o Level 1:  The individual attempts to speak, but verbalizations are not meaningful to familiar or unfamiliar communication partners at any time. o Level 2:  The individual attempts to speak, although few attempts are accurate or appropriate.   The communication partner must assume responsibility for structuring the communication exchange, and with consistent and maximal cueing, the individual can only occasionally produce automatic and/or imitative words and phrases that are rarely meaningful in context.  o Level 3:  The communication partner must assume responsibility for structuring the communication exchange, and with consistent and moderate cueing, the individual can produce words and phrases that are appropriate and meaningful in context.  o Level 4:  The individual is successfully able to initiate communication using spoken language in simple, structured conversations in routine daily activities with familiar communication partners. The individual usually requires moderate cueing, but is able to demonstrate use of simple sentences (i.e., semantics, syntax, and morphology) and rarely uses complex sentences/messages. o Level 5:  The individual is successfully able to initiate communication using spoken language in structured conversations with both familiar and unfamiliar communication partners. The individual occasionally requires minimal cueing to frame more complex sentences in messages. The individual occasionally self-cues when encountering difficulty. o Level 6:  The individual is successfully able to communicate in most activities, but some limitations in spoken language are still apparent in vocational, avocational, and social activities. The individual rarely requires minimal cueing to frame complex sentences. The individual usually self-cues when encountering difficulty. o Level 7: The individuals ability to successfully and independently participate in vocational, avocational, and social activities is not limited by spoken language skills. Independent functioning may occasionally include use of self-cueing. Score Level 7 Level 6 Level 5 Level 4 Level 3 Level 2 Level 1   Modifier CH CI CJ CK CL CM CN     Mental Status:  Alert    Neuro-Linguistics:  Stating his phone number: said he didn't know it. Repeated 9/10 numbers correctly after SLP. Stating his childrens' names: stated 8/9 children. Stated all of them with a 2nd attempt. General information questions: 75%; 70%. Repeating 5 word sentences: 70%. Cognitive Skills Activities: Activities/Procedures listed utilized to improve and/or restore cognitive function as related to thought organization. Required moderate verbal cueing to improve improve recall of information. __________________________________________________________________________________________________  History of Present Injury/Illness (Reason for Referral):    Treatment Assessment:   . Progression/Medical Necessity:   · Skilled intervention continues to be required due to decreased communication with family/caregivers and decreased cognitive skills. Compliance with Program/Exercises: Will assess as treatment progresses}. Reason for Continuation of Services/Other Comments:  · Patient continues to require skilled intervention due to decreased cognition, anomia. Recommendations/Intent for next treatment session: \"Treatment next visit will focus on cognitive, speech tasks\".      Total Treatment Duration:  Time In: 0805  Time Out: [facility] 43., CCC-SLP

## 2019-09-25 ENCOUNTER — HOSPITAL ENCOUNTER (OUTPATIENT)
Dept: PHYSICAL THERAPY | Age: 49
Discharge: HOME | End: 2019-09-25
Payer: COMMERCIAL

## 2019-09-25 PROCEDURE — 97530 THERAPEUTIC ACTIVITIES: CPT

## 2019-09-25 PROCEDURE — 97535 SELF CARE MNGMENT TRAINING: CPT

## 2019-09-25 NOTE — PROGRESS NOTES
Michael Easton  : 1970  Primary: [employer]  Secondary:  [address] at [facility]  [address], [address], [place], [facility]  [phone]   [phone]      OUTPATIENT OCCUPATIONAL THERAPY: Daily Treatment Note 2019  Visit Count:  31    ICD-10: Treatment Diagnosis:  Unspecified lack of coordination (R27.9); Dependence on wheelchair (Z99.3); History of falling (Z91.81)  Precautions/Allergies:   Patient has no allergy information on record. TREATMENT PLAN:  Effective Dates: 2019 TO 11/3/2019 (90 days). Frequency/Duration: 2 times a week for 90 Day(s)    Pre-treatment Symptoms/Complaints: Patient's wife states they need to see the doctor (physiatrist) before they can ask about the power wheelchair. She states they see them on . Susan Sánchez and Oz Luo present for session. Patient states his neck/back hurt. Patient's wife states their home aides have not been helpful. They send different ones all the time - one aide sleeps and another is \"drunk. \"  Patient's wife states their counselor told them that driving the power w/c is good for him. Pain: Initial: Pain Intensity 1: 7  Pain Location 1: Neck, Back  Post Session:  same/10   Medications Last Reviewed:  2019   Updated Objective Findings:  See evaluation note from today   TREATMENT:     Therapeutic Activity: (    20 minutes): Therapeutic activities including Bed transfers, Chair transfers and w/c transfers to improve mobility, strength, balance and coordination. Required moderate   to promote static and dynamic balance in sitting and promote coordination of bilateral, upper extremity(s), trunk. Patient assisted to and from the w/c to mat/mat to w/c. He pivoted to the R to transfer to the L due to shaking in his leg.   Patient also tried coordination tasks with hIs LUE with difficulty due to shaking - thumb to finger opposition - able to complete on the R, but not on L. FUNCTIONAL MOBILITY:   Transfers:   Wheelchair to [employer]: Stand pivot transfer to the R with minimal assistance. Mat to wheelchair. Stand pivot transfer to the L with minimal assistance. Sit to Stand: Minimal assistance. Self Care: (55 minutes): Procedure(s) (per grid) utilized to improve and/or restore self-care/home management as related to self feeding. Required moderate visual, verbal, manual and tactile cueing to facilitate activities of daily living skills and compensatory activities. Patient demonstrated significant, but less \"shaking\" of his R UE/R LE. He practiced 3-5 buttons with shirt on and off.   ADLs from ADL Navigator:  Grooming/Bathing/Dressing Activities of Daily Living           Feeding  Drink to Mouth: Maximum assistance  Cues: Physical assistance(due to excessive B UE shaking.)  Adaptive Equipment: (trialed \"nosey\" cup.)         Upper Body Dressing Assistance  Front Opened Shirt: Stand-by assistance, Compensatory technique training(also to button/unbutton shirt)  Cues: Verbal cues provided  Adaptive Equipment: Button hook, Dressing stick     Lower Body Dressing Assistance  Pants With Elastic Waist: Stand-by assistance, Compensatory technique training  Socks: Stand-by assistance, Compensatory technique training  Shoes with Elastic Laces: Minimum assistance, Compensatory technique training(to pull R \"tongue\" )  Leg Crossed Method Used: No  Position Performed: Seated edge of bed(edge of mat)  Cues: Mason Omalley  Adaptive Equipment Used: Long handled shoe horn, Dressing stick, Reacher, Sock aid Bed/Mat Mobility  Sit to Stand: Minimum assistance  Stand to Sit: Minimum assistance  Bed to Chair: Minimum assistance  Scooting: Supervision  Cues: Verbal cues provided     [employer]  Treatment/Session Summary:  Patient was able to don/doff shirt and button/unbutton shirt using a button hook and dressing stick (using the hook in the button hole to bring behind the back to the other side) with standby assistance. He had new shoes with elastic laces and was able to don/doff shoes except for needing help to pull the tongue of his R shoe. He currently requires moderate overall assistance for ADL (better than total assistance initially) using adaptive equipment. .  Continue OT per plan of care. · Response to Treatment:  tolerated without complications. · Communication/Consultation:  Progress report sent to MD  · Equipment provided today:  given long handled shoe horn, button hook, reacher, and sock aide  · Recommendations/Intent for next treatment session: Next visit will focus on advancement to more challenging activities. .    Total Treatment Billable Duration:  75 minutes  OT Patient Time In/Time Out  Time In: 0800  Time Out: 0915  GERALD Mustafa/L    Future Appointments   Date Time Provider Sara Abdi   10/2/2019  8:00 AM GERALD Schuler/L [facility]   10/2/2019  8:45 AM Gianni Das SLP [facility] SFD   10/2/2019  9:30 AM Matthew Silverman DPT [facility]FD   10/4/2019  8:45 AM Matthew Silverman DPT [facility]FD   10/4/2019  9:30 AM Theadore Fitting, OTD, OTR/L [facility]   10/9/2019  8:45 AM Theadore Fitting, OTD, OTR/L [facility]   10/9/2019  9:30 AM Darron Ordonez PTA [facility]   10/9/2019 10:15 AM Gianni Das SLP [facility]   10/11/2019  8:00 AM Matthew Silverman DPT [facility]   10/11/2019  8:45 AM Theadore Fitting, OTD, OTR/L [facility]   10/11/2019  9:30 AM Gianni Das SLP [facility]   10/14/2019  8:00 AM Gianni Das SLP [facility]   10/14/2019  8:45 AM Theadore Fitting, OTD, OTR/L [facility]   10/14/2019  9:30 AM Matthew Silverman DPT [facility]   10/18/2019  8:45 AM Gianni Das, SLP [facility]   10/18/2019  9:30 AM Theadore Fitting, OTD, OTR/L [facility]   10/18/2019 10:15 AM Matthew Silverman DPT [facility]   10/23/2019  8:45 AM Theadore Fitting, OTD, OTR/L [facility]   10/23/2019  9:30 AM Angie Conner, CHRIS [facility] 10/23/2019 10:15 AM Issa Moses PTA [facility] D   10/25/2019  9:30 AM [facility] Whitlock[facility]   10/25/2019 10:15 AM Cherrie Das, SLP [facility] D   10/25/2019 11:00 AM Diane Del Castillo PTA [facility]

## 2019-09-25 NOTE — PROGRESS NOTES
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 Shoulder ABduction: 4-  L Elbow Flexion: 4-  L Elbow Extension: 3+  L Wrist Extension: 4-  L Digital Flexion: 4-  L Digital Extension: 4-  L Digital Adduction: 4-  L : 4- R Shoulder Flexion: 3-(partly limited due to pain)  R Shoulder ABduction: 3-  R Elbow Flexion: 3-  R Elbow Extension: 3-  R Wrist Extension: 3-  R Digital Flexion: 3-  R Digital Extension: 3-  R Digital adduction: 3  R : 4- Finger to thumb oppposition: Able to complete on R, but not on L; however, limited function due to \"shaking. \"                   BALANCE:    Balance  Sitting: Impaired  Sitting - Static: Good (unsupported)  Sitting - Dynamic: Fair (occasional)  Standing: Impaired  Standing - Static: Constant support  Standing - Dynamic : Poor    ADLs from General Assessment:  Basic ADL  Feeding:  Moderate assistance versus Maximum assistance  Oral Facial Hygiene/Grooming: Maximum assistance  Bathing: Maximal assistance versus Total assistance  Upper Body Dressing: Standby assistance versus Maximum assistance  Lower Body Dressing: Minimal assistance versus Total assistance  Toileting: Maximal assistance versus Total assistance    Instrumental ADL  Meal Preparation: Total assistance  Homemaking: Total assistance  Medication Management: Total assistance  Financial Management: Total assistance      Physical Skills Involved:  1. Range of Motion  2. Balance  3. Strength  4. Activity Tolerance  5. Sensation  6. Fine Motor Control  7. Gross Motor Control  8. Vision  9. Pain (acute)  10. Pain (Chronic) Cognitive Skills Affected (resulting in the inability to perform in a timely and safe manner):  1. Perception  2. Executive Function  3. Divided Attention Psychosocial Skills Affected:  1. Habits/Routines  2. Environmental Adaptation  3. Social Interaction  4. Emotional Regulation  5. Social Roles   Number of elements that affect the Plan of Care[de-identified] 5+:  HIGH COMPLEXITY   CLINICAL DECISION MAKING:   Clinical Decision-Making Assessment:  Arabella Eng required moderate to maximal verbal, visual, physical or environmental cues to carry out assessment tasks.    Assessment process, impact of co-morbidities, assessment modification\need for assistance, and selection of interventions: Analytical Complexity:HIGH COMPLEXITY

## 2019-10-01 NOTE — PROGRESS NOTES
Alia Mullins  : 1970  Primary: [employer]  Secondary:  [address]  at [facility]  [address], [place] [address] [address] [facility]  [phone]   [phone]        OUTPATIENT SPEECH LANGUAGE PATHOLOGY: Progress Report    ICD-10: Treatment Diagnosis: cognitive communication deficits R 41.841  REFERRING PHYSICIAN: Jim Leonardo MD MD Orders: speech evaluate and treat  Return Physician Appointment:    PAST MEDICAL HISTORY: TBI, sleep apnea  MEDICAL/REFERRING DIAGNOSIS: TBI (traumatic brain injury) ([facility] [address] [S06.9X9A]  DATE OF ONSET:   PRIOR LEVEL OF FUNCTION: with spouse and children   PRECAUTIONS/ALLERGIES: NKDA   ASSESSMENT:  Pt has attended 13 sessions due to decreased cognition. Per the interpretor the pt is scheduled for another test due to tremors that were observed on the MBS. Informed her and she informed the pt's spouse the MBS report did not reflect tremors but decreased UES opening. Noted a GI referral was recommended on the MBS. His spouse said she doesn't have any information on that. When the pt was asked about his memory he reported it is the same. However, the pt exhibited improvements this date as he was able to state all of his children's names and his phone number. He stated the majority of his address as well. His spouse reported the pt does better with recall with the use of chunking. Discussed that chunking is a good strategy for memory. The pt complained of difficulties with the bright colors on some of the clothing pictures bothering his eyes this date. His spouse reported bright colors and lights do bother the pt. Patient will benefit from skilled intervention to address the above impairments. ?????? ? ? This section established at most recent assessment??????????  PROBLEM LIST (Impairments causing functional limitations):  1. Decreased cognition  2.  Anomia   GOALS: (Goals have been discussed and agreed upon with patient.)  SHORT-TERM FUNCTIONAL GOALS: Time Frame: 3 months   1. Pt will name pictures with 80% accuracy. Goal ongoing. 2. Pt will complete word finding tasks with 80% accuracy. Goal going. 3. Pt will complete convergent/divergent naming tasks with 80% accuracy. Goal ongoing. 4. Pt will state his address with only min cues needed. Goal not met. 5. Pt will state his phone number with only min cues needed. Goal met. 6. Pt will state his childrens' names with only min cues needed. Goal met. 7. Pt will complete immediate memory tasks with 80% accuracy. Goal met. 8. Pt will complete STM tasks with 80% accuracy. Goal not met. 9. Pt will participate in a full cognitive assessment x1. Goal ongoing. DISCHARGE GOALS: Time Frame: 4-5 months   1. Increased memory and expressive language skills needed for highest level of independent functioning. REHABILITATION POTENTIAL FOR STATED GOALS: FairPLAN OF CARE:  INTERVENTIONS PLANNED: (Benefits and precautions of therapy have been discussed with the patient.)  1. Cognitive tasks  2. Speech tasks  TREATMENT PLAN EFFECTIVE DATES: 7/31/2019 TO 10/30/2019 (90 days). FREQUENCY/DURATION: Continue to follow patient 2 times a week for 90 days to address above goals. Regarding [address] therapy, I certify that the treatment plan above will be carried out by a therapist or under their direction. Thank you for this referral,  Carol [address] [address] [facility]                     Referring Physician Signature: Poornima Callahan MD     Date      SUBJECTIVE:  Pt reported he has been dizzy some lately. Present Symptoms: decreased cognition s/p TBI in 2016      Current Medications: see hard chart    Date Last Reviewed: 10/2/19  Social History/Home Situation: residing with spouse and their 5 children      Work/Activity History:  for [employer]     OBJECTIVE:  Objective Measure:   Tool Used: National Outcomes Measurement System: Functional Communication Measures: SPOKEN LANGUAGE EXPRESSION  Score:  Initial: 5 Most Recent: 5 (Date: 10/2/19 )   Interpretation of Tool: This measure describes the change in functional communication status subsequent to speech-language pathology treatment of patients who have spoken language expression deficits. o Level 1:  The individual attempts to speak, but verbalizations are not meaningful to familiar or unfamiliar communication partners at any time. o Level 2:  The individual attempts to speak, although few attempts are accurate or appropriate. The communication partner must assume responsibility for structuring the communication exchange, and with consistent and maximal cueing, the individual can only occasionally produce automatic and/or imitative words and phrases that are rarely meaningful in context.  o Level 3:  The communication partner must assume responsibility for structuring the communication exchange, and with consistent and moderate cueing, the individual can produce words and phrases that are appropriate and meaningful in context.  o Level 4:  The individual is successfully able to initiate communication using spoken language in simple, structured conversations in routine daily activities with familiar communication partners. The individual usually requires moderate cueing, but is able to demonstrate use of simple sentences (i.e., semantics, syntax, and morphology) and rarely uses complex sentences/messages. o Level 5:  The individual is successfully able to initiate communication using spoken language in structured conversations with both familiar and unfamiliar communication partners. The individual occasionally requires minimal cueing to frame more complex sentences in messages. The individual occasionally self-cues when encountering difficulty.   o Level 6:  The individual is successfully able to communicate in most activities, but some limitations in spoken language are still apparent in vocational, avocational, and social activities. The individual rarely requires minimal cueing to frame complex sentences. The individual usually self-cues when encountering difficulty. o Level 7: The individuals ability to successfully and independently participate in vocational, avocational, and social activities is not limited by spoken language skills. Independent functioning may occasionally include use of self-cueing. Score Level 7 Level 6 Level 5 Level 4 Level 3 Level 2 Level 1   Modifier CH CI CJ CK CL CM CN     Mental Status:  Alert    Neuro-Linguistics:  Naming his children: named all 8 children independently (3 in [facility] and 5 here). Stating his address: stated his house number, street name and lot # independently. He omitted \"extension\". He also stated city and state independently. Unable to recall the zip code. Repeated it incorrectly after SLP stated it. Stating his phone number: stated it independently with 100% accuracy. Naming pictures of clothin%. He missed 1 item due to decreased vision and 1 due to anomia per pt report. Repeating 5 word sentences read to him: 70%. Cognitive Skills Activities: Activities/Procedures listed utilized to improve and/or restore cognitive function as related to thought organization. Required moderate verbal cueing to improve improve recall of information. __________________________________________________________________________________________________  History of Present Injury/Illness (Reason for Referral):    Treatment Assessment:   . Progression/Medical Necessity:   · Skilled intervention continues to be required due to decreased communication with family/caregivers and decreased cognitive skills. Compliance with Program/Exercises: Will assess as treatment progresses}. Reason for Continuation of Services/Other Comments:  · Patient continues to require skilled intervention due to decreased cognition, anomia.   Recommendations/Intent for next treatment session: \"Treatment next visit will focus on cognitive, speech tasks\".      Total Treatment Duration:  Time In: 5201  Time Out: [address] [facility], [facility] [address] [facility]

## 2019-10-02 ENCOUNTER — HOSPITAL ENCOUNTER (OUTPATIENT)
Dept: PHYSICAL THERAPY | Age: 49
Discharge: HOME | End: 2019-10-02
Payer: COMMERCIAL

## 2019-10-02 PROCEDURE — 97530 THERAPEUTIC ACTIVITIES: CPT

## 2019-10-02 PROCEDURE — 97110 THERAPEUTIC EXERCISES: CPT

## 2019-10-02 PROCEDURE — 92507 TX SP LANG VOICE COMM INDIV: CPT

## 2019-10-02 NOTE — PROGRESS NOTES
Leila Mukherjee  : 1970  Primary: [employer]  Secondary:  [address]  at [facility]  [address], [place], [facility]  [phone]   [phone]      OUTPATIENT OCCUPATIONAL THERAPY: Daily Treatment Note 10/2/2019  Visit Count:  32    ICD-10: Treatment Diagnosis:  Unspecified lack of coordination (R27.9); Dependence on wheelchair (Z99.3); History of falling (Z91.81)  Precautions/Allergies:   Patient has no allergy information on record. TREATMENT PLAN:  Effective Dates: 2019 TO 11/3/2019 (90 days). Frequency/Duration: 2 times a week for 90 Day(s)    Pre-treatment Symptoms/Complaints: Patient's wife states they have an appointment with a movement specialist on . She states the Dr. Ancelmo Prince him he has Parkinson's? Noted per review of chart (care everywhere) that Neurosurgery (Dr. 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 Patient's wife states she has been checking his blood glucose levels which have been high and he is taking medicine for it - they have discussed with doctor. Patient states he feels like his throat is inflamed and coughs up pus after he drinks water. Patient states he has been dressing himself partially: donning his shoes, socks and shirt. He states he has difficulty with donning his pants due to difficulty standing up. Interpretor Moises Key present for session. Pain: Initial: Pain Intensity 1: (did not rate)  Pain Location 1: Back, Neck  Post Session:  same/10   Medications Last Reviewed:  10/2/2019   Updated Objective Findings:  None Today   TREATMENT:     Therapeutic Activity: (    15 minutes): Therapeutic activities including Bed transfers, Chair transfers and w/c transfers to improve mobility, strength, balance and coordination.   Required moderate   to promote static and dynamic balance in sitting and promote coordination of bilateral, upper extremity(s), trunk. Patient assisted to and from the w/c to mat/mat to w/c. He pivoted to the R to transfer to the L due to shaking in his leg. Patient also attempted to take a drink of water with is LUE, but then required maximal to total assistance to complete. FUNCTIONAL MOBILITY:   Transfers:   Wheelchair to [employer]: Stand pivot transfer to the R with minimal assistance. Mat to wheelchair. Stand pivot transfer to the L with minimal assistance. Sit to Stand: Minimal assistance. Therapeutic Exercise: (  8 minutes):  Exercises per grid below to improve mobility, strength, balance and coordination. Required moderate visual, verbal, manual and tactile cues to promote proper body alignment, promote proper body posture, promote proper body mechanics and promote proper body breathing techniques. Progressed resistance, range, repetitions and complexity of movement as indicated. Date:  10/2 Date:   Date:     Activity/Exercise Parameters Parameters Parameters   Elbow flexion/extension 1-2 sets 5-10 reps AROM      Shoulder elevation 1-2 sets 5-10 reps AROM (reports R neck pain with this)     Shoulder ER 1-2 sets 2-3 reps. (reports neck pain with this)     Digit abduction/adduction 1-2 sets 5-10 reps AROM L hand                         MedBridge Portal  Treatment/Session Summary:  Patient is limited in terms of BUE strengthening especially with any shoulder exercises due to neck pain and constant R UE shaking. Continue OT per plan of care. · Response to Treatment:  tolerated without complications. · Communication/Consultation:  Progress report sent to MD  · Equipment provided today:  given long handled shoe horn, button hook, reacher, and sock aide  · Recommendations/Intent for next treatment session: Next visit will focus on advancement to more challenging activities. .    Total Treatment Billable Duration:  23 minutes  OT Patient Time In/Time Out  Time In: 1769  Time Out: 0845  Manuel Rolon, OTR/L    Future Appointments   Date Time Provider Sara Trinidad   10/4/2019  8:45 AM Fabiola Anderson DPT [facility]   10/4/2019  9:30 AM Nicoleabdifatah George, OTD, OTR/L [facility]   10/9/2019  8:45 AM Nicole George, OTD, OTR/L [facility]   10/9/2019  9:30 AM Wade Erazo, PTA [facility]   10/9/2019 10:15 AM Yury Das, SLP [facility]   10/11/2019  8:00 AM Fabiola Anderson, DPT [facility]   10/11/2019  8:45 AM Nicole George, OTD, OTR/L [facility]   10/11/2019  9:30 AM Yury Das, SLP [facility]   10/14/2019  8:00 AM Yury Das, SLP [facility]   10/14/2019  8:45 AM Nicole George, OTD, OTR/L [facility]   10/14/2019  9:30 AM Fabiola Anderson, ZENAIDAT [facility]   10/18/2019  8:45 AM Yury Das, SLP [facility]   10/18/2019  9:30 AM Nicole George, OTD, OTR/L [facility]   10/18/2019 10:15 AM Fidel Russell, DPT [facility]   10/23/2019  8:45 AM Nicole George, OTD, OTR/L [facility]   10/23/2019  9:30 AM Yury Das, SLP [facility]   10/23/2019 10:15 AM Wade Erazo, PTA [facility]   10/25/2019  9:30 AM Nicoleabdifatah George, OTD, OTR/L [facility]   10/25/2019 10:15 AM Yury Das, SLP [facility]   10/25/2019 11:00 AM Mitchel Medellin PTA [facility]

## 2019-10-02 NOTE — PROGRESS NOTES
Kayla Peterson  : 1970  Primary: [employer]  Secondary:  [address]  at [facility]  [address], [address], [place], [address] [facility]  [phone]   [phone]        OUTPATIENT PHYSICAL THERAPY: Daily Treatment Note 10/2/2019    ICD-10: Treatment Diagnosis: Unsteadiness on feet (R26.81)  Other lack of coordination (R27.8)  Repeated falls (R29.6)  TREATMENT PLAN:  Effective Dates: 2019 TO 10/29/2019 (90 days). Frequency/Duration: 2 times a week for 90 Day(s)    Pre-treatment Symptoms/Complaints:  Patient reports he hasn't been able to stand as much recently. He said every since he had to concentrate on colors during therapy, he has gone downhill. Pain: Initial:   7/10  Post Session:  same   Medications Last Reviewed:  10/2/2019  Updated Objective Findings:  increased unsteadiness in static standing today  TREATMENT:     THERAPEUTIC ACTIVITY: ( 25 minutes): Therapeutic activities per grid below to improve mobility, strength and coordination. Date:  19 Date:  19 Date:  19 Date:  10/2/19   Activity/Exercise  Parameters Parameters  Parameters   Rhythmic facilitation to decrease tone        Transfers - to and from mat   Stand pivot with rolling walker, min A     Bed mobility   Moderate assist rolling and supine to sit. Min A sit to supine     Sit to  bars and with rolling walker with gait belt and CGA  In parallel bars. 4 x 30 seconds- has to sit because of increased numbness. Knees buckled on the first rep and he required 2 person assistance to get him back to the chair. No fall, therapist held him by the gait belt. In bars x 3 reps with mod A   Static standing 2 x 60 sec with ll bars  In bars 4 x 30' trying to focus eyes on bathroom sign  2 x 30'- patient reported static standing is more difficult than ambulating.     Standing marching       Ambulation  In parallel bars with hands on rails and therapist assist with chair following behind him x 1          1 x 45 ft  1 x 60 ft with weighted walker and 2 person assist with wheelchair follow min to mod A  With rolling walker 3 x 20', patient with increased tremors noted during ambulation at the end of the session. Not today due to increased numbness and decreased control 3 x 10' in bars with min A x 2 persons and wheelchair follow for safety. Standing in ll bars - tapping foot forward and back  X 10 B  With assist of 1 Attempted but patient very unsteady and fatigued     Standing in ll bars - tapping foot to side and back  X 10 B with assist of 1                    Standing weight shifts           THERAPEUTIC EXERCISE: ( 25 minutes):  Exercises per grid below to improve mobility, strength and coordination. Date:  8-28-19 Date  9-6-19 Date:  9/11/19 Date:  10/2/19   Activity/Exercise   Paramters Parameters   Single knee to chest        Hamstring stretch  Seated stretch   2 x 30 sec B  Seated stretch 2 x 30 sec hold  Seated 3 x 30 sec B Seated 3 x 30 sec B- patient reports more neck pain with leg stretching   Lower trunk rotation        Long arc Quads  X 5 B 2 X 5 reps B active assist Attempted active and active assist- increased tone and tremors and poor ability to move through the range   Side lying - hip abduction        Side lying - R hip flexor stretch        Seated at edge of mat        Sit to stand        calft stretch   2 x 30 sec B X 30 sec B- patient reports mor neck pain with stretching   Ankle pumps   X 10 reps B active assist    Manual stretch to neck flexors       Cervical chin tuck isometrics 5 x 3 sec hold       Cervical ROM Through small ROM   X 5          MedBridge Portal  Treatment/Session Summary:  Patient is still very significantly limited by his tremors and is unable to perform may volitional movements because of them. Hoping for possible medication changes when they see the movement specialist next week.   Will continue PT to address symptoms of immobility but very little progress at this point. · Response to Treatment:  no adverse reactions. · Communication/Consultation:  None today   · Equipment provided today:  None today  · Recommendations/Intent for next treatment session: Next visit will focus on improving functional mobility and balance.       Total Treatment Billable Duration:  40 minutes   PT Patient Time In/Time Out  Time In: 0930  Time Out: [address], [place]    Future Appointments   Date Time Provider Sara Abdi   10/4/2019  8:45 AM Daniel Cardona DPT [facility]   10/4/2019  9:30 AM Ki Ayers, OTD, OTR/L [facility]   10/9/2019  8:45 AM Ki Ayers, OTD, OTR/L [facility]   10/9/2019  9:30 AM Natali Samuel PTA [facility]   10/9/2019 10:15 AM Jose Juan Das SLP [facility]   10/11/2019  8:00 AM Daniel Cardona DPT [facility]   10/11/2019  8:45 AM Ki Ayers, OTD, OTR/L [facility]   10/11/2019  9:30 AM Jose Juan Das SLP [facility]   10/14/2019  8:00 AM Jose Juan Das SLP [facility]   10/14/2019  8:45 AM Ki Ayers, OTD, OTR/L [facility]   10/14/2019  9:30 AM Daniel Cardona DPT [facility]   10/18/2019  8:45 AM Jose Juan Das SLP [facility]   10/18/2019  9:30 AM Ki Ayers, OTD, OTR/L [facility]   10/18/2019 10:15 AM Oriana Galdamez, ZENAIDAT [facility]   10/23/2019  8:45 AM Ki Ayers, OTD, OTR/L [facility]   10/23/2019  9:30 AM Jose Juan Das SLP [facility]   10/23/2019 10:15 AM Natali Samuel PTA [facility] D   10/25/2019  9:30 AM [facility] South[facility]   10/25/2019 10:15 AM Jose Juan Das, SLP [facility]   10/25/2019 11:00 AM [facility]z [facility]

## 2019-10-04 ENCOUNTER — HOSPITAL ENCOUNTER (OUTPATIENT)
Dept: PHYSICAL THERAPY | Age: 49
Discharge: HOME | End: 2019-10-04
Payer: COMMERCIAL

## 2019-10-04 PROCEDURE — 97542 WHEELCHAIR MNGMENT TRAINING: CPT

## 2019-10-04 PROCEDURE — 97530 THERAPEUTIC ACTIVITIES: CPT

## 2019-10-04 PROCEDURE — 97110 THERAPEUTIC EXERCISES: CPT

## 2019-10-04 NOTE — PROGRESS NOTES
Johana Ding  : 1970  Primary: [employer]  Secondary:  [address] at [facility]  [address], [address], [place], [facility]  [phone]   [phone]      OUTPATIENT OCCUPATIONAL THERAPY: Daily Treatment Note 10/4/2019  Visit Count:  36    ICD-10: Treatment Diagnosis:  Unspecified lack of coordination (R27.9); Dependence on wheelchair (Z99.3); History of falling (Z91.81)  Precautions/Allergies:   Patient has no allergy information on record. TREATMENT PLAN:  Effective Dates: 2019 TO 11/3/2019 (90 days). Frequency/Duration: 2 times a week for 90 Day(s)    Pre-treatment Symptoms/Complaints:   Pain: Initial: Pain Intensity 1: 6  Post Session:  same/10   Medications Last Reviewed:  10/4/2019   Updated Objective Findings:  Mat evaluation (10/4/19): Hip width: 15\"; Upper leg length: 18\" (R/L); lower leg length: 17\" (R/L); Shoulder: R: 22\"; L\": 24\"; Top of head: 33\"; Seat to arm: R: 9\"; L: 13\" ; shoulder to shoulder: 20\"; chest width: 13\" ; chest depth: 9\" ; lower arm length: 12\";   PRESSURE MAPPING Maximum Pressure Outcomes   Pressure Mapping #1 (seated edge of mat) 256 mmHG over L/R ischial tuberosities         RECOMMENDATIONS:        TREATMENT:       Therapeutic Activity: (    7 minutes): Therapeutic activities including Bed transfers, Chair transfers and w/c transfers to improve mobility, strength, balance and coordination. Required moderate   to promote static and dynamic balance in sitting and promote coordination of bilateral, upper extremity(s), trunk. Patient assisted to and from the w/c to mat/mat to w/c utilizing rolling walker. He pivoted to the R to transfer to the L due to shaking in his leg. Patient given cold wash cloth at end of session and wife assisted with wiping his forehead. FUNCTIONAL MOBILITY:   Transfers:   Wheelchair to [employer]: Stand pivot transfer to the R with minimal assistance. Mat to wheelchair.  Stand pivot transfer to the L with minimal assistance. Sit to Stand: Minimal assistance. Functional weight shift: Maximal assistance to complete functional weight-shifts. Wheelchair Management and Training: (38 minutes): Procedure(s) utilized to improve and/or restore functioning as related to power wheelchair mobility and seating/positioning in power wheelchair. Mr. Maryse Vo completed pressure mapping in objective section to provide the best pressure re-distribution off his bottom (see objective section) to determine most appropriate cushion. Mr. Maryse Vo also trialed a Acta Relief, Matrix deep contour back and ROHO Agility back verbalizing good comfort with Agility and deep contour backs. Therapeutic Exercise: (  0 minutes):  Exercises per grid below to improve mobility, strength, balance and coordination. Required moderate visual, verbal, manual and tactile cues to promote proper body alignment, promote proper body posture, promote proper body mechanics and promote proper body breathing techniques. Progressed resistance, range, repetitions and complexity of movement as indicated. Date:  10/2 Date:   Date:     Activity/Exercise Parameters Parameters Parameters   Elbow flexion/extension 1-2 sets 5-10 reps AROM      Shoulder elevation 1-2 sets 5-10 reps AROM (reports R neck pain with this)     Shoulder ER 1-2 sets 2-3 reps. (reports neck pain with this)     Digit abduction/adduction 1-2 sets 5-10 reps AROM L hand                         MedBridge Portal  Treatment/Session Summary:  Mr. Maryse Vo would benefit from a combination of  Agility and deep contour backs if power w/c becomes approved. He has increased pressure over his IT's seated edge of mat, so will likely benefit from cushion with sacral/IT well. Continue OT per plan of care. · Response to Treatment:  tolerated without complications.   · Communication/Consultation:  Progress report sent to MD  · Equipment provided today:  given long handled shoe horn, button hook, reacher, and sock aide  · Recommendations/Intent for next treatment session: Next visit will focus on advancement to more challenging activities. .    Total Treatment Billable Duration:  38 minutes  OT Patient Time In/Time Out  Time In: 0930  Time Out: 1015  Adin Liz OTR/L    Future Appointments   Date Time Provider Sara Abdi   10/9/2019  8:45 AM JADA Farmer, OTR/L [facility]   10/9/2019  9:30 AM Carlito Claros, PTA [facility]   10/9/2019 10:15 AM Radha Das SLP [facility]   10/11/2019  8:00 AM Nate Celestin DPT [facility] D   10/11/2019  8:45 AM JADA Farmer, OTR/L [facility] D   10/11/2019  9:30 AM Radha Das SLP [facility] D   10/14/2019  8:00 AM Radha Das SLP [facility] D   10/14/2019  8:45 AM JADA Farmer, OTR/L [facility] D   10/14/2019  9:30 AM Nate Celestin DPT [facility] D   10/18/2019  8:45 AM Radha Das SLP [facility] D   10/18/2019  9:30 AM [facility]   10/18/2019 10:15 AM Scott Davis, DPT [facility] D   10/23/2019  8:45 AM JADA Farmer, OTR/L [facility] D   10/23/2019  9:30 AM Radha Das SLP [facility] D   10/23/2019 10:15 AM Carlito Claros PTA [facility] D   10/25/2019  9:30 AM [facility]   10/25/2019 10:15 AM Radha Das SLP [facility] D   10/25/2019 11:00 AM Fauzia Dhaliwal PTA [facility] S[facility]

## 2019-10-04 NOTE — PROGRESS NOTES
Alia Mullins  : 1970  Primary: [employer]  Secondary:  [address]  at [facility]  [address], [address], [place], [facility]  [phone]   [phone]        OUTPATIENT PHYSICAL THERAPY: Daily Treatment Note 10/4/2019    ICD-10: Treatment Diagnosis: Unsteadiness on feet (R26.81)  Other lack of coordination (R27.8)  Repeated falls (R29.6)  TREATMENT PLAN:  Effective Dates: 2019 TO 10/29/2019 (90 days). Frequency/Duration: 2 times a week for 90 Day(s)    Pre-treatment Symptoms/Complaints:  Patient reports feeling okay today. Pain 6/10 in his neck  Pain: Initial:   6/10  Post Session:  same   Medications Last Reviewed:  10/4/2019  Updated Objective Findings:  None Today  TREATMENT:     THERAPEUTIC ACTIVITY: ( 15 minutes): Therapeutic activities per grid below to improve mobility, strength and coordination. Date:  19 Date:  19 Date:  10/2/19 Date:  10/4/19   Activity/Exercise Parameters Parameters  Parameters Parameters    Rhythmic facilitation to decrease tone        Transfers - to and from mat  Stand pivot with rolling walker, min A   Stand pivot transfers with 2 person assist, min A using rolling walker   Bed mobility  Moderate assist rolling and supine to sit. Min A sit to supine   Max A sit to sidelying and sidelying to sit   Sit to stand  In parallel bars. 4 x 30 seconds- has to sit because of increased numbness. Knees buckled on the first rep and he required 2 person assistance to get him back to the chair. No fall, therapist held him by the gait belt. In bars x 3 reps with mod A Min A x 2 persons, 5 reps with rolling walker   Static standing In bars 4 x 30' trying to focus eyes on bathroom sign  2 x 30'- patient reported static standing is more difficult than ambulating. Standing marching       Ambulation  With rolling walker 3 x 20', patient with increased tremors noted during ambulation at the end of the session. Not today due to increased numbness and decreased control 3 x 10' in bars with min A x 2 persons and wheelchair follow for safety. 3 x 20' with rolling walker, 2 person assist +wheelchair follow. Min A   Standing in ll bars - tapping foot forward and back  Attempted but patient very unsteady and fatigued      Standing in ll bars - tapping foot to side and back        Standing weight shifts           THERAPEUTIC EXERCISE: ( 25 minutes):  Exercises per grid below to improve mobility, strength and coordination. Date  9-6-19 Date:  9/11/19 Date:  10/2/19 Date:  10/4/19   Activity/Exercise  Paramters Parameters Parameters    Single knee to chest        Hamstring stretch  Seated stretch 2 x 30 sec hold  Seated 3 x 30 sec B Seated 3 x 30 sec B- patient reports more neck pain with leg stretching Seated 3 x 20 sec B -through small ROM   Lower trunk rotation        Long arc Quads X 5 B 2 X 5 reps B active assist Attempted active and active assist- increased tone and tremors and poor ability to move through the range Alternating sitting EOM 2 x 10 reps   Side lying - hip abduction     3 x 5 rep R   Side lying - R hip flexor stretch        Seated at edge of mat     Marching 3 x 10 reps    Sit to stand        calft stretch  2 x 30 sec B X 30 sec B- patient reports mor neck pain with stretching    Ankle pumps  X 10 reps B active assist  X 10 reps alternating seated EOB   Manual stretch to neck flexors       Cervical chin tuck isometrics       Cervical ROM          [facility] Portal  Treatment/Session Summary:  Patient with improved ability to sit EOM and do exercises but still very limited in overall mobility and progress. Attempted small range upper trunk rotation but patient reported low back pain before even initiating movement and was unable to do. · Response to Treatment:  no adverse reactions.   · Communication/Consultation:  None today   · Equipment provided today:  None today  · Recommendations/Intent for next treatment session: Next visit will focus on improving functional mobility and balance.       Total Treatment Billable Duration:  40 minutes   PT Patient Time In/Time Out  Time In: 0845  Time Out: [address], DPT    Future Appointments   Date Time Provider Sara Abdi   10/9/2019  8:45 AM JADA Farmer, OTR/L [facility] S[facility]   10/9/2019  9:30 AM Carlito Claros, PTA [facility]   10/9/2019 10:15 AM Radha Das SLP [facility]   10/11/2019  8:00 AM Nate Celestin DPT [facility]   10/11/2019  8:45 AM JADA Farmer, OTR/L [facility]   10/11/2019  9:30 AM Radha Das SLP [facility]   10/14/2019  8:00 AM Radha Das SLP [facility]   10/14/2019  8:45 AM JADA Farmer, OTR/L [facility]   10/14/2019  9:30 AM Nate Celestin DPT [facility]   10/18/2019  8:45 AM Radha Das, SLP [facility]   10/18/2019  9:30 AM JADA Farmer, OTR/L [facility] S[facility]   10/18/2019 10:15 AM Scott Davis, DPT [facility]   10/23/2019  8:45 AM JADA Farmer, OTR/L [facility]   10/23/2019  9:30 AM Radha Das SLP [facility]   10/23/2019 10:15 AM Carlito Claros PTA [facility]   10/25/2019  9:30 AM JADA Farmer, OTR/L [facility] SFD   10/25/2019 10:15 AM Radha Das SLP [facility]   10/25/2019 11:00 AM Fauzia Dhaliwal PTA [facility] SFD

## 2019-10-08 NOTE — PROGRESS NOTES
Justino Burns  : 1970  Primary: [employer]  Secondary:  [address]  at [facility]  [address], [address], [place] [address] [facility]  [phone]   [phone]        OUTPATIENT SPEECH LANGUAGE PATHOLOGY: Daily Note: 1    ICD-10: Treatment Diagnosis: cognitive communication deficits R 41.841  REFERRING PHYSICIAN: Chu Carlos MD MD Orders: speech evaluate and treat  Return Physician Appointment:    PAST MEDICAL HISTORY: TBI, sleep apnea  MEDICAL/REFERRING DIAGNOSIS: TBI (traumatic brain injury) ([facility] [address] [S06.9X9A]  DATE OF ONSET:   PRIOR LEVEL OF FUNCTION: with spouse and children   PRECAUTIONS/ALLERGIES: NKDA   ASSESSMENT:  Pt's spouse reported the pt went to the ophthalmologist on Friday and that he was told his vision is working well. However, all of the tests couldn't be completed due to the tremors. The opthamologist asked the  if the pt is going to go to a movement specialist.  The  reported they haven't found a movement disorders specialist that takes worker's comp. She reported they found a general neurologist in [place] which the pt's spouse reported they will see. Per the interpretor, the pt and spouse went to the PCP yesterday. The pt's spouse reported she told the PCP about blurry vision and they ran some blood tests. He was diagnosed with diabetes yesterday and he started taking insulin yesterday as a result. Pt reported when he was presented with the 2nd set of numbers he was able to recall them better as he spontaneously repeated the numbers internally. Therefore, he was able to recall them easier. He reported with the first set he often forgot one of the numbers. His spouse asked about a referral to GI as she reported they haven't heard anything. Informed her I cannot make a referral but it was under the recommendation in the MBS report that Dr. Renée Parrish received.   She reported the pt has an appointment with his PCP tomorrow. Suggested for her to mention it to him. Understanding expressed. Patient will benefit from skilled intervention to address the above impairments. ?????? ? ? This section established at most recent assessment??????????  PROBLEM LIST (Impairments causing functional limitations):  1. Decreased cognition  2. Anomia   GOALS: (Goals have been discussed and agreed upon with patient.)  SHORT-TERM FUNCTIONAL GOALS: Time Frame: 3 months   1. Pt will name pictures with 80% accuracy. 2. Pt will complete word finding tasks with 80% accuracy. 3. Pt will complete convergent/divergent naming tasks with 80% accuracy. 4. Pt will state his address with only min cues needed. 5. Pt will complete STM tasks with 80% accuracy. 6. Pt will participate in a full cognitive assessment x1. DISCHARGE GOALS: Time Frame: 4-5 months   1. Increased memory and expressive language skills needed for highest level of independent functioning. REHABILITATION POTENTIAL FOR STATED GOALS: FairPLAN OF CARE:  INTERVENTIONS PLANNED: (Benefits and precautions of therapy have been discussed with the patient.)  1. Cognitive tasks  2. Speech tasks  TREATMENT PLAN EFFECTIVE DATES: 7/31/2019 TO 10/30/2019 (90 days). FREQUENCY/DURATION: Continue to follow patient 2 times a week for 90 days to address above goals. Regarding [address] [place], [place] [address] therapy, I certify that the treatment plan above will be carried out by a therapist or under their direction. Thank you for this referral,  [place], [employer] [facility], [facility], [address] [facility]                     Referring Physician Signature: Kathleen Garza MD     Date      SUBJECTIVE:  Pt cooperative. Spouse and interpretors present.     Present Symptoms: decreased cognition s/p TBI in 2016      Current Medications: see hard chart    Date Last Reviewed: 10/9/19  Social History/Home Situation: residing with spouse and their 5 children      Work/Activity History:  for [employer] OBJECTIVE:  Objective Measure: Tool Used: National Outcomes Measurement System: Functional Communication Measures: SPOKEN LANGUAGE EXPRESSION  Score:  Initial: 5 Most Recent: 5 (Date: 10/2/19 )   Interpretation of Tool: This measure describes the change in functional communication status subsequent to speech-language pathology treatment of patients who have spoken language expression deficits. o Level 1:  The individual attempts to speak, but verbalizations are not meaningful to familiar or unfamiliar communication partners at any time. o Level 2:  The individual attempts to speak, although few attempts are accurate or appropriate. The communication partner must assume responsibility for structuring the communication exchange, and with consistent and maximal cueing, the individual can only occasionally produce automatic and/or imitative words and phrases that are rarely meaningful in context.  o Level 3:  The communication partner must assume responsibility for structuring the communication exchange, and with consistent and moderate cueing, the individual can produce words and phrases that are appropriate and meaningful in context.  o Level 4:  The individual is successfully able to initiate communication using spoken language in simple, structured conversations in routine daily activities with familiar communication partners. The individual usually requires moderate cueing, but is able to demonstrate use of simple sentences (i.e., semantics, syntax, and morphology) and rarely uses complex sentences/messages. o Level 5:  The individual is successfully able to initiate communication using spoken language in structured conversations with both familiar and unfamiliar communication partners. The individual occasionally requires minimal cueing to frame more complex sentences in messages. The individual occasionally self-cues when encountering difficulty.   o Level 6:  The individual is successfully able to communicate in most activities, but some limitations in spoken language are still apparent in vocational, avocational, and social activities. The individual rarely requires minimal cueing to frame complex sentences. The individual usually self-cues when encountering difficulty. o Level 7: The individuals ability to successfully and independently participate in vocational, avocational, and social activities is not limited by spoken language skills. Independent functioning may occasionally include use of self-cueing. Score Level 7 Level 6 Level 5 Level 4 Level 3 Level 2 Level 1   Modifier CH CI CJ CK CL CM CN     Mental Status:  Alert    Neuro-Linguistics:  Repeating 5 word sentences: 40%. Stating the largest number when 3 single digit numbers were presented orally: 40% with 1st set which increased to 60% with repetitions. 80% with the 2nd set. Describing action pictures: 90%. Cognitive Skills Activities: Activities/Procedures listed utilized to improve and/or restore cognitive function as related to thought organization. Required moderate verbal cueing to improve improve recall of information. __________________________________________________________________________________________________  History of Present Injury/Illness (Reason for Referral):    Treatment Assessment:   . Progression/Medical Necessity:   · Skilled intervention continues to be required due to decreased communication with family/caregivers and decreased cognitive skills. Compliance with Program/Exercises: Will assess as treatment progresses}. Reason for Continuation of Services/Other Comments:  · Patient continues to require skilled intervention due to decreased cognition, anomia. Recommendations/Intent for next treatment session: \"Treatment next visit will focus on cognitive, speech tasks\".      Total Treatment Duration:  Time In: 1020  Time Out: Alice Guerrero, RENUKA, CCC-SLP

## 2019-10-09 ENCOUNTER — HOSPITAL ENCOUNTER (OUTPATIENT)
Dept: PHYSICAL THERAPY | Age: 49
Discharge: HOME | End: 2019-10-09
Payer: COMMERCIAL

## 2019-10-09 PROCEDURE — 97530 THERAPEUTIC ACTIVITIES: CPT

## 2019-10-09 PROCEDURE — 92507 TX SP LANG VOICE COMM INDIV: CPT

## 2019-10-09 PROCEDURE — 97542 WHEELCHAIR MNGMENT TRAINING: CPT

## 2019-10-09 PROCEDURE — 97110 THERAPEUTIC EXERCISES: CPT

## 2019-10-09 NOTE — PROGRESS NOTES
Olman Hannah  : 1970  Primary: [employer]  Secondary:  [address]  at [facility]  [address], [address], [place] [address] [facility]  [phone]   [phone]      OUTPATIENT OCCUPATIONAL THERAPY: Daily Treatment Note 10/9/2019  Visit Count:  37    ICD-10: Treatment Diagnosis:  Unspecified lack of coordination (R27.9); Dependence on wheelchair (Z99.3); History of falling (Z91.81)  Precautions/Allergies:   Patient has no allergy information on record. TREATMENT PLAN:  Effective Dates: 2019 TO 11/3/2019 (90 days). Frequency/Duration: 2 times a week for 90 Day(s)    Pre-treatment Symptoms/Complaints:   Pain: Initial:    Post Session:  same/10   Medications Last Reviewed:  10/9/2019   Updated Objective Findings:  Mat evaluation (10/4/19): Hip width: 15\"; Upper leg length: 18\" (R/L); lower leg length: 17\" (R/L); Shoulder: R: 22\"; L\": 24\"; Top of head: 33\"; Seat to arm: R: 9\"; L: 13\" ; shoulder to shoulder: 20\"; chest width: 13\" ; chest depth: 9\" ; lower arm length: 12\";   PRESSURE MAPPING Maximum Pressure Outcomes   Pressure Mapping #1 (seated edge of mat) 256 mmHG over L/R ischial tuberosities         RECOMMENDATIONS:        TREATMENT:       Therapeutic Activity: (    8 minutes): Therapeutic activities including Bed transfers, Chair transfers and w/c transfers to improve mobility, strength, balance and coordination. Required moderate   to promote static and dynamic balance in sitting and promote coordination of bilateral, upper extremity(s), trunk. Patient assisted to and from the w/c to mat/mat to w/c utilizing rolling walker. He pivoted to the R to transfer to the L due to shaking in his leg. Patient stood multiple times prior to placing w/c cushion underneath patient with rolling walker and minimal to moderate assistance. FUNCTIONAL MOBILITY:   Transfers:   Wheelchair to [employer]: Stand pivot transfer to the R with minimal assistance. Mat to wheelchair. Stand pivot transfer to the L with minimal assistance. Sit to Stand: Minimal assistance. Functional weight shift: Maximal assistance to complete functional weight-shifts. Wheelchair Management and Training: (30 minutes): Procedure(s) utilized to improve and/or restore functioning as related to power wheelchair mobility and seating/positioning in power wheelchair. Mr. Sandeep Haywood trialed various cushion setups to provide the best pressure re-distribution off his bottom. He trialed a [address] and [employer] hybrid elite cushion stating the M2 cushion was the most comfortable. Therapeutic Exercise: (  0 minutes):  Exercises per grid below to improve mobility, strength, balance and coordination. Required moderate visual, verbal, manual and tactile cues to promote proper body alignment, promote proper body posture, promote proper body mechanics and promote proper body breathing techniques. Progressed resistance, range, repetitions and complexity of movement as indicated. Date:  10/2 Date:   Date:     Activity/Exercise Parameters Parameters Parameters   Elbow flexion/extension 1-2 sets 5-10 reps AROM      Shoulder elevation 1-2 sets 5-10 reps AROM (reports R neck pain with this)     Shoulder ER 1-2 sets 2-3 reps. (reports neck pain with this)     Digit abduction/adduction 1-2 sets 5-10 reps AROM L hand                         MedBridge Portal  Treatment/Session Summary:  Mr. Sandeep Haywood would benefit from a [facility] M2 cushion as it was the most comfortable and shoulder provide the best pressure re-distribution. .  Continue OT per plan of care. · Response to Treatment:  tolerated without complications. · Communication/Consultation:  Progress report sent to MD  · Equipment provided today:  given long handled shoe horn, button hook, reacher, and sock aide  · Recommendations/Intent for next treatment session: Next visit will focus on advancement to more challenging activities. .    Total Treatment Billable Duration:  38 minutes     Mendez Dailey, OTR/L    Future Appointments   Date Time Provider Sara Abdi   10/9/2019  9:30 AM Julio Kruger, MOE [facility]   10/9/2019 10:15 AM Tahir Das, SLP [facility] S[facility]   10/11/2019  8:00 AM Nedra Hemant, DPT [facility]   10/11/2019  8:45 AM Oakland Locus, OTD, OTR/L [facility]   10/11/2019  9:30 AM Tahir Das, SLP [facility]   10/14/2019  8:00 AM Tahir Das, SLP [facility]FD   10/14/2019  8:45 AM Oakland Locus, OTD, OTR/L [facility]   10/14/2019  9:30 AM Wallace Hemant, DPT [facility]   10/18/2019  8:45 AM Tahir Das, SLP [facility]   10/18/2019  9:30 AM Oakland Locus, OTD, OTR/L [facility]   10/18/2019 10:15 AM Sunil Redd, DPT [facility]   10/23/2019  8:45 AM Oakland Locus, OTD, OTR/L [facility]   10/23/2019  9:30 AM Tahir Das, SLP [facility]   10/23/2019 10:15 AM Julio Kruger, PTA [facility]   10/25/2019  9:30 AM Bishnu Locus, OTD, OTR/L [facility] SFD   10/25/2019 10:15 AM Tahir Das, SLP [facility] SFD   10/25/2019 11:00 AM Boni Saunders, [facility] SFD

## 2019-10-09 NOTE — PROGRESS NOTES
Emily Grullon  : 1970  Primary: [employer]  Secondary:  [address]  at [facility]  [address], [address], [place], [facility]  [phone]   [phone]        OUTPATIENT PHYSICAL THERAPY: Daily Treatment Note 10/9/2019    ICD-10: Treatment Diagnosis: Unsteadiness on feet (R26.81)  Other lack of coordination (R27.8)  Repeated falls (R29.6)  TREATMENT PLAN:  Effective Dates: 2019 TO 10/29/2019 (90 days). Frequency/Duration: 2 times a week for 90 Day(s)    Pre-treatment Symptoms/Complaints:  Patient reports having a headache this morning and is wearing sunglasses due to light sensitivity. Patient also states that he has recently been diagnosed with diabetes and is having difficulty regulating blood sugar levels. PTA inquired about testing to determine average glucose levels and confirmed that patient had eaten breakfast prior to beginning treatment. Pain: Initial:   not rated  Post Session:  Not rated    Medications Last Reviewed:  10/9/2019  Updated Objective Findings:  None Today  TREATMENT:     THERAPEUTIC ACTIVITY: ( 15 minutes): Therapeutic activities per grid below to improve mobility, strength and coordination. Date:  19 Date:  19 Date:  10/2/19 Date:  10/4/19 Date:  10-9-19   Activity/Exercise Parameters Parameters  Parameters Parameters     Rhythmic facilitation to decrease tone         Transfers - to and from mat  Stand pivot with rolling walker, min A   Stand pivot transfers with 2 person assist, min A using rolling walker    Bed mobility  Moderate assist rolling and supine to sit. Min A sit to supine   Max A sit to sidelying and sidelying to sit    Sit to stand  In parallel bars. 4 x 30 seconds- has to sit because of increased numbness. Knees buckled on the first rep and he required 2 person assistance to get him back to the chair. No fall, therapist held him by the gait belt.   In bars x 3 reps with mod A Min A x 2 persons, 5 reps with rolling walker X 4 with mod A   Static standing In bars 4 x 30' trying to focus eyes on bathroom sign  2 x 30'- patient reported static standing is more difficult than ambulating. Standing marching        Ambulation  With rolling walker 3 x 20', patient with increased tremors noted during ambulation at the end of the session. Not today due to increased numbness and decreased control 3 x 10' in bars with min A x 2 persons and wheelchair follow for safety. 3 x 20' with rolling walker, 2 person assist +wheelchair follow. Min A 3 x 5 ft with min A and blocking R knee with steps    Standing in ll bars - tapping foot forward and back  Attempted but patient very unsteady and fatigued       Standing in ll bars - tapping foot to side and back         Standing weight shifts            THERAPEUTIC EXERCISE: ( 25 minutes):  Exercises per grid below to improve mobility, strength and coordination.        Date  9-6-19 Date:  9/11/19 Date:  10/2/19 Date:  10/4/19 Date:  10/9/19   Activity/Exercise  Paramters Parameters Parameters  Parameters   Single knee to chest         Hamstring stretch  Seated stretch 2 x 30 sec hold  Seated 3 x 30 sec B Seated 3 x 30 sec B- patient reports more neck pain with leg stretching Seated 3 x 20 sec B -through small ROM Seated 3x30 sec B through small ROM with noted pain in R LE   Lower trunk rotation         Long arc Quads X 5 B 2 X 5 reps B active assist Attempted active and active assist- increased tone and tremors and poor ability to move through the range Alternating sitting EOM 2 x 10 reps 2 x 5 with mod A on R   Side lying - hip abduction     3 x 5 rep R    Side lying - R hip flexor stretch         Seated at edge of mat     Marching 3 x 10 reps     Sit to stand         calft stretch  2 x 30 sec B X 30 sec B- patient reports mor neck pain with stretching     Ankle pumps  X 10 reps B active assist  X 10 reps alternating seated EOB X 10 B alternating   Manual stretch to neck flexors        Cervical chin tuck isometrics        Cervical ROM     X 2 or 3 each direction before he asked to stop due to pain. [facility]  Treatment/Session Summary:  Patient with difficulty with cervical ROM. He reports just the touch of my hands on his head hurts. Patient reports through  that the movement doctor will not see him with it being Worker's Comp. He and his wife both are disappointed. Patient with severe low back and R hip pain with standing, moving R LE, and weight bearing while trying to advance L LE. Patient required multiple rest breaks due to back and hip pain today. · Response to Treatment:  no adverse reactions. · Communication/Consultation:  None today   · Equipment provided today:  None today  · Recommendations/Intent for next treatment session: Next visit will focus on improving functional mobility and balance.       Total Treatment Billable Duration:  45 minutes   PT Patient Time In/Time Out  Time In: 0930  Time Out: [facility]    Future Appointments   Date Time Provider Sara Abdi   10/11/2019  8:00 AM Lovell Essex, DPT [facility]   10/11/2019  8:45 AM Betwu Primmer, OTD, OTR/L [facility]   10/11/2019  9:30 AM Yanet Das SLP [facility]   10/14/2019  8:00 AM Yanet Das SLP [facility]   10/14/2019  8:45 AM Salvatore Primmer, OTD, OTR/L [facility]   10/14/2019  9:30 AM Lovell Essex, DPT [facility]   10/18/2019  8:45 AM Yanet Das SLP [facility]   10/18/2019  9:30 AM Bethanne Primmer, OTD, OTR/L [facility]   10/18/2019 10:15 AM Lonzell Hatchet, Peder Finn, DPT [facility]   10/23/2019  8:45 AM Bethanne Primmer, OTD, OTR/L [facility]   10/23/2019  9:30 AM Yanet Das SLP [facility]   10/23/2019 10:15 AM Skyla Rod PTA [facility]   10/25/2019  9:30 [facility]   10/25/2019 10:15 AM Yanet Das, SLP [facility]   10/25/2019 11:00 AM Jimy TAI PTA [facility]

## 2019-10-11 ENCOUNTER — HOSPITAL ENCOUNTER (OUTPATIENT)
Dept: PHYSICAL THERAPY | Age: 49
Discharge: HOME | End: 2019-10-11
Payer: COMMERCIAL

## 2019-10-11 PROCEDURE — 97530 THERAPEUTIC ACTIVITIES: CPT

## 2019-10-11 PROCEDURE — 97110 THERAPEUTIC EXERCISES: CPT

## 2019-10-11 PROCEDURE — 97542 WHEELCHAIR MNGMENT TRAINING: CPT

## 2019-10-11 PROCEDURE — 92507 TX SP LANG VOICE COMM INDIV: CPT

## 2019-10-11 NOTE — PROGRESS NOTES
Sarabjit Tyson  : 1970  Primary: [employer]  Secondary:  [address]  at [facility]  [address], [address], [address]  [phone]   [phone]        OUTPATIENT SPEECH LANGUAGE PATHOLOGY: Daily Note: 2    ICD-10: Treatment Diagnosis: cognitive communication deficits R 41.841  REFERRING PHYSICIAN: Merrick Wilks MD MD Orders: speech evaluate and treat  Return Physician Appointment:    PAST MEDICAL HISTORY: TBI, sleep apnea  MEDICAL/REFERRING DIAGNOSIS: TBI (traumatic brain injury) ([facility] [address] [S06.9X9A]  DATE OF ONSET:   PRIOR LEVEL OF FUNCTION: with spouse and children   PRECAUTIONS/ALLERGIES: NKDA   ASSESSMENT:  Pt missed some items with convergent naming due to decreased comprehension of the task vs due to anomia. Pt did well with stating inconsistencies but exhibited difficulties with repairing them again due to decreased comprehension. Patient will benefit from skilled intervention to address the above impairments. ?????? ? ? This section established at most recent assessment??????????  PROBLEM LIST (Impairments causing functional limitations):  1. Decreased cognition  2. Anomia   GOALS: (Goals have been discussed and agreed upon with patient.)  SHORT-TERM FUNCTIONAL GOALS: Time Frame: 3 months   1. Pt will name pictures with 80% accuracy. 2. Pt will complete word finding tasks with 80% accuracy. 3. Pt will complete convergent/divergent naming tasks with 80% accuracy. 4. Pt will state his address with only min cues needed. 5. Pt will complete STM tasks with 80% accuracy. 6. Pt will participate in a full cognitive assessment x1. DISCHARGE GOALS: Time Frame: 4-5 months   1. Increased memory and expressive language skills needed for highest level of independent functioning.   REHABILITATION POTENTIAL FOR STATED GOALS: FairPLAN OF CARE:  INTERVENTIONS PLANNED: (Benefits and precautions of therapy have been discussed with the patient.)  1. Cognitive tasks  2. Speech tasks  TREATMENT PLAN EFFECTIVE DATES: 7/31/2019 TO 10/30/2019 (90 days). FREQUENCY/DURATION: Continue to follow patient 2 times a week for 90 days to address above goals. Regarding [address] [place] [address] Road therapy, I certify that the treatment plan above will be carried out by a therapist or under their direction. Thank you for this referral,  Thelma Elizalde, [facility], [facility] [place], [address] [facility]                     Referring Physician Signature: Ashok Villalta MD     Date      SUBJECTIVE:  Pt cooperative. Spouse and interpretors present. Present Symptoms: decreased cognition s/p TBI in 2016      Current Medications: see hard chart    Date Last Reviewed: 10/9/19  Social History/Home Situation: residing with spouse and their 5 children      Work/Activity History:  for [employer]     OBJECTIVE:  Objective Measure: Tool Used: National Outcomes Measurement System: Functional Communication Measures: SPOKEN LANGUAGE EXPRESSION  Score:  Initial: 5 Most Recent: 5 (Date: 10/2/19 )   Interpretation of Tool: This measure describes the change in functional communication status subsequent to speech-language pathology treatment of patients who have spoken language expression deficits. o Level 1:  The individual attempts to speak, but verbalizations are not meaningful to familiar or unfamiliar communication partners at any time. o Level 2:  The individual attempts to speak, although few attempts are accurate or appropriate.   The communication partner must assume responsibility for structuring the communication exchange, and with consistent and maximal cueing, the individual can only occasionally produce automatic and/or imitative words and phrases that are rarely meaningful in context.  o Level 3:  The communication partner must assume responsibility for structuring the communication exchange, and with consistent and moderate cueing, the individual can produce words and phrases that are appropriate and meaningful in context.  o Level 4:  The individual is successfully able to initiate communication using spoken language in simple, structured conversations in routine daily activities with familiar communication partners. The individual usually requires moderate cueing, but is able to demonstrate use of simple sentences (i.e., semantics, syntax, and morphology) and rarely uses complex sentences/messages. o Level 5:  The individual is successfully able to initiate communication using spoken language in structured conversations with both familiar and unfamiliar communication partners. The individual occasionally requires minimal cueing to frame more complex sentences in messages. The individual occasionally self-cues when encountering difficulty. o Level 6:  The individual is successfully able to communicate in most activities, but some limitations in spoken language are still apparent in vocational, avocational, and social activities. The individual rarely requires minimal cueing to frame complex sentences. The individual usually self-cues when encountering difficulty. o Level 7: The individuals ability to successfully and independently participate in vocational, avocational, and social activities is not limited by spoken language skills. Independent functioning may occasionally include use of self-cueing. Score Level 7 Level 6 Level 5 Level 4 Level 3 Level 2 Level 1   Modifier CH CI CJ CK CL CM CN     Mental Status:  Alert    Neuro-Linguistics:  Convergent naming (concrete): 85%. Identifying inconsistencies in sentences: 100%. Correcting the inconsistencies: 3/5 independently and 4/5 with cues. Cognitive Skills Activities: Activities/Procedures listed utilized to improve and/or restore cognitive function as related to thought organization.  Required moderate verbal cueing to improve improve recall of information. __________________________________________________________________________________________________  History of Present Injury/Illness (Reason for Referral):    Treatment Assessment:   . Progression/Medical Necessity:   · Skilled intervention continues to be required due to decreased communication with family/caregivers and decreased cognitive skills. Compliance with Program/Exercises: Will assess as treatment progresses}. Reason for Continuation of Services/Other Comments:  · Patient continues to require skilled intervention due to decreased cognition, anomia. Recommendations/Intent for next treatment session: \"Treatment next visit will focus on cognitive, speech tasks\".      Total Treatment Duration:  Time In: 0930  Time Out: [address], MSP, CCC-SLP

## 2019-10-11 NOTE — PROGRESS NOTES
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 while outdoors (i.e. Checking for traffic at crosswalks, parking wheelchair within safe distances of objects)   [x] YES [] NO Stop and go concepts while in the wheelchair (i.e. \"stop and go\" verbal instructions, or consistently stopping for Objects)   [x] YES [] NO Directional concepts while in the wheelchair (i.e. moving the joystick in different directions to move the wheelchair in different directions)   [x] YES [] NO Ability to follow directions: (i.e. Following varying verbal commands in a safe and timely manner)   [x] YES [] NO Adequate visual functioning to safely drive outdoors (i.e. Visual attention to environment and ability to avoid obstacles)   [x] YES [] NO Adequate problem solving ability (i.e. Using curb cutouts when available, shortest/safest route without verbal cues)   [x] YES [] NO Ability to use access method with adequate activation, sustained contact, and release (i.e. Demonstrate safe endurance while operating controls, motor control when maneuvering over terrain)   [x] YES [] NO Ability to change drives and or speeds as appropriate for environment (i.e. Decreasing speed when approaching curbs)   [x] YES [] NO Door access   [x] YES [] NO Curb cutout   [x] YES [] NO Sidewalk with irregularities   [x] YES [] NO Crosswalk   [x] YES [] NO Parking within two feet of target   [x] YES [] NO Inclines (up and down)   [x] YES [] NO Endurance for > 25 feet (power wheelchair control)   COMMENTS:        · Bed Mobility:      Transfers: Wheelchair to [employer]: Stand pivot transfer to the L with minimal assistance. Mat to wheelchair. Stand pivot transfer to the L with minimal assistance. · Sit to Stand: Minimal assistance. RANGE OF MOTION  Left  Right Comments:          Upper Extremity  LUE AROM  L Shoulder Flexion: 100  L Shoulder ABduction: 100 RUE AROM  R Shoulder Flexion: 85  R Shoulder ABduction: 85(Reports lateral R neck pain with this motion)           Lower Extremity                STRENGTH  Left Right Comments:   Upper Extremity  L Shoulder Flexion: 4-  L Shoulder ABduction: 4-  L Elbow Flexion: 4-  L Elbow Extension: 3+  L Wrist Extension: 4-  L Digital Flexion: 4-  L Digital Extension: 4-  L Digital Adduction: 4-  L : 4- R Shoulder Flexion: 3-(partly limited due to pain)  R Shoulder ABduction: 3-  R Elbow Flexion: 3-  R Elbow Extension: 3-  R Wrist Extension: 3-  R Digital Flexion: 3-  R Digital Extension: 3-  R Digital adduction: 3  R : 4- Finger to thumb oppposition: Able to complete on R, but not on L; however, limited function due to \"shaking. \"                   BALANCE:         ADLs from General Assessment:  Basic ADL  Feeding:  Moderate assistance versus Maximum assistance  Oral Facial Hygiene/Grooming: Maximum assistance  Bathing: Maximal assistance versus Total assistance  Upper Body Dressing: Minimal assistance versus Maximum assistance  Lower Body Dressing: Minimal assistance versus Total assistance  Toileting: Maximal assistance versus Total assistance    Instrumental ADL  Meal Preparation: Total assistance  Homemaking: Total assistance  Medication Management: Total assistance  Financial Management: Total assistance     Mat evaluation (10/4/19): Hip width: 15\"; Upper leg length: 18\" (R/L); lower leg length: 17\" (R/L); Shoulder: R: 22\"; L\": 24\"; Top of head: 33\"; Seat to arm: R: 9\"; L: 13\" ; shoulder to shoulder: 20\"; chest width: 13\" ; chest depth: 9\" ; lower arm length: 12\";   PRESSURE MAPPING Maximum Pressure Outcomes   Pressure Mapping #1 (seated edge of mat) 256 mmHG over L/R ischial tuberosities             RECOMMENDATIONS:            Physical Skills Involved:  1. Range of Motion  2. Balance  3. Strength  4. Activity Tolerance  5. Sensation  6. Fine Motor Control  7. Gross Motor Control  8. Vision  9. Pain (acute)  10. Pain (Chronic) Cognitive Skills Affected (resulting in the inability to perform in a timely and safe manner):  1. Perception  2. Executive Function  3. Divided Attention Psychosocial Skills Affected:  1. Habits/Routines  2. Environmental Adaptation  3. Social Interaction  4. Emotional Regulation  5. Social Roles   Number of elements that affect the Plan of Care[de-identified] 5+:  HIGH COMPLEXITY   CLINICAL DECISION MAKING:   Clinical Decision-Making Assessment:  Jessica Nelson required moderate to maximal verbal, visual, physical or environmental cues to carry out assessment tasks.    Assessment process, impact of co-morbidities, assessment modification\need for assistance, and selection of interventions: Analytical Complexity:HIGH COMPLEXITY

## 2019-10-11 NOTE — PROGRESS NOTES
Erick Lux  : 1970  Primary: [employer]  Secondary:  [address] at [facility]  [address], [place] [address] [facility]  [phone]   [phone]      OUTPATIENT OCCUPATIONAL THERAPY: Daily Treatment Note 10/11/2019  Visit Count:  41    ICD-10: Treatment Diagnosis:  Unspecified lack of coordination (R27.9); Dependence on wheelchair (Z99.3); History of falling (Z91.81)  Precautions/Allergies:   Patient has no allergy information on record. TREATMENT PLAN:  Effective Dates: 2019 TO 11/3/2019 (90 days). Frequency/Duration: 2 times a week for 90 Day(s)    Pre-treatment Symptoms/Complaints:   Pain: Initial: Pain Intensity 1: (no complaints of pain)  Post Session:  same/10   Medications Last Reviewed:  10/11/2019   Updated Objective Findings:  Mat evaluation (10/4/19): Hip width: 15\"; Upper leg length: 18\" (R/L); lower leg length: 17\" (R/L); Shoulder: R: 22\"; L\": 24\"; Top of head: 33\"; Seat to arm: R: 9\"; L: 13\" ; shoulder to shoulder: 20\"; chest width: 13\" ; chest depth: 9\" ; lower arm length: 12\";   PRESSURE MAPPING Maximum Pressure Outcomes   Pressure Mapping #1 (seated edge of mat) 256 mmHG over L/R ischial tuberosities         RECOMMENDATIONS:        TREATMENT:       Therapeutic Activity: (    7 minutes): Therapeutic activities including Bed transfers, Chair transfers and w/c transfers to improve mobility, strength, balance and coordination. Required moderate   to promote static and dynamic balance in sitting and promote coordination of bilateral, upper extremity(s), trunk. Patient assisted to and from the w/c to mat/mat to w/c utilizing rolling walker. He pivoted to the R to transfer to the L due to shaking in his leg. Patient stood multiple times prior to placing w/c cushion underneath patient with rolling walker and minimal to moderate assistance.    FUNCTIONAL MOBILITY:   Transfers:   Wheelchair to Wheelchair: Stand pivot transfer to the R with minimal assistance x 2 reps utilizing rolling walker. Sit to Stand: Minimal assistance. Functional weight shift: Maximal assistance to complete functional weight-shifts. Wheelchair Management and Training: (38 minutes): Procedure(s) utilized to improve and/or restore functioning as related to power wheelchair mobility and seating/positioning in power wheelchair. Mr. Bettie Alvarez also demonstrated ability to lower the foot plates and operate power elevating leg rests, power tilt and power recline as needed. (I.e. For sit to stand, for comfort in reclined position, etc.).   INDOOR TRAINING:  [x] YES [] NO Cause and effect concepts while in the wheelchair (i.e. Activating a switch causes the wheelchair to move)   [x] YES [] NO Stop and go concepts while in the wheelchair (i.e. \"stop and go\" verbal instructions, or consistently stopping for objects)   [x] YES [] NO Directional concepts while in the wheelchair (i.e. moving the joystick in different directions to move the wheelchair in different directions)   [x] YES [] NO Ability to follow directions: (i.e. Following varying verbal commands in a safe and timely manner)   [x] YES [] NO Adequate visual functioning to safely drive indoors (i.e. Visual attention to environment and ability to avoid obstacles)   [x] YES [] NO Adequate problem solving ability (i.e. Maneuver power wheelchair to designated destination without verbal cues)   [x] YES [] NO Ability to use access method with adequate activation, sustained contact and release (i.e. Able to access switch, control contact, and release when ready to stop wheelchair)   [x] YES [] NO Ability to change drives and or speeds as appropriate for environment (i.e. Decreasing speed in high traffic areas)   [x] YES [] NO Client ready to complete outdoor portion of power wheelchair mobility assessment   [x] YES [] NO Small space maneuverability (room)   [x] YES [] NO Large space maneuverability (hallway)   [x] YES [] NO Accessing Doorways   COMMENTS: Able to drive w/c in reverse with minimal cues        OUTDOOR TRAINING:   [x] YES [] NO Adequate safety judgment while outdoors (i.e. Checking for traffic at crosswalks, parking wheelchair within safe distances of objects)   [x] YES [] NO Stop and go concepts while in the wheelchair (i.e. \"stop and go\" verbal instructions, or consistently stopping for Objects)   [x] YES [] NO Directional concepts while in the wheelchair (i.e. moving the joystick in different directions to move the wheelchair in different directions)   [x] YES [] NO Ability to follow directions: (i.e. Following varying verbal commands in a safe and timely manner)   [x] YES [] NO Adequate visual functioning to safely drive outdoors (i.e. Visual attention to environment and ability to avoid obstacles)   [x] YES [] NO Adequate problem solving ability (i.e. Using curb cutouts when available, shortest/safest route without verbal cues)   [x] YES [] NO Ability to use access method with adequate activation, sustained contact, and release (i.e. Demonstrate safe endurance while operating controls, motor control when maneuvering over terrain)   [x] YES [] NO Ability to change drives and or speeds as appropriate for environment (i.e. Decreasing speed when approaching curbs)   [x] YES [] NO Door access   [x] YES [] NO Curb cutout   [x] YES [] NO Sidewalk with irregularities   [x] YES [] NO Crosswalk   [x] YES [] NO Parking within two feet of target   [x] YES [] NO Inclines (up and down)   [x] YES [] NO Endurance for > 25 feet (power wheelchair control)   COMMENTS:      Therapeutic Exercise: (  0 minutes):  Exercises per grid below to improve mobility, strength, balance and coordination. Required moderate visual, verbal, manual and tactile cues to promote proper body alignment, promote proper body posture, promote proper body mechanics and promote proper body breathing techniques.   Progressed resistance, range, repetitions and complexity of movement as indicated. Date:  10/2 Date:   Date:     Activity/Exercise Parameters Parameters Parameters   Elbow flexion/extension 1-2 sets 5-10 reps AROM      Shoulder elevation 1-2 sets 5-10 reps AROM (reports R neck pain with this)     Shoulder ER 1-2 sets 2-3 reps. (reports neck pain with this)     Digit abduction/adduction 1-2 sets 5-10 reps AROM L hand                         [employer] Portal  Treatment/Session Summary:  Mr. Urbano Valente did well with driving power w/c in indoor/outdoor environments and would benefit from one to maximize his overall independence with ADL/instrumental ADL/functional mobility for ADL. Continue OT per plan of care. · Response to Treatment:  tolerated without complications. · Communication/Consultation:  Progress report sent to MD  · Equipment provided today:  given long handled shoe horn, button hook, reacher, and sock aide  · Recommendations/Intent for next treatment session: Next visit will focus on advancement to more challenging activities. .    Total Treatment Billable Duration:  38 minutes  OT Patient Time In/Time Out  Time In: 0845  Time Out: 0930  GERALD Frazier/TRACY    Future Appointments   Date Time Provider Sara Abdi   10[facility]14/2019  8[facility]   10/14/2019  8:45 AM Dorcus Bloch, OTD, OTR/L [facility]   10/14/2019  9:30 AM Rudy Yadav DPT [facility]   10/18/2019  8:45 AM Xavi aDs SLP [facility]   10/18/2019  9:30 AM Dorcus Bloch, OTD, OTR/L [facility]   10/18/2019 10:15 AM Sharon Chew, PTA [facility]   10/23/2019  8:45 AM Dorcus Bloch, OTD, OTR/L [facility]   10/23/2019  9:30 AM Xavi Das SLP [facility]   10/23/2019 10:15 AM Sharon Chew, PTA [facility]   10/25/2019  9:30 AM Dor[facility] Bl[facility]TD[facility]   10/25/2019 10:15 AM Xavi Das, SLP [facility]   10/25/2019 11:00 AM MOE Britton [facility] SFD

## 2019-10-11 NOTE — PROGRESS NOTES
Johana Ding  : 1970  Primary: [employer]  Secondary:  [address]  at [facility]  [address], [address], [place], [facility]  [phone]   [phone]        OUTPATIENT PHYSICAL THERAPY: Daily Treatment Note 10/11/2019    ICD-10: Treatment Diagnosis: Unsteadiness on feet (R26.81)  Other lack of coordination (R27.8)  Repeated falls (R29.6)  TREATMENT PLAN:  Effective Dates: 2019 TO 10/29/2019 (90 days). Frequency/Duration: 2 times a week for 90 Day(s)    Pre-treatment Symptoms/Complaints:  Patient reports doing the same overall. Wife reports that yesterday the aid was pulling on his R arm, leg, and neck and didn't know how to help him correctly. Said she had to step in a help. Pain: Initial:   6/10  Post Session:  6/10   Medications Last Reviewed:  10/11/2019  Updated Objective Findings:  None Today  TREATMENT:     THERAPEUTIC ACTIVITY: ( 25 minutes): Therapeutic activities per grid below to improve mobility, strength and coordination. Date:  10/2/19 Date:  10/4/19 Date:  10-9-19 Date:  10/11/19   Activity/Exercise Parameters Parameters   Parameters   Tone reduction techniques     Attempted weight bearing, pressure to the tendons, estim to fatigue the muscles- no change in tremors with any technique   Transfers - to and from mat   Stand pivot transfers with 2 person assist, min A using rolling walker  Stand pivot transfer with min A x 2 persons using a rolling walker   Bed mobility   Max A sit to sidelying and sidelying to sit     Sit to  bars x 3 reps with mod A Min A x 2 persons, 5 reps with rolling walker X 4 with mod A Min A x 2 persons, 5 reps   Static standing 2 x 30'- patient reported static standing is more difficult than ambulating. Standing marching       Ambulation  3 x 10' in bars with min A x 2 persons and wheelchair follow for safety. 3 x 20' with rolling walker, 2 person assist +wheelchair follow.   Min A 3 x 5 ft with min A and blocking R knee with steps  With rolling walker 2 x 10' with decreased ability to advance R leg today and increased unsteadiness. Standing in ll bars - tapping foot forward and back        Standing in ll bars - tapping foot to side and back        Standing weight shifts           THERAPEUTIC EXERCISE: ( 15 minutes):  Exercises per grid below to improve mobility, strength and coordination. Date:  10/2/19 Date:  10/4/19 Date:  10/9/19 Date:  10/11/19   Activity/Exercise Parameters Parameters  Parameters Parameters   Single knee to chest        Hamstring stretch  Seated 3 x 30 sec B- patient reports more neck pain with leg stretching Seated 3 x 20 sec B -through small ROM Seated 3x30 sec B through small ROM with noted pain in R LE 3 x 30 sec L sitting in WC, siginficant increase in tremors on R and therefore unable   Lower trunk rotation        Long arc Quads Attempted active and active assist- increased tone and tremors and poor ability to move through the range Alternating sitting EOM 2 x 10 reps 2 x 5 with mod A on R X 5 reps left- unable on R today   Side lying - hip abduction   3 x 5 rep R     Side lying - R hip flexor stretch        Seated at edge of mat marching  Marching 3 x 10 reps   Attempted but patient unable to clear the floor with either foot   calft stretch X 30 sec B- patient reports mor neck pain with stretching   X 30 sec L  Unable on R due to increased tremors   Ankle pumps  X 10 reps alternating seated EOB X 10 B alternating    Manual stretch to neck flexors       Cervical chin tuck isometrics       Cervical ROM   X 2 or 3 each direction before he asked to stop due to pain. [employer] Portal  Treatment/Session Summary:  Patient had decreased ability to ambulate today due to poor ability to advance R LE. Decreased motor control of the R LE today. Ambulation was unsafe and therefore discontinued. Minimal progress to this point.       · Response to Treatment:  no adverse reactions. · Communication/Consultation:  None today   · Equipment provided today:  None today  · Recommendations/Intent for next treatment session: Next visit will focus on improving functional mobility and balance.       Total Treatment Billable Duration:  40 minutes   PT Patient Time In/Time Out  Time In: 0800  Time Out: Lewis Kelly DPT    Future Appointments   Date Time Provider Sara Abdi   10/14/2019  8:00 [facility]   10/14/2019  9:30 AM Liz Rand DPT [facility]   10/18/2019  8:45 AM Niles Das SLP [facility]   10/18/2019  9:30 AM Ilir Schwartz, OTD, OTR/L [facility]   10/18/2019 10:15 AM Myranda Huitron PTA [facility]   10/23/2019  9:30 AM Niles Das SLP [facility]   10/23/2019 10:15 AM Myranda Huitron PTA [facility]   10/25/2019  9:30 AM [facility]   10/25/2019 10:15 AM Niles Das SLP [facility] D   10/25/2019 11:00 AM Anel Byrnes PTA [facility]

## 2019-10-14 ENCOUNTER — HOSPITAL ENCOUNTER (OUTPATIENT)
Dept: PHYSICAL THERAPY | Age: 49
Discharge: HOME | End: 2019-10-14
Payer: COMMERCIAL

## 2019-10-14 ENCOUNTER — APPOINTMENT (OUTPATIENT)
Dept: PHYSICAL THERAPY | Age: 49
End: 2019-10-14
Payer: COMMERCIAL

## 2019-10-14 PROCEDURE — 97110 THERAPEUTIC EXERCISES: CPT

## 2019-10-14 PROCEDURE — 92507 TX SP LANG VOICE COMM INDIV: CPT

## 2019-10-14 PROCEDURE — 97530 THERAPEUTIC ACTIVITIES: CPT

## 2019-10-14 NOTE — PROGRESS NOTES
Chasity Males  : 1970  Primary: [employer]  Secondary:  [address] at [facility]  [address], [address] [place] [address] Drive, [place] [address] [facility]  [phone]   [phone]         OUTPATIENT PHYSICAL THERAPY:Progress Report 10/14/2019    ICD-10: Treatment Diagnosis: Unsteadiness on feet (R26.81)  Other lack of coordination (R27.8)  Repeated falls (R29.6)  Precautions/Allergies:   Patient has no allergy information on record. none per wife report  TREATMENT PLAN:  Effective Dates: 2019 TO 10/29/2019 (90 days). Frequency/Duration: 2 times a week for 90 Day(s) MEDICAL/REFERRING DIAGNOSIS:  TBI (traumatic brain injury) [facility]) [S06.9X9A]   DATE OF ONSET: 2016  REFERRING PHYSICIAN:  Selina Velez MD Orders: evaluate and treat   RETURN PHYSICIAN APPOINTMENT: unknown      ASSESSMENT (Date: 10/14/19):  Mr. Radha Devine has been seen in physical therapy for 17 visits since 19. He has currently met 1/3 of his short term goals and 0/5 of his long term goals. In therapy, we have attempted stretching, strengthening, balance, ambulation, coordination, and functional training. His progress fluctuates depending on how he is feeling that day and he is made very little progress overall. He now has to wear dark glasses to therapy because he reports the lights make him worse. He reported that one day after looking at a colored dot for less than 10 seconds, he threw up blood later that night. He is most limited by his tremors and further progress is unlikely if there is no relief from the full body tremors, worse on the R side. No tone reducing techniques have worked. He has significant neck and back pain but is unable to tolerate any therapy targeting these areas. His plan of care runs for 2 more weeks. If no change is made at that time, I will recommend discharge due to lack of progress. PROBLEM LIST (Impacting functional limitations):  1.  Decreased Strength  2. Decreased ADL/Functional Activities  3. Decreased Transfer Abilities  4. Decreased Ambulation Ability/Technique  5. Decreased Balance  6. Decreased Activity Tolerance  7. Decreased [place] with Home Exercise Program INTERVENTIONS PLANNED:  1. Balance Exercise  2. Bed Mobility  3. Family Education  4. Gait Training  5. Home Exercise Program (HEP)  6. Neuromuscular Re-education/Strengthening  7. Therapeutic Activites  8. Therapeutic Exercise/Strengthening  9. Transfer Training     GOALS: (Goals have been discussed and agreed upon with patient.)  Short-Term Functional Goals: Time Frame: 45 days  1. Patient will be compliant with HEP. MET  2. Patient will have an increase on the AMPAC to 14/24 in order to improve functional mobility. NOT MET  3. Patient will demonstrate a stand pivot transfer with supervision in order to improve home mobility. NOT MET  Discharge Goals: Time Frame: 90 days  1. Patient will be independent with HEP. NOT MET  2. Patient will have an increase on the AMPAC to 18/24 in order to improve functional mobility. NOT MET  3. Patient will demonstrate ambulating 48' with the LRAD and supervision in order to improve ability to complete ADLs. NOT MET  4. Patient will demonstrate a car transfer with supervision in order to decrease assistance needed from wife. NOT MET  5. Patient will perform sit to supine with modified independence in order to improve bed mobility. NOT MET    Outcome Measure:             [address] [facility] [place] [address] [address] AM-PAC 6 Clicks         Basic Mobility Inpatient Short Form  How much difficulty does the patient currently have. .. Unable A Lot A Little None   1. Turning over in bed (including adjusting bedclothes, sheets and blankets)? [] 1   [x] 2   [] 3   [] 4   2. Sitting down on and standing up from a chair with arms ( e.g., wheelchair, bedside commode, etc.)   [] 1   [] 2   [x] 3   [] 4   3. Moving from lying on back to sitting on the side of the bed?    [] 1   [x] 2 [] 3   [] 4          How much help from another person does the patient currently need. .. Total A Lot A Little None   4. Moving to and from a bed to a chair (including a wheelchair)? [] 1   [x] 2   [] 3   [] 4   5. Need to walk in hospital room? [] 1   [x] 2   [] 3   [] 4   6. Climbing 3-5 steps with a railing? [] 1   [x] 2   [] 3   [] 4   © 2007, Trustees of [address] [place] [address] [address] [address], under license to [employer]. All rights reserved     Score:  Initial: 11 Most Recent: 13 (Date: 10/14/19 )   Interpretation of Tool:  Represents activities that are increasingly more difficult (i.e. Bed mobility, Transfers, Gait). UPDATED OBJECTIVE FINDINGS:       Mental Status:          alert  Palpation:          increased tone R UE and LE. Coordination:       impaired LUE, impaired LLE, impaired RUE and impaired RLE  Balance:          decreased static balance, decreased dynamic balance and fair sitting and  Poor standing balance    Lower Extremity:    Strength PROM   Action R L R  L    Hip Flexion 3- 3     Hip Extension NT NT     Hip Abduction unable NT     Hip Adduction NT NT     Knee Flexion unable 3     Knee Extension 3- 3     Dorsi Flexion unable 3-     Plantar Flexion       Inversion       Eversion                 Functional Mobility:         Gait/Ambulation:  Ambulates with rolling walker, forward flexed trunk, full body tremors, decreased step length, decreased gait speed, 15' min to mod A with 2 person assist for safety           Transfers:  Min A x 2 person, stand pivot transfer with rolling walker.   Very unsteady        Bed Mobility:  Mod A, full body tremors throughout         Stairs:  NT        Wheelchair:  dependent     Ambulatory/Rehab Services H2 Model Falls Risk Assessment    Risk Factors:       (4)  Confusion/Disorientation/Impulsivity       (2)  Symptomatic Depression       (1)  Gender [Male]       (2)  Any administered antiepileptics/anticonvulsants       (5)  History of Recent Falls [w/in 3 months] Ability to Rise from Chair:       (4)  Unable to rise without assistance    Falls Prevention Plan:       Physical Limitations to Exercise (specify):  using a wheelchair       Mobility Assistance Device (specify):  wheelchair and walker   Total: (5 or greater = High Risk): 18    ©2010 [facility] of [employer]. [place] States Patent #[id number]. Federal Law prohibits the replication, distribution or use without written permission from [facility] of [employer] Necessity:   · Patient is expected to demonstrate progress in strength, range of motion, balance and functional technique to increase independence with ADLs. · Patient demonstrates fair rehab potential due to higher previous functional level. Reason for Services/Other Comments:  · Patient continues to require modification of therapeutic interventions to increase complexity of exercises. Total Duration:  PT Patient Time In/Time Out  Time In: 0930  Time Out: 1009        Rehabilitation Potential For Stated Goals: [address] therapy, I certify that the treatment plan above will be carried out by a therapist or under their direction.   Thank you for this referral,  Ildefonso Kingsley DPT    Referring Physician Signature: Dr. Lynette Kelly

## 2019-10-14 NOTE — PROGRESS NOTES
Janet Barfield  : 1970  Primary: [facility]  Secondary:  [address]  at [facility]  [address], [address], [place], [facility]  [phone]   [phone]        OUTPATIENT PHYSICAL THERAPY: Daily Treatment Note 10/14/2019    ICD-10: Treatment Diagnosis: Unsteadiness on feet (R26.81)  Other lack of coordination (R27.8)  Repeated falls (R29.6)  TREATMENT PLAN:  Effective Dates: 2019 TO 10/29/2019 (90 days). Frequency/Duration: 2 times a week for 90 Day(s)    Pre-treatment Symptoms/Complaints:  Patient reports doing the fine. No change over the weekend. Some movements at home are getting harder. Pain: Initial:   6/10  Post Session:  8/10   Medications Last Reviewed:  10/14/2019  Updated Objective Findings:  See evaluation note from today  TREATMENT:     THERAPEUTIC ACTIVITY: ( 30 minutes): Therapeutic activities per grid below to improve mobility, strength and coordination. Date:  10/4/19 Date:  10-9-19 Date:  10/11/19 Date:  10/4/19   Activity/Exercise Parameters   Parameters Parameters   Tone reduction techniques    Attempted weight bearing, pressure to the tendons, estim to fatigue the muscles- no change in tremors with any technique    Transfers - to and from mat  Stand pivot transfers with 2 person assist, min A using rolling walker  Stand pivot transfer with min A x 2 persons using a rolling walker Stand pivot transfers with mod A x 2 persons with rolling walker. Bed mobility  Max A sit to sidelying and sidelying to sit      Sit to stand Min A x 2 persons, 5 reps with rolling walker X 4 with mod A Min A x 2 persons, 5 reps Mod A to max A x 2 persons today x 5 reps. Increased verbal cuing needed for safety awareness   Static standing       Standing marching       Ambulation  3 x 20' with rolling walker, 2 person assist +wheelchair follow.   Min A 3 x 5 ft with min A and blocking R knee with steps  With rolling walker 2 x 10' with decreased ability to advance R leg today and increased unsteadiness. With rolling walker, 2 person assist + wheelchair follow and 1 person managing walker so 4 persons total.  Max A 3 x 15'. Poor ability of feet to keep up with body. Standing in ll bars - tapping foot forward and back        Standing in ll bars - tapping foot to side and back        Standing weight shifts           THERAPEUTIC EXERCISE: ( 9 minutes):  Exercises per grid below to improve mobility, strength and coordination. Date:  10/4/19 Date:  10/9/19 Date:  10/11/19 Date:  10/14/19   Activity/Exercise Parameters  Parameters Parameters Parameters   Single knee to chest        Hamstring stretch  Seated 3 x 20 sec B -through small ROM Seated 3x30 sec B through small ROM with noted pain in R LE 3 x 30 sec L sitting in WC, siginficant increase in tremors on R and therefore unable 2 x 20 sec B in sitting with empty end feel   Lower trunk rotation        Long arc Quads Alternating sitting EOM 2 x 10 reps 2 x 5 with mod A on R X 5 reps left- unable on R today 3 x 2 reps B with significant increase in tremors. Patient report increased pain in R side of body with L and R kicking   Side lying - hip abduction  3 x 5 rep R      Side lying - R hip flexor stretch        Seated at edge of mat marching Marching 3 x 10 reps   Attempted but patient unable to clear the floor with either foot    calft stretch   X 30 sec L  Unable on R due to increased tremors X 30 sec B with empty end feel   Ankle pumps X 10 reps alternating seated EOB X 10 B alternating     Manual stretch to neck flexors       Cervical chin tuck isometrics       Cervical ROM  X 2 or 3 each direction before he asked to stop due to pain. [employer]  Treatment/Session Summary:  Patient with little progress. Difficulty advaning R LE again. See progress note from today. · Response to Treatment:  no adverse reactions.   · Communication/Consultation:  None today   · Equipment provided today:  None today  · Recommendations/Intent for next treatment session: Next visit will focus on improving functional mobility and balance.       Total Treatment Billable Duration:  39 minutes   PT Patient Time In/Time Out  Time In: 0930  Time Out: [address] [place], DPT    Future Appointments   Date Time Provider Sara Abdi   10/18/2019  8:45 AM Dominique Das SLP [facility]   10/18/2019  9:30 AM JADA Garcia, OTR/L [facility]   10/18/2019 10:15 AM Mckayla Barraza PTA [facility]   10/23/2019  9:30 AM Dominique Das SLP [facility]   10/23/2019 10:15 AM Mckayla Barraza PTA [facility]   10/25/2019  9:30 AM [facility]   10/25/2019 10:15 AM Dominique Das SLP [facility]   10/25/2019 11:00 AM Mateo Grimaldo PTA [facility]

## 2019-10-14 NOTE — PROGRESS NOTES
Jeane Lopez  : 1970  Primary: [facility]  Secondary:  [address]  at [facility]  [address], [address], [place], [facility]  [phone]   [phone]        OUTPATIENT SPEECH LANGUAGE PATHOLOGY: Daily Note: 3    ICD-10: Treatment Diagnosis: cognitive communication deficits R 41.841  REFERRING PHYSICIAN: Charlotte Reynoso MD MD Orders: speech evaluate and treat  Return Physician Appointment:    PAST MEDICAL HISTORY: TBI, sleep apnea  MEDICAL/REFERRING DIAGNOSIS: TBI (traumatic brain injury) ([facility] [address] [S06.9X9A]  DATE OF ONSET:   PRIOR LEVEL OF FUNCTION: with spouse and children   PRECAUTIONS/ALLERGIES: NKDA   ASSESSMENT:  Pt recalled going to the [email or web address] this weekend but couldn't remember if it was either Friday or Saturday. His wife said it was on Saturday. He was unable to recall if they brought anything. His wife reported they are using a journal which helps sometimes as he remembers more things with it. She reported they go over it a couple of times a day. She reported the pt's memory varies a lot as sometimes it's very good and sometime's very bad. She reported the pt has a nursing aide that comes out 5 days weekly. However, she isn't happy with them as they aren't providing adequate services. Therefore, she had a meeting with the person in charge on Friday and was told nothing could be done about it. Pt missed the first item with with identifying inconsistencies in sentences as he didn't comprehend the task even with the directions repeated. He missed one of the other items due to not understanding the sentence. Patient will benefit from skilled intervention to address the above impairments. ?????? ? ? This section established at most recent assessment??????????  PROBLEM LIST (Impairments causing functional limitations):  1. Decreased cognition  2.  Anomia   GOALS: (Goals have been discussed and agreed upon with patient.)  SHORT-TERM FUNCTIONAL GOALS: Time Frame: 3 months   1. Pt will name pictures with 80% accuracy. 2. Pt will complete word finding tasks with 80% accuracy. 3. Pt will complete convergent/divergent naming tasks with 80% accuracy. 4. Pt will state his address with only min cues needed. 5. Pt will complete STM tasks with 80% accuracy. 6. Pt will participate in a full cognitive assessment x1. DISCHARGE GOALS: Time Frame: 4-5 months   1. Increased memory and expressive language skills needed for highest level of independent functioning. REHABILITATION POTENTIAL FOR STATED GOALS: FairPLAN OF CARE:  INTERVENTIONS PLANNED: (Benefits and precautions of therapy have been discussed with the patient.)  1. Cognitive tasks  2. Speech tasks  TREATMENT PLAN EFFECTIVE DATES: 7/31/2019 TO 10/30/2019 (90 days). FREQUENCY/DURATION: Continue to follow patient 2 times a week for 90 days to address above goals. Regarding [address] [place] [address] Road therapy, I certify that the treatment plan above will be carried out by a therapist or under their direction. Thank you for this referral,  Treasure Vernon, [facility], [facility], [id number] [place] [address]                     Referring Physician Signature: Jeannette Grayson MD     Date      SUBJECTIVE:  Pt cooperative. Spouse and interpretors present. Present Symptoms: decreased cognition s/p TBI in 2016      Current Medications: see hard chart    Date Last Reviewed: 10/9/19  Social History/Home Situation: residing with spouse and their 5 children      Work/Activity History:  for [employer]     OBJECTIVE:  Objective Measure: Tool Used: National Outcomes Measurement System: Functional Communication Measures: SPOKEN LANGUAGE EXPRESSION  Score:  Initial: 5 Most Recent: 5 (Date: 10/2/19 )   Interpretation of Tool: This measure describes the change in functional communication status subsequent to speech-language pathology treatment of patients who have spoken language expression deficits.   o Level 1:  The individual attempts to speak, but verbalizations are not meaningful to familiar or unfamiliar communication partners at any time. o Level 2:  The individual attempts to speak, although few attempts are accurate or appropriate. The communication partner must assume responsibility for structuring the communication exchange, and with consistent and maximal cueing, the individual can only occasionally produce automatic and/or imitative words and phrases that are rarely meaningful in context.  o Level 3:  The communication partner must assume responsibility for structuring the communication exchange, and with consistent and moderate cueing, the individual can produce words and phrases that are appropriate and meaningful in context.  o Level 4:  The individual is successfully able to initiate communication using spoken language in simple, structured conversations in routine daily activities with familiar communication partners. The individual usually requires moderate cueing, but is able to demonstrate use of simple sentences (i.e., semantics, syntax, and morphology) and rarely uses complex sentences/messages. o Level 5:  The individual is successfully able to initiate communication using spoken language in structured conversations with both familiar and unfamiliar communication partners. The individual occasionally requires minimal cueing to frame more complex sentences in messages. The individual occasionally self-cues when encountering difficulty. o Level 6:  The individual is successfully able to communicate in most activities, but some limitations in spoken language are still apparent in vocational, avocational, and social activities. The individual rarely requires minimal cueing to frame complex sentences. The individual usually self-cues when encountering difficulty. o Level 7:   The individuals ability to successfully and independently participate in vocational, avocational, and social activities is not limited by spoken language skills. Independent functioning may occasionally include use of self-cueing. Score Level 7 Level 6 Level 5 Level 4 Level 3 Level 2 Level 1   Modifier CH CI CJ CK CL CM CN     Mental Status:  Alert    Neuro-Linguistics:  Identifying inconsistencies in sentences: 60%. Correcting the inconsistencies: 60%. Cognitive Skills Activities: Activities/Procedures listed utilized to improve and/or restore cognitive function as related to thought organization. Required moderate verbal cueing to improve improve recall of information. __________________________________________________________________________________________________  History of Present Injury/Illness (Reason for Referral):    Treatment Assessment:   . Progression/Medical Necessity:   · Skilled intervention continues to be required due to decreased communication with family/caregivers and decreased cognitive skills. Compliance with Program/Exercises: Will assess as treatment progresses}. Reason for Continuation of Services/Other Comments:  · Patient continues to require skilled intervention due to decreased cognition, anomia. Recommendations/Intent for next treatment session: \"Treatment next visit will focus on cognitive, speech tasks\".      Total Treatment Duration:  Time In: 0815  Time Out: [address] [place] [address] 43., CCC-SLP

## 2019-10-17 NOTE — PROGRESS NOTES
OT Note ([phone]): Patient's  office [facility] called asking about how to help Mr. Urbano Valente to obtain a w/c. This therapist stated this therapist has sent progress notes to MD with recommendations for w/c and Caden Bañuelos stated they have requested those records. This therapist also stated that this therapist has spoken with Mr. Urbano Valente' worker's  and could also call the doctor with those recommendations.

## 2019-10-18 ENCOUNTER — HOSPITAL ENCOUNTER (OUTPATIENT)
Dept: PHYSICAL THERAPY | Age: 49
Discharge: HOME | End: 2019-10-18
Payer: COMMERCIAL

## 2019-10-18 PROCEDURE — 97530 THERAPEUTIC ACTIVITIES: CPT

## 2019-10-18 PROCEDURE — 92507 TX SP LANG VOICE COMM INDIV: CPT

## 2019-10-18 PROCEDURE — 97542 WHEELCHAIR MNGMENT TRAINING: CPT

## 2019-10-18 NOTE — PROGRESS NOTES
Janet Barfield  : 1970  Primary: [employer]  Secondary:  [address]  at [facility]  [address], [address], [place] [address] [address] [facility]  [phone]   [phone]        OUTPATIENT PHYSICAL THERAPY: Daily Treatment Note 10/18/2019    ICD-10: Treatment Diagnosis: Unsteadiness on feet (R26.81)  Other lack of coordination (R27.8)  Repeated falls (R29.6)  TREATMENT PLAN:  Effective Dates: 2019 TO 10/29/2019 (90 days). Frequency/Duration: 2 times a week for 90 Day(s)    Pre-treatment Symptoms/Complaints:  Patient reports some pain in back and down R LE. Pt's wife stated that his blood sugars have been better and are not as elevated. Pain: Initial:   7/10 back and down R LE Post Session:  Not rated   Medications Last Reviewed:  10/18/2019  Updated Objective Findings:  None Today  TREATMENT:     THERAPEUTIC ACTIVITY: ( 38 minutes): Therapeutic activities per grid below to improve mobility, strength and coordination. Date:  10/4/19 Date:  10-9-19 Date:  10/11/19 Date:  10/4/19 Date:  10/18/19   Activity/Exercise Parameters   Parameters Parameters    Tone reduction techniques    Attempted weight bearing, pressure to the tendons, estim to fatigue the muscles- no change in tremors with any technique     Transfers - to and from mat  Stand pivot transfers with 2 person assist, min A using rolling walker  Stand pivot transfer with min A x 2 persons using a rolling walker Stand pivot transfers with mod A x 2 persons with rolling walker. Stand pivot transfer with min-mod A X 2 with RW   Bed mobility  Max A sit to sidelying and sidelying to sit    Supine to sit with min-mod A X 2    Supine to sidelying with mod A    Sidelying to sit with mod A-max A X1   Sit to stand Min A x 2 persons, 5 reps with rolling walker X 4 with mod A Min A x 2 persons, 5 reps Mod A to max A x 2 persons today x 5 reps.   Increased verbal cuing needed for safety awareness X 4 with min A-mod A X 2   Static standing        Standing marching        Ambulation  3 x 20' with rolling walker, 2 person assist +wheelchair follow. Min A 3 x 5 ft with min A and blocking R knee with steps  With rolling walker 2 x 10' with decreased ability to advance R leg today and increased unsteadiness. With rolling walker, 2 person assist + wheelchair follow and 1 person managing walker so 4 persons total.  Max A 3 x 15'. Poor ability of feet to keep up with body. With rolling walker, 2 person assist and wheelchair follow; 25' X 4; some assist needed for rolling walker management and erect posture   Standing in ll bars - tapping foot forward and back         Standing in ll bars - tapping foot to side and back         Standing weight shifts            THERAPEUTIC EXERCISE: ( 4 minutes):  Exercises per grid below to improve mobility, strength and coordination. Date:  10/4/19 Date:  10/9/19 Date:  10/11/19 Date:  10/14/19 Date:   10/18/19   Activity/Exercise Parameters  Parameters Parameters Parameters    Single knee to chest         Hamstring stretch  Seated 3 x 20 sec B -through small ROM Seated 3x30 sec B through small ROM with noted pain in R LE 3 x 30 sec L sitting in WC, siginficant increase in tremors on R and therefore unable 2 x 20 sec B in sitting with empty end feel    Lower trunk rotation         Long arc Quads Alternating sitting EOM 2 x 10 reps 2 x 5 with mod A on R X 5 reps left- unable on R today 3 x 2 reps B with significant increase in tremors.   Patient report increased pain in R side of body with L and R kicking    Side lying - hip abduction  3 x 5 rep R       Side lying - R hip flexor stretch         Seated at edge of mat marching Marching 3 x 10 reps   Attempted but patient unable to clear the floor with either foot     calf stretch   X 30 sec L  Unable on R due to increased tremors X 30 sec B with empty end feel Attempted X2 B, pt exhibited significant increased pain on R LE    Ankle pumps X 10 reps alternating seated EOB X 10 B alternating      Manual stretch to neck flexors        Cervical chin tuck isometrics        Cervical ROM  X 2 or 3 each direction before he asked to stop due to pain. [facility]  · Treatment/Session Summary:  Patient exhibited increased tolerance for ambulation at today's visit. Pt with slight increased andrea during walking and less assist needed. Pt required some assist with rolling walker management and tactile cues to remind of erect posture. · Response to Treatment:  no adverse reactions. · Communication/Consultation:  None today   · Equipment provided today:  None today  · Recommendations/Intent for next treatment session: Next visit will focus on improving functional mobility and balance.       Total Treatment Billable Duration:  42 minutes   PT Patient Time In/Time Out  Time In: 1018  Time Out: [address] [place] [address], [address] [place] [address]    Future Appointments   Date Time Provider Sara Abdi   10/23/2019  9:30 AM [facility]   10/23/2019 10:15 AM MOE [facility] D   10/25/2019  9:30 AM JADA Barth[facility]TR/L [facility]   10/25/2019 10:15 AM Wili Das, SLP [facility]   10/25/2019 11:00 AM Jacky Oreilly PTA [facility] Karthik Arce

## 2019-10-18 NOTE — PROGRESS NOTES
Suzanneradha Jimmie  : 1970  Primary: [employer]  Secondary:  [address] at [facility]  [address], [address], [place], [facility]  [phone]   [phone]      OUTPATIENT OCCUPATIONAL THERAPY: Daily Treatment Note 10/18/2019  Visit Count:  44    ICD-10: Treatment Diagnosis:  Unspecified lack of coordination (R27.9); Dependence on wheelchair (Z99.3); History of falling (Z91.81)  Precautions/Allergies:   Patient has no allergy information on record. TREATMENT PLAN:  Effective Dates: 2019 TO 11/3/2019 (90 days). Frequency/Duration: 2 times a week for 90 Day(s) - plan for once a week over subsequent visits. Pre-treatment Symptoms/Complaints: Patient/wife state he sees the physiatrist on .    Pain: Initial: Pain Intensity 1: 7  Pain Location 1: Back  Post Session:  same/10   Medications Last Reviewed:  10/18/2019   Updated Objective Findings:    10/11/19:  Deion Mcmullen TRAINING (Permobil M3 power w/c):  [x] YES [] NO Cause and effect concepts while in the wheelchair (i.e. Activating a switch causes the wheelchair to move)   [x] YES [] NO Stop and go concepts while in the wheelchair (i.e. \"stop and go\" verbal instructions, or consistently stopping for objects)   [x] YES [] NO Directional concepts while in the wheelchair (i.e. moving the joystick in different directions to move the wheelchair in different directions)   [x] YES [] NO Ability to follow directions: (i.e. Following varying verbal commands in a safe and timely manner)   [x] YES [] NO Adequate visual functioning to safely drive indoors (i.e. Visual attention to environment and ability to avoid obstacles)   [x] YES [] NO Adequate problem solving ability (i.e. Maneuver power wheelchair to designated destination without verbal cues)   [x] YES [] NO Ability to use access method with adequate activation, sustained contact and release (i.e. Able to access switch, control contact, and release when ready to stop wheelchair)   [x] YES [] NO Ability to change drives and or speeds as appropriate for environment (i.e. Decreasing speed in high traffic areas)   [x] YES [] NO Client ready to complete outdoor portion of power wheelchair mobility assessment   [x] YES [] NO Small space maneuverability (room)   [x] YES [] NO Large space maneuverability (hallway)   [x] YES [] NO Accessing Doorways   COMMENTS: Able to drive w/c in reverse with minimal cues        OUTDOOR TRAINING:   [x] YES [] NO Adequate safety judgment while outdoors (i.e. Checking for traffic at crosswalks, parking wheelchair within safe distances of objects)   [x] YES [] NO Stop and go concepts while in the wheelchair (i.e. \"stop and go\" verbal instructions, or consistently stopping for Objects)   [x] YES [] NO Directional concepts while in the wheelchair (i.e. moving the joystick in different directions to move the wheelchair in different directions)   [x] YES [] NO Ability to follow directions: (i.e. Following varying verbal commands in a safe and timely manner)   [x] YES [] NO Adequate visual functioning to safely drive outdoors (i.e. Visual attention to environment and ability to avoid obstacles)   [x] YES [] NO Adequate problem solving ability (i.e. Using curb cutouts when available, shortest/safest route without verbal cues)   [x] YES [] NO Ability to use access method with adequate activation, sustained contact, and release (i.e. Demonstrate safe endurance while operating controls, motor control when maneuvering over terrain)   [x] YES [] NO Ability to change drives and or speeds as appropriate for environment (i.e. Decreasing speed when approaching curbs)   [x] YES [] NO Door access   [x] YES [] NO Curb cutout   [x] YES [] NO Sidewalk with irregularities   [x] YES [] NO Crosswalk   [x] YES [] NO Parking within two feet of target   [x] YES [] NO Inclines (up and down)   [x] YES [] NO Endurance for > 25 feet (power wheelchair control) COMMENTS:      Mat evaluation (10/4/19): Hip width: 15\"; Upper leg length: 18\" (R/L); lower leg length: 17\" (R/L); Shoulder: R: 22\"; L\": 24\"; Top of head: 33\"; Seat to arm: R: 9\"; L: 13\" ; shoulder to shoulder: 20\"; chest width: 13\" ; chest depth: 9\" ; lower arm length: 12\";   PRESSURE MAPPING Maximum Pressure Outcomes   Pressure Mapping #1 (seated edge of mat) 256 mmHG over L/R ischial tuberosities         RECOMMENDATIONS:        TREATMENT:   Therapeutic Activity: (    7 minutes):  Therapeutic activities including Bed transfers, Chair transfers and w/c transfers to improve mobility, strength, balance and coordination.  Required moderate   to promote static and dynamic balance in sitting and promote coordination of bilateral, upper extremity(s), trunk. Patient assisted to and from the w/c to mat/mat to w/c with minimal to moderate assistance. He pivoted to the R to transfer to the L due to shaking in his leg. P  FUNCTIONAL MOBILITY:   Transfers:   Wheelchair to Wheelchair: Stand pivot transfer to the R with minimal to moderate assistance x 2 reps utilizing rolling walker. Sit to Stand: Minimal assistance. Functional weight shift: Maximal assistance to complete functional weight-shifts. Wheelchair Management and Training: (38 minutes): Procedure(s) utilized to improve and/or restore functioning as related to power wheelchair mobility and seating/positioning in power wheelchair. Mr. Luz Lobo demonstrated ability to complete full power tilt as needed for scooting back into w/c for optimal pressure re-distribution. He was able to lower/raise elevating leg rests while in full tilt as needed to manage BLE edema and place BLE above the heart to do so. Mr. Luz Lobo states his hip/back felt better in this position. Mr. Luz Lobo also completed power recline independently  And states his shoulder (R) felt less pain when doing so.   Lastly he independently placed the power w/c in anterior tilt in preparation for functional transfers and lifted the foot plates with reacher in preparation for functional transfer. Therapeutic Exercise: (  0 minutes):  Exercises per grid below to improve mobility, strength, balance and coordination. Required moderate visual, verbal, manual and tactile cues to promote proper body alignment, promote proper body posture, promote proper body mechanics and promote proper body breathing techniques. Progressed resistance, range, repetitions and complexity of movement as indicated. Date:  10/2 Date:   Date:     Activity/Exercise Parameters Parameters Parameters   Elbow flexion/extension 1-2 sets 5-10 reps AROM      Shoulder elevation 1-2 sets 5-10 reps AROM (reports R neck pain with this)     Shoulder ER 1-2 sets 2-3 reps. (reports neck pain with this)     Digit abduction/adduction 1-2 sets 5-10 reps AROM L hand                         Med[employer] Portal  Treatment/Session Summary:  Mr. Monique Hunter independently operated power seat functions in group 3 power w/c including power tilt, recline and elevating leg rests. He reported less R shoulder pain with recline function. Elevating leg rests would help manage BLE edema. Power tilt due would help re-distribute pressure from his bottom as he can not carry out independent functional weightshifts and help him to more independently scoot back in the w/c. He would benefit from a group 3 power w/c with these power seating functions to maximize his overall independence with ADL/instrumental ADL/functional mobility for ADL. Continue OT per plan of care. · Response to Treatment:  tolerated without complications. · Communication/Consultation:  Progress report sent to MD  · Equipment provided today:  given long handled shoe horn, button hook, reacher, and sock aide  · Recommendations/Intent for next treatment session: Next visit will focus on advancement to more challenging activities. .    Total Treatment Billable Duration:  38 minutes  OT Patient Time In/Time Out  Time In: 0930  Time Out: 1015  Rafat Willson, OTR/L    Future Appointments   Date Time Provider Sara Abdi   10/23/2019  9:30 AM Rachel Das SLP [facility]   10/23/2019 10:15 AM Jami [facility]   10/25/2019  9:30 AM JADA Cloe, OTR/L [facility]   10/25/2019 10:15 AM Rachel Das SLP [facility]   10/25/2019 11:00 AM [facility] Jayson Brito

## 2019-10-18 NOTE — PROGRESS NOTES
Deysi Lewis  : 1970  Primary: [employer]  Secondary:  [address]  at [facility]  [address], [address], [address] [place], [facility] [address] [facility]  [phone]   [phone]        OUTPATIENT SPEECH LANGUAGE PATHOLOGY: Progress Report    ICD-10: Treatment Diagnosis: cognitive communication deficits R 41.841  REFERRING PHYSICIAN: Vaibhav Espinoza MD MD Orders: speech evaluate and treat  Return Physician Appointment:    PAST MEDICAL HISTORY: TBI, sleep apnea  MEDICAL/REFERRING DIAGNOSIS: TBI (traumatic brain injury) ([facility] [address] [S06.9X9A]  DATE OF ONSET:   PRIOR LEVEL OF FUNCTION: with spouse and children   PRECAUTIONS/ALLERGIES: NKDA   ASSESSMENT:  Pt has attended 17 sessions due to decreased cognition. Pt's spouse reported the pt has an appointment with a neurologist  at [place]. She reported she has noticed the pt is having a harder time pronouncing words, getting words out and isn't talking as much. She reported the pt is using more one word answers vs elaborating more. She reported one of their children commented that the pt is requiring increased processing time also. She reported his blood sugars have been more stable but he becomes more sleepy once she injects the insulin. Noted he is taking 2 new DM medications as of 10/10. She reported Dr. Rolando Keita warned her that some of the medications may make him sleepy. She reported they have an appointment with the neuropyschiatrist 10/24 and Dr. Rolando Keita (PCP) 10/28/19. The pt continues to exhibit deficits with processing and cognition. Patient will benefit from skilled intervention to address the above impairments. ?????? ? ? This section established at most recent assessment??????????  PROBLEM LIST (Impairments causing functional limitations):  1. Decreased cognition  2. Anomia   GOALS: (Goals have been discussed and agreed upon with patient.)  SHORT-TERM FUNCTIONAL GOALS: Time Frame: 3 months   1.  Pt will name pictures with 80% accuracy. Goal met. 2. Pt will complete word finding tasks with 80% accuracy. Goal partially met. 3. Pt will complete convergent/divergent naming tasks with 80% accuracy. Goal partially met. 4. Pt will state his address with only min cues needed. Goal not met. 5. Pt will complete STM tasks with 80% accuracy. Goal not met. 6. Pt will participate in a full cognitive assessment x1. Gaol ongoing. DISCHARGE GOALS: Time Frame: 4-5 months   1. Increased memory and expressive language skills needed for highest level of independent functioning. REHABILITATION POTENTIAL FOR STATED GOALS: FairPLAN OF CARE:  INTERVENTIONS PLANNED: (Benefits and precautions of therapy have been discussed with the patient.)  1. Cognitive tasks  2. Speech tasks  TREATMENT PLAN EFFECTIVE DATES: 7/31/2019 TO 10/30/2019 (90 days). FREQUENCY/DURATION: Continue to follow patient 2 times a week for 90 days to address above goals. Regarding [address] [place], [place] [address] Road therapy, I certify that the treatment plan above will be carried out by a therapist or under their direction. Thank you for this referral,  Elayne Evans, [facility] MEDICO [facility], [facility], [address] [place] [address]                     Referring Physician Signature: Natasha Camacho MD     Date      SUBJECTIVE:  Pt cooperative. Spouse and interpretors present. Present Symptoms: decreased cognition s/p TBI in 2016      Current Medications: see hard chart    Date Last Reviewed: 10/18/19  Social History/Home Situation: residing with spouse and their 5 children      Work/Activity History:  for [employer]     OBJECTIVE:  Objective Measure:   Tool Used: National Outcomes Measurement System: Functional Communication Measures: SPOKEN LANGUAGE EXPRESSION  Score:  Initial: 5 Most Recent: 5 (Date: 10/18/19 )   Interpretation of Tool: This measure describes the change in functional communication status subsequent to speech-language pathology treatment of patients who have spoken language expression deficits. o Level 1:  The individual attempts to speak, but verbalizations are not meaningful to familiar or unfamiliar communication partners at any time. o Level 2:  The individual attempts to speak, although few attempts are accurate or appropriate. The communication partner must assume responsibility for structuring the communication exchange, and with consistent and maximal cueing, the individual can only occasionally produce automatic and/or imitative words and phrases that are rarely meaningful in context.  o Level 3:  The communication partner must assume responsibility for structuring the communication exchange, and with consistent and moderate cueing, the individual can produce words and phrases that are appropriate and meaningful in context.  o Level 4:  The individual is successfully able to initiate communication using spoken language in simple, structured conversations in routine daily activities with familiar communication partners. The individual usually requires moderate cueing, but is able to demonstrate use of simple sentences (i.e., semantics, syntax, and morphology) and rarely uses complex sentences/messages. o Level 5:  The individual is successfully able to initiate communication using spoken language in structured conversations with both familiar and unfamiliar communication partners. The individual occasionally requires minimal cueing to frame more complex sentences in messages. The individual occasionally self-cues when encountering difficulty. o Level 6:  The individual is successfully able to communicate in most activities, but some limitations in spoken language are still apparent in vocational, avocational, and social activities. The individual rarely requires minimal cueing to frame complex sentences. The individual usually self-cues when encountering difficulty. o Level 7:   The individuals ability to successfully and independently participate in vocational, avocational, and social activities is not limited by spoken language skills. Independent functioning may occasionally include use of self-cueing. Score Level 7 Level 6 Level 5 Level 4 Level 3 Level 2 Level 1   Modifier CH CI CJ CK CL CM CN     Mental Status:  Alert    Neuro-Linguistics:  Stating his address: omitted extension; knew everything else other than the zip code. Was unable to recall the zip code even with cues. Repeating 6 word sentences: part word repetitions observed x2 when repeating sentences. 50% with repeating sentences verbatim without repetitions and 75% with. Cognitive Skills Activities: Activities/Procedures listed utilized to improve and/or restore cognitive function as related to thought organization. Required moderate verbal cueing to improve improve recall of information. __________________________________________________________________________________________________  History of Present Injury/Illness (Reason for Referral):    Treatment Assessment:   . Progression/Medical Necessity:   · Skilled intervention continues to be required due to decreased communication with family/caregivers and decreased cognitive skills. Compliance with Program/Exercises: Will assess as treatment progresses}. Reason for Continuation of Services/Other Comments:  · Patient continues to require skilled intervention due to decreased cognition, anomia. Recommendations/Intent for next treatment session: \"Treatment next visit will focus on cognitive, speech tasks\".      Total Treatment Duration:  Time In: 0850  Time Out: 5 [facility], [facility] 43., CCC-SLP

## 2019-10-23 ENCOUNTER — APPOINTMENT (OUTPATIENT)
Dept: PHYSICAL THERAPY | Age: 49
End: 2019-10-23
Payer: COMMERCIAL

## 2019-10-23 ENCOUNTER — HOSPITAL ENCOUNTER (OUTPATIENT)
Dept: PHYSICAL THERAPY | Age: 49
Discharge: HOME | End: 2019-10-23
Payer: COMMERCIAL

## 2019-10-23 PROCEDURE — 97110 THERAPEUTIC EXERCISES: CPT

## 2019-10-23 PROCEDURE — 97530 THERAPEUTIC ACTIVITIES: CPT

## 2019-10-23 PROCEDURE — 92507 TX SP LANG VOICE COMM INDIV: CPT

## 2019-10-23 NOTE — PROGRESS NOTES
Efra List  : 1970  Primary: [employer]  Secondary:  [address]  at [facility]  Kevon Orozco 64, [address], [place], [address] [facility]  [phone]   [phone]        OUTPATIENT SPEECH LANGUAGE PATHOLOGY: Daily Note: 1    ICD-10: Treatment Diagnosis: cognitive communication deficits R 41.841  REFERRING PHYSICIAN: Naveen Gutiérrez MD MD Orders: speech evaluate and treat  Return Physician Appointment:    PAST MEDICAL HISTORY: TBI, sleep apnea  MEDICAL/REFERRING DIAGNOSIS: TBI (traumatic brain injury) ([address] [S06.9X9A]  DATE OF ONSET:   PRIOR LEVEL OF FUNCTION: with spouse and children   PRECAUTIONS/ALLERGIES: NKDA   ASSESSMENT:  Pt's spouse reported the pt had an appointment with a psychiatrist in [facility] yesterday (Dr. Blair Wayne) and continued the pt's same medications. She initially reported last week it was a neuropysch but clarified this date it is a regular pyschiatrist.  He also saw a family counselor (Jersey Mensah MA, [facility]) yesterday. His spouse reported the counselor said the pt's delays with processing and responding are most likely a side effect of the medications he is taking. Pt recalled going to both appointments, picking up medication afterwards and going to his son's violin concert at school. He was unable to recall the name of his son's school. His spouse reported their son was at a different school when the pt had the accident as their son was still in elementary school at that time. The pt reported his spouse and children are repeating things to help with his memory. He reported he finds that helpful but he will eventually still forget. Pt with persisting difficulties with repeating sentences verbatim as he sometimes changes words. However, he was able to recall the general idea of the sentences.   Discussed various strategies that people for memory such as repetition, visualization, using beginning letters, etc.  The pt reported attempting to repeat things internally when the  was reading sentences but he wasn't able to repeat the whole sentence in time. He reported repetition does usually help. He reported his son shows him picture cards and stories which also helps for a while. However, he reported he forgets after a while regardless of the strategy. Pt with increased tremors with his right cheek this date towards the end of the session. His spouse reported she has noticed that when the pt gets frustrated or anxious or can't do something. Patient will benefit from skilled intervention to address the above impairments. ?????? ? ? This section established at most recent assessment??????????  PROBLEM LIST (Impairments causing functional limitations):  1. Decreased cognition  2. Anomia   GOALS: (Goals have been discussed and agreed upon with patient.)  SHORT-TERM FUNCTIONAL GOALS: Time Frame: 3 months   1. Pt will complete word finding tasks with 80% accuracy. 2. Pt will complete convergent/divergent naming tasks with 80% accuracy. 3. Pt will state his address with only min cues needed. 4. Pt will complete STM tasks with 80% accuracy. 5. Pt will participate in a full cognitive assessment x1. DISCHARGE GOALS: Time Frame: 4-5 months   1. Increased memory and expressive language skills needed for highest level of independent functioning. REHABILITATION POTENTIAL FOR STATED GOALS: FairPLAN OF CARE:  INTERVENTIONS PLANNED: (Benefits and precautions of therapy have been discussed with the patient.)  1. Cognitive tasks  2. Speech tasks  TREATMENT PLAN EFFECTIVE DATES: 7/31/2019 TO 10/30/2019 (90 days). FREQUENCY/DURATION: Continue to follow patient 2 times a week for 90 days to address above goals. Regarding [address] [place] [address] therapy, I certify that the treatment plan above will be carried out by a therapist or under their direction.   Thank you for this referral,  Carol Rios  [address] [id number] [facility] Referring Physician Signature: Carlene Leslie MD     Date      SUBJECTIVE:  Pt cooperative. Spouse and interpretors present. Present Symptoms: decreased cognition s/p TBI in 2016      Current Medications: see hard chart    Date Last Reviewed: 10/23/19  Social History/Home Situation: residing with spouse and their 5 children      Work/Activity History:  for [employer]     OBJECTIVE:  Objective Measure: Tool Used: National Outcomes Measurement System: Functional Communication Measures: SPOKEN LANGUAGE EXPRESSION  Score:  Initial: 5 Most Recent: 5 (Date: 10/18/19 )   Interpretation of Tool: This measure describes the change in functional communication status subsequent to speech-language pathology treatment of patients who have spoken language expression deficits. o Level 1:  The individual attempts to speak, but verbalizations are not meaningful to familiar or unfamiliar communication partners at any time. o Level 2:  The individual attempts to speak, although few attempts are accurate or appropriate. The communication partner must assume responsibility for structuring the communication exchange, and with consistent and maximal cueing, the individual can only occasionally produce automatic and/or imitative words and phrases that are rarely meaningful in context.  o Level 3:  The communication partner must assume responsibility for structuring the communication exchange, and with consistent and moderate cueing, the individual can produce words and phrases that are appropriate and meaningful in context.  o Level 4:  The individual is successfully able to initiate communication using spoken language in simple, structured conversations in routine daily activities with familiar communication partners.  The individual usually requires moderate cueing, but is able to demonstrate use of simple sentences (i.e., semantics, syntax, and morphology) and rarely uses complex sentences/messages. o Level 5:  The individual is successfully able to initiate communication using spoken language in structured conversations with both familiar and unfamiliar communication partners. The individual occasionally requires minimal cueing to frame more complex sentences in messages. The individual occasionally self-cues when encountering difficulty. o Level 6:  The individual is successfully able to communicate in most activities, but some limitations in spoken language are still apparent in vocational, avocational, and social activities. The individual rarely requires minimal cueing to frame complex sentences. The individual usually self-cues when encountering difficulty. o Level 7: The individuals ability to successfully and independently participate in vocational, avocational, and social activities is not limited by spoken language skills. Independent functioning may occasionally include use of self-cueing. Score Level 7 Level 6 Level 5 Level 4 Level 3 Level 2 Level 1   Modifier CH CI CJ CK CL CM CN     Mental Status:  Alert    Neuro-Linguistics:  STM tasks: pt recalled all activities from yesterday independently. Stating his address: omitted extension; knew everything else other than the zip code. Was unable to recall the zip code. Repeating 6 word sentences: 40% independently and 60% with repetitions. Cognitive Skills Activities: Activities/Procedures listed utilized to improve and/or restore cognitive function as related to thought organization. Required moderate verbal cueing to improve improve recall of information. __________________________________________________________________________________________________  History of Present Injury/Illness (Reason for Referral):    Treatment Assessment:   .   Progression/Medical Necessity:   · Skilled intervention continues to be required due to decreased communication with family/caregivers and decreased cognitive skills. Compliance with Program/Exercises: Will assess as treatment progresses}. Reason for Continuation of Services/Other Comments:  · Patient continues to require skilled intervention due to decreased cognition, anomia. Recommendations/Intent for next treatment session: \"Treatment next visit will focus on cognitive, speech tasks\".      Total Treatment Duration:  Time In: 0930  Time Out: [address], MSP, CCC-SLP

## 2019-10-23 NOTE — PROGRESS NOTES
Sarabjit Tyson  : 1970  Primary: [employer]  Secondary:  [address] at [facility]  [address] [facility], [address] [place] [address], [address]  [phone]   [phone]        OUTPATIENT PHYSICAL THERAPY: Daily Treatment Note 10/23/2019    ICD-10: Treatment Diagnosis: Unsteadiness on feet (R26.81)  Other lack of coordination (R27.8)  Repeated falls (R29.6)  TREATMENT PLAN:  Effective Dates: 2019 TO 10/29/2019 (90 days). Frequency/Duration: 2 times a week for 90 Day(s)    Pre-treatment Symptoms/Complaints:  Patient reports he has a little headache today and presents wearing sunglasses. Pt's wife states that blood sugar levels have been going up and down. She reports they were slightly high this morning. Pain: Initial:   6/10 back and both hips Post Session:  Not rated   Medications Last Reviewed:  10/23/2019  Updated Objective Findings:  None Today  TREATMENT:     THERAPEUTIC ACTIVITY: ( 35 minutes): Therapeutic activities per grid below to improve mobility, strength and coordination. Date:  10/4/19 Date:  10-9-19 Date:  10/11/19 Date:  10/4/19 Date:  10/18/19 Date:  10/23/19   Activity/Exercise Parameters   Parameters Parameters     Tone reduction techniques    Attempted weight bearing, pressure to the tendons, estim to fatigue the muscles- no change in tremors with any technique      Transfers - to and from mat  Stand pivot transfers with 2 person assist, min A using rolling walker  Stand pivot transfer with min A x 2 persons using a rolling walker Stand pivot transfers with mod A x 2 persons with rolling walker.   Stand pivot transfer with min-mod A X 2 with RW    Bed mobility  Max A sit to sidelying and sidelying to sit    Supine to sit with min-mod A X 2    Supine to sidelying with mod A    Sidelying to sit with mod A-max A X1    Sit to stand Min A x 2 persons, 5 reps with rolling walker X 4 with mod A Min A x 2 persons, 5 reps Mod A to max A x 2 persons today x 5 reps. Increased verbal cuing needed for safety awareness X 4 with min A-mod A X 2 Multiple times throughout treatment session, min A   Static standing         Standing marching         Ambulation  3 x 20' with rolling walker, 2 person assist +wheelchair follow. Min A 3 x 5 ft with min A and blocking R knee with steps  With rolling walker 2 x 10' with decreased ability to advance R leg today and increased unsteadiness. With rolling walker, 2 person assist + wheelchair follow and 1 person managing walker so 4 persons total.  Max A 3 x 15'. Poor ability of feet to keep up with body. With rolling walker, 2 person assist and wheelchair follow; 25' X 4; some assist needed for rolling walker management and erect posture In // bars X 2 laps with mod-max A and therapist blocking R knee to prevent buckle    With rolling walker, 2 person assist and w/c follow 20' X 2 with verbal cues for upright posture and help with RW management   Standing in ll bars - tapping foot forward and back       X 5 B with Mod A and cues for upright posture; therapist blocking R knee to prevent buckle   Standing in ll bars - tapping foot to side and back       X 5 B with Mod A and cues for upright posture; therapist blocking R knee to prevent buckle   Standing weight shifts             THERAPEUTIC EXERCISE: ( 10 minutes):  Exercises per grid below to improve mobility, strength and coordination.        Date:  10/4/19 Date:  10/9/19 Date:  10/11/19 Date:  10/14/19 Date:   10/18/19 Date:  10/23/19   Activity/Exercise Parameters  Parameters Parameters Parameters     Single knee to chest          Hamstring stretch  Seated 3 x 20 sec B -through small ROM Seated 3x30 sec B through small ROM with noted pain in R LE 3 x 30 sec L sitting in WC, siginficant increase in tremors on R and therefore unable 2 x 20 sec B in sitting with empty end feel  Seated in wheelchair 3 X 30 seconds B   Lower trunk rotation          Long arc Quads Alternating sitting EOM 2 x 10 reps 2 x 5 with mod A on R X 5 reps left- unable on R today 3 x 2 reps B with significant increase in tremors. Patient report increased pain in R side of body with L and R kicking  X 10 B with assist from therapist   Side lying - hip abduction  3 x 5 rep R        Side lying - R hip flexor stretch          Seated at edge of mat marching Marching 3 x 10 reps   Attempted but patient unable to clear the floor with either foot      calf stretch   X 30 sec L  Unable on R due to increased tremors X 30 sec B with empty end feel Attempted X2 B, pt exhibited significant increased pain on R LE     Ankle pumps X 10 reps alternating seated EOB X 10 B alternating       Manual stretch to neck flexors         Cervical chin tuck isometrics         Cervical ROM  X 2 or 3 each direction before he asked to stop due to pain. [employer]  · Treatment/Session Summary:  PTA stressed importance of keeping a food journal in order to manage pt's blood sugars and keep doctors informed of what patient is consuming. Pt's wife verbalized understanding. Pt demonstrated some increased tremors with fatigue, but it seemed to subside some after seated rest break. Pt's ambulation with RW seemed to be improved as evidenced by more upright posture and less verbal cueing needed. · Response to Treatment:  no adverse reactions. · Communication/Consultation:  None today   · Equipment provided today:  None today  · Recommendations/Intent for next treatment session: Next visit will focus on improving functional mobility and balance.       Total Treatment Billable Duration:  45 minutes   PT Patient Time In/Time Out  Time In: 1015  Time Out: [address] [place] [address], [address] [place] [address]    Future Appointments   Date Time Provider Sara Abdi   10/25/2019  9:30 [facility]   10/25/2019 10:15 AM Chava Das, SLP [facility]   10/25/2019 11:00 AM Hanane Espino, PTA [facility] D   10/29/2019  8:45 AM Yanet Das, SLP [facility] D   10/29/2019  9:30 AM Betwu Cobianmer, OTD, OTR/L [facility] D   11/1/2019  8:45 AM Skyla Rod, MOE [facility] D   11/1/2019  9:30 AM Yanet Das, SLP [facility] D   11/6/2019  8:45 AM Lovell Essex, DPT [facility] D   11/11/2019  8:45 AM Lovell Essex, DPT [facility] D   11/11/2019  9:30 AM Yanet Das, SLP [facility] D   11/13/2019  8:45 AM Yanet Das, SLP [facility] D   11/13/2019  9:30 AM Lovell Essex, DPT [facility]   11/20/2019 10:15 AM Yanet Das, SLP [facility] D   11/20/2019 11:00 AM Betwu Primmer[facility] OTD[facility]   11/22/2019 11:00 AM Bethanne Primmer, OTD, OTR/L [facility]

## 2019-10-25 ENCOUNTER — HOSPITAL ENCOUNTER (OUTPATIENT)
Dept: PHYSICAL THERAPY | Age: 49
Discharge: HOME | End: 2019-10-25
Payer: COMMERCIAL

## 2019-10-25 PROCEDURE — 97530 THERAPEUTIC ACTIVITIES: CPT

## 2019-10-25 PROCEDURE — 97110 THERAPEUTIC EXERCISES: CPT

## 2019-10-25 NOTE — PROGRESS NOTES
Johana Ding  : 1970  Primary: [employer]  Secondary:  [address] at [facility]  [address], [address], [place], [facility]  [phone]   [phone]      OUTPATIENT OCCUPATIONAL THERAPY: Daily Treatment Note 10/25/2019  Visit Count:  52    ICD-10: Treatment Diagnosis:  Unspecified lack of coordination (R27.9); Dependence on wheelchair (Z99.3); History of falling (Z91.81)  Precautions/Allergies:   Patient has no allergy information on record. TREATMENT PLAN:  Effective Dates: 2019 TO 11/3/2019 (90 days). Frequency/Duration: 2 times a week for 90 Day(s) - plan for once a week over subsequent visits. Pre-treatment Symptoms/Complaints: Patient/wife state he he doesn't get much pain relief with medicine now. Patient states he is able to put his pants and shirt on, but has difficulty with buttoning/zipping and pulling pants as he can not stand on his own.    Pain: Initial: Pain Intensity 1: 7  Pain Location 1: Back, Neck  Post Session:  same/10   Medications Last Reviewed:  10/25/2019   Updated Objective Findings:    10/11/19:  Rosaura Hill TRAINING (Permobil M3 power w/c):  [x] YES [] NO Cause and effect concepts while in the wheelchair (i.e. Activating a switch causes the wheelchair to move)   [x] YES [] NO Stop and go concepts while in the wheelchair (i.e. \"stop and go\" verbal instructions, or consistently stopping for objects)   [x] YES [] NO Directional concepts while in the wheelchair (i.e. moving the joystick in different directions to move the wheelchair in different directions)   [x] YES [] NO Ability to follow directions: (i.e. Following varying verbal commands in a safe and timely manner)   [x] YES [] NO Adequate visual functioning to safely drive indoors (i.e. Visual attention to environment and ability to avoid obstacles)   [x] YES [] NO Adequate problem solving ability (i.e. Maneuver power wheelchair to designated destination without verbal cues)   [x] YES [] NO Ability to use access method with adequate activation, sustained contact and release (i.e. Able to access switch, control contact, and release when ready to stop wheelchair)   [x] YES [] NO Ability to change drives and or speeds as appropriate for environment (i.e. Decreasing speed in high traffic areas)   [x] YES [] NO Client ready to complete outdoor portion of power wheelchair mobility assessment   [x] YES [] NO Small space maneuverability (room)   [x] YES [] NO Large space maneuverability (hallway)   [x] YES [] NO Accessing Doorways   COMMENTS: Able to drive w/c in reverse with minimal cues        OUTDOOR TRAINING:   [x] YES [] NO Adequate safety judgment while outdoors (i.e. Checking for traffic at crosswalks, parking wheelchair within safe distances of objects)   [x] YES [] NO Stop and go concepts while in the wheelchair (i.e. \"stop and go\" verbal instructions, or consistently stopping for Objects)   [x] YES [] NO Directional concepts while in the wheelchair (i.e. moving the joystick in different directions to move the wheelchair in different directions)   [x] YES [] NO Ability to follow directions: (i.e. Following varying verbal commands in a safe and timely manner)   [x] YES [] NO Adequate visual functioning to safely drive outdoors (i.e. Visual attention to environment and ability to avoid obstacles)   [x] YES [] NO Adequate problem solving ability (i.e. Using curb cutouts when available, shortest/safest route without verbal cues)   [x] YES [] NO Ability to use access method with adequate activation, sustained contact, and release (i.e. Demonstrate safe endurance while operating controls, motor control when maneuvering over terrain)   [x] YES [] NO Ability to change drives and or speeds as appropriate for environment (i.e. Decreasing speed when approaching curbs)   [x] YES [] NO Door access   [x] YES [] NO Curb cutout   [x] YES [] NO Sidewalk with irregularities   [x] YES [] NO Crosswalk   [x] YES [] NO Parking within two feet of target   [x] YES [] NO Inclines (up and down)   [x] YES [] NO Endurance for > 25 feet (power wheelchair control)   COMMENTS:      Mat evaluation (10/4/19): Hip width: 15\"; Upper leg length: 18\" (R/L); lower leg length: 17\" (R/L); Shoulder: R: 22\"; L\": 24\"; Top of head: 33\"; Seat to arm: R: 9\"; L: 13\" ; shoulder to shoulder: 20\"; chest width: 13\" ; chest depth: 9\" ; lower arm length: 12\";   PRESSURE MAPPING Maximum Pressure Outcomes   Pressure Mapping #1 (seated edge of mat) 256 mmHG over L/R ischial tuberosities         RECOMMENDATIONS:        TREATMENT:   Therapeutic Activity: (    8 minutes):  Therapeutic activities including Bed transfers, Chair transfers and w/c transfers to improve mobility, strength, balance and coordination.  Required moderate   to promote static and dynamic balance in sitting and promote coordination of bilateral, upper extremity(s), trunk. Patient assisted to and from the w/c to mat/mat to w/c utilizing rolling walker with minimal to moderate assistance. He pivoted to the R to transfer to the L due to shaking in his leg. P  FUNCTIONAL MOBILITY:   Transfers:   Wheelchair to Wheelchair: Stand pivot transfer to the R with minimal to moderate assistance x 2 reps utilizing rolling walker. Sit to Stand: Minimal assistance. Functional weight shift: Maximal assistance to complete functional weight-shifts. Wheelchair Management and Training: (0minutes): Procedure(s) utilized to improve and/or restore functioning as related to power wheelchair mobility and seating/positioning in power wheelchair. Mr. Chiquis Saucedo demonstrated ability to complete full power tilt as needed for scooting back into w/c for optimal pressure re-distribution. He was able to lower/raise elevating leg rests while in full tilt as needed to manage BLE edema and place BLE above the heart to do so. Mr. Chiquis Saucedo states his hip/back felt better in this position.   Mr. Chiquis Saucedo also completed power recline independently  And states his shoulder (R) felt less pain when doing so. Lastly he independently placed the power w/c in anterior tilt in preparation for functional transfers and lifted the foot plates with reacher in preparation for functional transfer. Therapeutic Exercise: (  30 minutes):  Exercises per grid below to improve mobility, strength, balance and coordination. Required moderate visual, verbal, manual and tactile cues to promote proper body alignment, promote proper body posture, promote proper body mechanics and promote proper body breathing techniques. Progressed resistance, range, repetitions and complexity of movement as indicated. Mr. Maryse Vo required 2 pillows vertically underneath R UE and at least one under forearm on L + wedge behind for trunk support to complete below exercises. Date:  10/2 Date:  10/26 Date:     Activity/Exercise Parameters Parameters Parameters   Elbow flexion/extension 1-2 sets 5-10 reps AROM      Shoulder elevation 1-2 sets 5-10 reps AROM (reports R neck pain with this)     Shoulder ER 1-2 sets 2-3 reps. (reports neck pain with this)     Digit abduction/adduction 1-2 sets 5-10 reps AROM L hand     Individual finger extension  1. L hand 4-5 sets. Thumb to index/middle finger opposition. 1. L hand 4-5 sets. Paper crumple  1. L hand for composite flexion 10-12 reps. 2. 1 + bringing paper to face 5-10 reps. [facility]  Treatment/Session Summary:  Mr. Maryse Vo seems to do better with meaningful tasks with L UE coordination. He would benefit from a specilalized cup durbin for power w/c if gets one. Continue OT per plan of care. · Response to Treatment:  tolerated without complications.   · Communication/Consultation:  Progress report sent to MD  · Equipment provided today:  given long handled shoe horn, button hook, reacher, and sock aide  · Recommendations/Intent for next treatment session: Next visit will focus on advancement to more challenging activities. .    Total Treatment Billable Duration:  40 minutes  OT Patient Time In/Time Out  Time In: 0935  Time Out: [address] [facility], OTD, OTR/L    Future Appointments   Date Time Provider Sara Trinidad   10/29/2019  8:45 AM Tito Das Arm, SLP [facility]   10/29/2019  9:30 AM JADA Stahl, OTR/L [facility]   11/1/2019  8:45 AM Rian Boas, PTA [facility]   11/1/2019  9:30 AM Tito Das Arm, SLP [facility] D   11/6/2019  8:45 AM ZENAIDA DacostaT [facility] D   11/11/2019  8:45 AM ZENAIDA DacostaT [facility] D   11/11/2019  9:30 AM Tito Das Arm, SLP [facility] D   11/13/2019  8:45 AM Tito Das Arm, SLP [facility] D   11/13/2019  9:30 AM Viviana Contreras DPT [facility] D   11/20/2019 10:15 AM Tito Das Arm, SLP [facility] SFD   11/20/2019 11:00 AM JADA Stahl, OTR/L [facility]

## 2019-10-25 NOTE — PROGRESS NOTES
Tr Mcgee  : 1970  Primary: [employer]  Secondary:  [id number]  at [facility]  [address], [address], [place], [facility]  [phone]   [phone]        OUTPATIENT PHYSICAL THERAPY: Daily Treatment Note 10/25/2019    ICD-10: Treatment Diagnosis: Unsteadiness on feet (R26.81)  Other lack of coordination (R27.8)  Repeated falls (R29.6)  TREATMENT PLAN:  Effective Dates: 2019 TO 10/29/2019 (90 days). Frequency/Duration: 2 times a week for 90 Day(s)    Pre-treatment Symptoms/Complaints:  Patient reports he has pain all over his body all the time. Pain in spine and knees especially with patient reporting that it feels like his bones are rubbing together. Wife and interpreters in with patient during treatment translating and wife assisting patient. Pain: Initial:   6/10 back and both hips Post Session:  Not rated   Medications Last Reviewed:  10/25/2019  Updated Objective Findings:  None Today  TREATMENT:     THERAPEUTIC ACTIVITY: ( 40 minutes): Therapeutic activities per grid below to improve mobility, strength and coordination. Date:  10/4/19 Date:  10-9-19 Date:  10/11/19 Date:  10/4/19 Date:  10/18/19 Date:  10/23/19 Date  10/25/19   Activity/Exercise Parameters   Parameters Parameters      Tone reduction techniques    Attempted weight bearing, pressure to the tendons, estim to fatigue the muscles- no change in tremors with any technique       Transfers - to and from mat  Stand pivot transfers with 2 person assist, min A using rolling walker  Stand pivot transfer with min A x 2 persons using a rolling walker Stand pivot transfers with mod A x 2 persons with rolling walker.   Stand pivot transfer with min-mod A X 2 with RW     Bed mobility  Max A sit to sidelying and sidelying to sit    Supine to sit with min-mod A X 2    Supine to sidelying with mod A    Sidelying to sit with mod A-max A X1     Sit to stand Min A x 2 persons, 5 reps with rolling walker X 4 with mod A Min A x 2 persons, 5 reps Mod A to max A x 2 persons today x 5 reps. Increased verbal cuing needed for safety awareness X 4 with min A-mod A X 2 Multiple times throughout treatment session, min A Multiple times throughout treatment session, min A   Static standing          Standing marching          Ambulation  3 x 20' with rolling walker, 2 person assist +wheelchair follow. Min A 3 x 5 ft with min A and blocking R knee with steps  With rolling walker 2 x 10' with decreased ability to advance R leg today and increased unsteadiness. With rolling walker, 2 person assist + wheelchair follow and 1 person managing walker so 4 persons total.  Max A 3 x 15'. Poor ability of feet to keep up with body.   With rolling walker, 2 person assist and wheelchair follow; 25' X 4; some assist needed for rolling walker management and erect posture In // bars X 2 laps with mod-max A and therapist blocking R knee to prevent buckle    With rolling walker, 2 person assist and w/c follow 20' X 1 with verbal cues for upright posture and help with RW management In // bars X 2 laps with mod-max A and therapist blocking R knee to prevent buckle    With rolling walker, 2 person assist and w/c follow 20' X 1 with verbal cues for upright posture and help with RW management  Patient reported to tired f   Standing in ll bars - tapping foot forward and back       X 5 B with Mod A and cues for upright posture; therapist blocking R knee to prevent buckle X 5 B with Mod A and cues for upright posture; therapist blocking R knee to prevent buckle   Standing in ll bars - tapping foot to side and back       X 5 B with Mod A and cues for upright posture; therapist blocking R knee to prevent buckle X 5 B with Mod A and cues for upright posture;therapist blocking R knee to prevent buckle   Standing weight shifts            therapist blocking R knee to prevent buckle  THERAPEUTIC EXERCISE: ( 10 minutes):  Exercises per grid below to improve mobility, strength and coordination. Date:  10/4/19 Date:  10/9/19 Date:  10/11/19 Date:  10/14/19 Date:   10/18/19 Date:  10/23/19 Date  10/25/19   Activity/Exercise Parameters  Parameters Parameters Parameters      Single knee to chest           Hamstring stretch  Seated 3 x 20 sec B -through small ROM Seated 3x30 sec B through small ROM with noted pain in R LE 3 x 30 sec L sitting in WC, siginficant increase in tremors on R and therefore unable 2 x 20 sec B in sitting with empty end feel  Seated in wheelchair 3 X 30 seconds B Seated in wheelchair 2 X 30 seconds B Patient reports increased pain    Lower trunk rotation           Long arc Quads Alternating sitting EOM 2 x 10 reps 2 x 5 with mod A on R X 5 reps left- unable on R today 3 x 2 reps B with significant increase in tremors. Patient report increased pain in R side of body with L and R kicking  X 10 B with assist from therapist X 10 B with assist from therapist with patient reporting increased pain   Side lying - hip abduction  3 x 5 rep R         Side lying - R hip flexor stretch           Seated at edge of mat marching Marching 3 x 10 reps   Attempted but patient unable to clear the floor with either foot       calf stretch   X 30 sec L  Unable on R due to increased tremors X 30 sec B with empty end feel Attempted X2 B, pt exhibited significant increased pain on R LE      Ankle pumps X 10 reps alternating seated EOB X 10 B alternating        Manual stretch to neck flexors          Cervical chin tuck isometrics          Cervical ROM  X 2 or 3 each direction before he asked to stop due to pain. [employer]  · Treatment/Session Summary:  Patient continues to complain of high levels of pain in various body parts during treatment. Exercises limited by pain. Not as much pain reported during functional activities like walking. Family very supportive.  Patient fatigued after first walk with walker today and declined second walk. Continue per plan of care. · Response to Treatment:  no adverse reactions. · Communication/Consultation:  None today   · Equipment provided today:  None today  · Recommendations/Intent for next treatment session: Next visit will focus on improving functional mobility and balance.       Total Treatment Billable Duration:  40 minutes   PT Patient Time In/Time Out  Time In: 1100  Time Out: 12 [place], [place]    Future Appointments   Date Time Provider Sara Abdi   10/29/2019  8:45 AM Yadira Das SLP [facility]   10/29/2019  9:30 AM JADA Reece, OTR/L [facility]   11/1/2019  8:45 AM Sailaja Matos PTA [facility]   11/1/2019  9:30 AM Yadira Das SLP [facility]   11/6/2019  8:45 AM Wyatt Kearney, DPT [facility]   11/11/2019  8:45 AM Wyatt Kearney, DPT [facility]   11/11/2019  9:30 AM Yadira Das SLP [facility]   11/13/2019  8:45 AM Yadira Das, SLP [facility]   11/13/2019  9:30 AM Wyatt Santosh, DPT [facility]   11/20/2019 10:15 AM Yadira Das SLP [facility]   11/20/2019 11:00 AM JADA Reece, OTR/L [facility]

## 2019-10-28 NOTE — PROGRESS NOTES
Speech Pathology  Patient did not receive ST due to facility cancellation as SLP was out sick.     [address], [facility] [address] [facility]

## 2019-10-29 ENCOUNTER — HOSPITAL ENCOUNTER (OUTPATIENT)
Dept: PHYSICAL THERAPY | Age: 49
Discharge: HOME | End: 2019-10-29
Payer: COMMERCIAL

## 2019-10-29 PROCEDURE — 97542 WHEELCHAIR MNGMENT TRAINING: CPT

## 2019-10-29 PROCEDURE — 92507 TX SP LANG VOICE COMM INDIV: CPT

## 2019-10-29 PROCEDURE — 97530 THERAPEUTIC ACTIVITIES: CPT

## 2019-10-29 NOTE — PROGRESS NOTES
Yesika Horowitz  : 1970  Primary: [employer]  [facility]:  [facility] [address], [place], [facility]  [phone]   [phone]      OUTPATIENT OCCUPATIONAL THERAPY: Daily Treatment Note 10/29/2019  Visit Count:  48    ICD-10: Treatment Diagnosis:  Unspecified lack of coordination (R27.9); Dependence on wheelchair (Z99.3); History of falling (Z91.81)  Precautions/Allergies:   Patient has no allergy information on record. TREATMENT PLAN:  Effective Dates: 2019 TO 11/3/2019 (90 days). Frequency/Duration: 2 times a week for 90 Day(s) - plan for once a week over subsequent visits. Pre-treatment Symptoms/Complaints: Patient/wife states he's about the same.    Pain: Initial: Pain Intensity 1: (did not rate)  Pain Location 1: Back, Neck  Post Session:  same/10   Medications Last Reviewed:  10/29/2019   Updated Objective Findings:    10/11/19:  Faraz Murguia TRAINING ([employer]il M3 power w/c):  [x] YES [] NO Cause and effect concepts while in the wheelchair (i.e. Activating a switch causes the wheelchair to move)   [x] YES [] NO Stop and go concepts while in the wheelchair (i.e. \"stop and go\" verbal instructions, or consistently stopping for objects)   [x] YES [] NO Directional concepts while in the wheelchair (i.e. moving the joystick in different directions to move the wheelchair in different directions)   [x] YES [] NO Ability to follow directions: (i.e. Following varying verbal commands in a safe and timely manner)   [x] YES [] NO Adequate visual functioning to safely drive indoors (i.e. Visual attention to environment and ability to avoid obstacles)   [x] YES [] NO Adequate problem solving ability (i.e. Maneuver power wheelchair to designated destination without verbal cues)   [x] YES [] NO Ability to use access method with adequate activation, sustained contact and release (i.e. Able to access switch, control contact, and release when ready to stop wheelchair)   [x] YES [] NO Ability to change drives and or speeds as appropriate for environment (i.e. Decreasing speed in high traffic areas)   [x] YES [] NO Client ready to complete outdoor portion of power wheelchair mobility assessment   [x] YES [] NO Small space maneuverability (room)   [x] YES [] NO Large space maneuverability (hallway)   [x] YES [] NO Accessing Doorways   COMMENTS: Able to drive w/c in reverse with minimal cues        OUTDOOR TRAINING:   [x] YES [] NO Adequate safety judgment while outdoors (i.e. Checking for traffic at crosswalks, parking wheelchair within safe distances of objects)   [x] YES [] NO Stop and go concepts while in the wheelchair (i.e. \"stop and go\" verbal instructions, or consistently stopping for Objects)   [x] YES [] NO Directional concepts while in the wheelchair (i.e. moving the joystick in different directions to move the wheelchair in different directions)   [x] YES [] NO Ability to follow directions: (i.e. Following varying verbal commands in a safe and timely manner)   [x] YES [] NO Adequate visual functioning to safely drive outdoors (i.e. Visual attention to environment and ability to avoid obstacles)   [x] YES [] NO Adequate problem solving ability (i.e. Using curb cutouts when available, shortest/safest route without verbal cues)   [x] YES [] NO Ability to use access method with adequate activation, sustained contact, and release (i.e. Demonstrate safe endurance while operating controls, motor control when maneuvering over terrain)   [x] YES [] NO Ability to change drives and or speeds as appropriate for environment (i.e. Decreasing speed when approaching curbs)   [x] YES [] NO Door access   [x] YES [] NO Curb cutout   [x] YES [] NO Sidewalk with irregularities   [x] YES [] NO Crosswalk   [x] YES [] NO Parking within two feet of target   [x] YES [] NO Inclines (up and down)   [x] YES [] NO Endurance for > 25 feet (power wheelchair control) COMMENTS:      Mat evaluation (10/4/19): Hip width: 15\"; Upper leg length: 18\" (R/L); lower leg length: 17\" (R/L); Shoulder: R: 22\"; L\": 24\"; Top of head: 33\"; Seat to arm: R: 9\"; L: 13\" ; shoulder to shoulder: 20\"; chest width: 13\" ; chest depth: 9\" ; lower arm length: 12\";   PRESSURE MAPPING Maximum Pressure Outcomes   Pressure Mapping #1 (seated edge of mat) 256 mmHG over L/R ischial tuberosities         RECOMMENDATIONS:        TREATMENT:   Therapeutic Activity: (23  minutes):  Therapeutic activities including Bed transfers, Chair transfers and w/c transfers to improve mobility, strength, balance and coordination.  Required moderate to promote static and dynamic balance in sitting and standing and promote coordination of bilateral, upper extremity(s), trunk. Patient assisted to and from the w/c to mat/mat to w/c utilizing weighted rolling walker with minimal to moderate assistance. He pivoted to the R to transfer to the L due to shaking in his leg 4 times to work on transferring to and from power w/c to improve independence with functional mobility for ADL. He also worked on using a straight cane to push on/scoot back into power w/c and his manual w/c. He also stood and took sidesteps with CGA. FUNCTIONAL MOBILITY:   Transfers:   Wheelchair to Wheelchair: Stand pivot transfer to the R with minimal to moderate assistance x 2 reps utilizing rolling walker. Sit to Stand: Minimal assistance. Functional weight shift: Moderate assistance to complete functional weight-shifts. Wheelchair Management and Training: (15 minutes): Procedure(s) utilized to improve and/or restore functioning as related to power wheelchair mobility and seating/positioning in power wheelchair. Mr. Tami Rodriguez demonstrated ability to complete power tilt as needed for scooting back into w/c for optimal pressure re-distribution.   Mr Tami Rodriguez practiced lifting/lowering/swinging back the foot plates of power w/c and manual tilt-in-space w/c with a cane in preparation for functional transfer. He also independently swung away the joystick. [employer] Portal  Treatment/Session Summary:  Mr. Maryse Vo did better with functional transfers using a cane and weighted rolling walker (from power w/c to mat was contact guard assistance). .  Plan to contact doctor regarding recommendation for power w/c prior to his doctor visit. Continue OT per plan of care. · Response to Treatment:  tolerated without complications. · Communication/Consultation:  Progress report sent to MD  · Equipment provided today:  given long handled shoe horn, button hook, reacher, and sock aide  · Recommendations/Intent for next treatment session: Next visit will focus on advancement to more challenging activities. .    Total Treatment Billable Duration:  40 minutes  OT Patient Time In/Time Out  Time In: 0930  Time Out: 1015  GERALD Matute/L    Future Appointments   Date Time Provider Sara Abdi   11/1/2019  8:45 AM JAKE James [facility]   11/1/2019  9:30 AM Cat Das SLP [facility]   11/6/2019  8:45 AM JAKE [facility]   11/11/2019  8:45 AM JAKE JamesDORPT [facility]   11/11/2019  9:30 AM Cat Das SLP [facility]   11/13/2019  8:45 AM Cat Das SLP [facility]   11/13/2019  9:30 AM JAKE James [facility]   11/20/2019 10:15 AM Cat Das SLP [facility]   11/20/2019 11:00 AM JADA Garcia, OTR/L [facility]

## 2019-10-29 NOTE — THERAPY RECERTIFICATION
Holly Clay  : 1970  Primary: [employer]  Secondary:  [address]  at [facility]  [address], [address], [place] [address] [address] [facility]  [phone]   [phone]        OUTPATIENT SPEECH LANGUAGE PATHOLOGY: Recertification    [id number]: Treatment Diagnosis: cognitive communication deficits R 41.841  REFERRING PHYSICIAN: Karl Jewell MD MD Orders: speech evaluate and treat  Return Physician Appointment:    PAST MEDICAL HISTORY: TBI, sleep apnea  MEDICAL/REFERRING DIAGNOSIS: TBI (traumatic brain injury) ([facility] [address] [S06.9X9A]  DATE OF ONSET:   PRIOR LEVEL OF FUNCTION: with spouse and children   PRECAUTIONS/ALLERGIES: NKDA   ASSESSMENT:  Pt has attended 19 sessions due to decreased cognition. This is only his second session since his last progress note was written. He reported his children assisted him with learning his zip code by repeating it, having him repeat it, then writing it down and having him read it aloud. He reported repeating the zip code and seeing it was the most helpful. He reported he pictured the zip code it in his mind after seeing it. He required mild cues to add details when describing visual images this date. He reported it took him longer to process the initial image resulting in delays with describing. However, anomia was not observed during descriptions. Feel pt's anomia has improved. However, the pt continues to exhibit significant deficits with memory. Deficits are also observed with processing. Patient will benefit from skilled intervention to address the above impairments. ?????? ? ? This section established at most recent assessment??????????  PROBLEM LIST (Impairments causing functional limitations):  1. Decreased cognition  2. Anomia   GOALS: (Goals have been discussed and agreed upon with patient.)  SHORT-TERM FUNCTIONAL GOALS: Time Frame: 3 months   1. Pt will complete word finding tasks with 80% accuracy.   Goal not targeted. 2. Pt will complete convergent/divergent naming tasks with 80% accuracy. Goal not targeted. 3. Pt will state his address with only min cues needed. Goal met. 4. Pt will complete STM tasks with 80% accuracy. Goal not met. 5. Pt will participate in a full cognitive assessment x1. Goal not targeted. 6. Pt will complete immediate memory tasks with 80% accuracy. 7. Pt will summarize/paraphrase information read to him with 80% accuracy. 8. Pt will complete sequencing tasks with 80% accuracy. DISCHARGE GOALS: Time Frame: 4-5 months   1. Increased memory and expressive language skills needed for highest level of independent functioning. REHABILITATION POTENTIAL FOR STATED GOALS: FairPLAN OF CARE:  INTERVENTIONS PLANNED: (Benefits and precautions of therapy have been discussed with the patient.)  1. Cognitive tasks  2. Speech tasks  TREATMENT PLAN EFFECTIVE DATES: 10/29/2019 TO 1/28/2020 (90 days). FREQUENCY/DURATION: Continue to follow patient 2 times a week for 90 days to address above goals. Regarding [address] [place] [address] Road therapy, I certify that the treatment plan above will be carried out by a therapist or under their direction. Thank you for this referral,  Riva Felty, [address] [address] [facility]                     Referring Physician Signature: Monico Braxton MD     Date      SUBJECTIVE:  Pt cooperative. Spouse and interpretor present. Present Symptoms: decreased cognition s/p TBI in 2016      Current Medications: see hard chart    Date Last Reviewed: 10/29/19  Social History/Home Situation: residing with spouse and their 5 children      Work/Activity History:  for [employer]     OBJECTIVE:  Objective Measure:   Tool Used: National Outcomes Measurement System: Functional Communication Measures: SPOKEN LANGUAGE EXPRESSION  Score:  Initial: 5 Most Recent: 6 (Date: 10/29/19 )   Interpretation of Tool: This measure describes the change in functional communication status subsequent to speech-language pathology treatment of patients who have spoken language expression deficits. o Level 1:  The individual attempts to speak, but verbalizations are not meaningful to familiar or unfamiliar communication partners at any time. o Level 2:  The individual attempts to speak, although few attempts are accurate or appropriate. The communication partner must assume responsibility for structuring the communication exchange, and with consistent and maximal cueing, the individual can only occasionally produce automatic and/or imitative words and phrases that are rarely meaningful in context.  o Level 3:  The communication partner must assume responsibility for structuring the communication exchange, and with consistent and moderate cueing, the individual can produce words and phrases that are appropriate and meaningful in context.  o Level 4:  The individual is successfully able to initiate communication using spoken language in simple, structured conversations in routine daily activities with familiar communication partners. The individual usually requires moderate cueing, but is able to demonstrate use of simple sentences (i.e., semantics, syntax, and morphology) and rarely uses complex sentences/messages. o Level 5:  The individual is successfully able to initiate communication using spoken language in structured conversations with both familiar and unfamiliar communication partners. The individual occasionally requires minimal cueing to frame more complex sentences in messages. The individual occasionally self-cues when encountering difficulty. o Level 6:  The individual is successfully able to communicate in most activities, but some limitations in spoken language are still apparent in vocational, avocational, and social activities. The individual rarely requires minimal cueing to frame complex sentences. The individual usually self-cues when encountering difficulty. o Level 7:   The individuals ability to successfully and independently participate in vocational, avocational, and social activities is not limited by spoken language skills. Independent functioning may occasionally include use of self-cueing. Score Level 7 Level 6 Level 5 Level 4 Level 3 Level 2 Level 1   Modifier CH CI CJ CK CL CM CN     Tool Used: National Outcomes Measurement System: Functional Communication Measures: MEMORY  Score:  Initial: 4 Most Recent: X (Date: -- )   Interpretation of Tool: This measure describes the change in functional communication status subsequent to speech-language pathology treatment of patients who have memory deficits. o Level 1:  The individual is unable to recall any information, regardless of cueing. o Level 2: The individual consistently requires maximal verbal cues or uses external aids to recall personal information (e.g., family members, biographical information, physical location, etc.) in structured environments. o Level 3: The individual usually requires maximum cues to recall or use external aids for simple routine and personal information (e.g., schedule, names of familiar staff, location of therapy areas, etc.) in structured environments. o Level 4: The individual occasionally requires minimal cues to recall or use external memory aids for simple routine and personal information in structured environments. The individual requires consistent maximal cues to recall or use memory aids for complex and novel information (e.g., carry out multiple steps activities, accommodate schedule changes, anticipate meal times, etc.), plan and follow through on simple future events (e.g., use calendar to keep appointments, use log books to complete a single assignment/task, etc.) in structured environments. o Level 5 The individual consistently requires minimal cues to recall or use external memory aids for complex and novel information.  The individual consistently requires minimal cues to plan and follow through on complex future events (e.g., menu planning and meal preparation, planning a party, etc.).  o Level 6: The individual is able to recall or use external aids/memory strategies for complex information and planning complex future events most of the time. When there is a breakdown in the use of recall/memory strategies/external memory aids, the individual occasionally requires minimal cues. These breakdowns may occasionally interfere with the individuals functioning in vocational, avocational, and social activities. o Level 7: The individual is successful and independent in recalling or using external aids/memory strategies for complex information and planning future events in all vocational, avocational, and social activities. Score Level 7 Level 6 Level 5 Level 4 Level 3 Level 2 Level 1   Modifier CH CI CJ CK CL CM CN       Mental Status:  Alert    Neuro-Linguistics:  Stating his address: omitted extension; knew everything else including the zip code. He reported his children have reviewed their zip code with him. Describing images for increased recall: mild cues needed to add details. Answering questions re: details in sentences with use of visual imagery: 3/5 independently; 4/5 with cues. Repeating the sentences read to him (with paraphrasing): 100%. Cognitive Skills Activities: Activities/Procedures listed utilized to improve and/or restore cognitive function as related to thought organization. Required moderate verbal cueing to improve improve recall of information. __________________________________________________________________________________________________  History of Present Injury/Illness (Reason for Referral):    Treatment Assessment:   . Progression/Medical Necessity:   · Skilled intervention continues to be required due to decreased communication with family/caregivers and decreased cognitive skills. Compliance with Program/Exercises:  Will assess as treatment progresses}. Reason for Continuation of Services/Other Comments:  · Patient continues to require skilled intervention due to decreased cognition, anomia. Recommendations/Intent for next treatment session: \"Treatment next visit will focus on cognitive, speech tasks\".      Total Treatment Duration:  Time In: 0845  Time Out: [address] [facility], [facility] CCC-SLP

## 2019-11-01 ENCOUNTER — HOSPITAL ENCOUNTER (OUTPATIENT)
Dept: PHYSICAL THERAPY | Age: 49
Discharge: HOME | End: 2019-11-01
Payer: COMMERCIAL

## 2019-11-01 PROCEDURE — 97530 THERAPEUTIC ACTIVITIES: CPT

## 2019-11-01 PROCEDURE — 97110 THERAPEUTIC EXERCISES: CPT

## 2019-11-01 PROCEDURE — 92507 TX SP LANG VOICE COMM INDIV: CPT

## 2019-11-01 NOTE — PROGRESS NOTES
Rudy Hanna  : 1970  Primary: [employer]  Secondary:  [address]  at [facility]  [address], [address], [place] [address] [address] [facility]  [phone]   [phone]        OUTPATIENT SPEECH LANGUAGE PATHOLOGY: Daily Note: 1    ICD-10: Treatment Diagnosis: cognitive communication deficits R 41.841  REFERRING PHYSICIAN: Rosaura Mejia MD MD Orders: speech evaluate and treat  Return Physician Appointment:    PAST MEDICAL HISTORY: TBI, sleep apnea  MEDICAL/REFERRING DIAGNOSIS: TBI (traumatic brain injury) ([facility] [address] [S06.9X9A]  DATE OF ONSET:   PRIOR LEVEL OF FUNCTION: with spouse and children   PRECAUTIONS/ALLERGIES: NKDA   ASSESSMENT:  Pt was unable to recall the activity from last session or strategies that were discussed even with cues. Pt required explanation to comprehend that visualization was a strategy. He reported at times he sometimes uses images but then other images come into his head and distract him. When he described times he uses for visualization it was more related to imagery to make decisions vs as a strategy for memory. Discussed imagery as a memory strategy. Had pt create sentences with visualization to recall a word list.  He did well creating the sentence and could recall it but was unable to recall the actual list.  Therefore, question if pt completely comprehended the task. His spouse said they use a lot of pictures at home for the pt. For example, they have a board with a schedule on it and they put pictures of what he's supposed to be doing at certain time. The pt reported he is able to remember to look at the schedule normally but if he forgets his children remind him. His wife also said she uses a fork on the table to cue the pt it is time to eat as the pt sometimes forgets. His wife reported shorter sentences/information is easier for the pt to remember.   She reported one of their children was trying to get the pt to remember his birthday. She reported the pt could recall their son's birthday easier when he was told 6[birth year]06 vs June 13, 2005. Patient will benefit from skilled intervention to address the above impairments. ?????? ? ? This section established at most recent assessment??????????  PROBLEM LIST (Impairments causing functional limitations):  1. Decreased cognition  2. Anomia   GOALS: (Goals have been discussed and agreed upon with patient.)  SHORT-TERM FUNCTIONAL GOALS: Time Frame: 3 months   1. Pt will complete word finding tasks with 80% accuracy. 2. Pt will complete convergent/divergent naming tasks with 80% accuracy. 3. Pt will complete STM tasks with 80% accuracy. 4. Pt will participate in a full cognitive assessment x1.    5. Pt will complete immediate memory tasks with 80% accuracy. 6. Pt will summarize/paraphrase information read to him with 80% accuracy. 7. Pt will complete sequencing tasks with 80% accuracy. DISCHARGE GOALS: Time Frame: 4-5 months   1. Increased memory and expressive language skills needed for highest level of independent functioning. REHABILITATION POTENTIAL FOR STATED GOALS: FairPLAN OF CARE:  INTERVENTIONS PLANNED: (Benefits and precautions of therapy have been discussed with the patient.)  1. Cognitive tasks  2. Speech tasks  TREATMENT PLAN EFFECTIVE DATES: 10/29/2019 TO 1/28/2020 (90 days). FREQUENCY/DURATION: Continue to follow patient 2 times a week for 90 days to address above goals. Regarding [address] [place] [address] Road therapy, I certify that the treatment plan above will be carried out by a therapist or under their direction. Thank you for this referral,  [address] [address] [facility]                     Referring Physician Signature: Apolonia Addison MD     Date      SUBJECTIVE:  Pt cooperative. Spouse and interpretor present.     Present Symptoms: decreased cognition s/p TBI in 2016      Current Medications: see hard chart    Date Last Reviewed: 10/29/19  Social History/Home Situation: residing with spouse and their 5 children      Work/Activity History:  for [employer]     OBJECTIVE:  Objective Measure: Tool Used: National Outcomes Measurement System: Functional Communication Measures: SPOKEN LANGUAGE EXPRESSION  Score:  Initial: 5 Most Recent: 6 (Date: 10/29/19 )   Interpretation of Tool: This measure describes the change in functional communication status subsequent to speech-language pathology treatment of patients who have spoken language expression deficits. o Level 1:  The individual attempts to speak, but verbalizations are not meaningful to familiar or unfamiliar communication partners at any time. o Level 2:  The individual attempts to speak, although few attempts are accurate or appropriate. The communication partner must assume responsibility for structuring the communication exchange, and with consistent and maximal cueing, the individual can only occasionally produce automatic and/or imitative words and phrases that are rarely meaningful in context.  o Level 3:  The communication partner must assume responsibility for structuring the communication exchange, and with consistent and moderate cueing, the individual can produce words and phrases that are appropriate and meaningful in context.  o Level 4:  The individual is successfully able to initiate communication using spoken language in simple, structured conversations in routine daily activities with familiar communication partners. The individual usually requires moderate cueing, but is able to demonstrate use of simple sentences (i.e., semantics, syntax, and morphology) and rarely uses complex sentences/messages. o Level 5:  The individual is successfully able to initiate communication using spoken language in structured conversations with both familiar and unfamiliar communication partners. The individual occasionally requires minimal cueing to frame more complex sentences in messages. The individual occasionally self-cues when encountering difficulty. o Level 6:  The individual is successfully able to communicate in most activities, but some limitations in spoken language are still apparent in vocational, avocational, and social activities. The individual rarely requires minimal cueing to frame complex sentences. The individual usually self-cues when encountering difficulty. o Level 7: The individuals ability to successfully and independently participate in vocational, avocational, and social activities is not limited by spoken language skills. Independent functioning may occasionally include use of self-cueing. Score Level 7 Level 6 Level 5 Level 4 Level 3 Level 2 Level 1   Modifier CH CI CJ CK CL CM CN     Tool Used: National Outcomes Measurement System: Functional Communication Measures: MEMORY  Score:  Initial: 4 Most Recent: X (Date: -- )   Interpretation of Tool: This measure describes the change in functional communication status subsequent to speech-language pathology treatment of patients who have memory deficits. o Level 1:  The individual is unable to recall any information, regardless of cueing. o Level 2: The individual consistently requires maximal verbal cues or uses external aids to recall personal information (e.g., family members, biographical information, physical location, etc.) in structured environments. o Level 3: The individual usually requires maximum cues to recall or use external aids for simple routine and personal information (e.g., schedule, names of familiar staff, location of therapy areas, etc.) in structured environments. o Level 4: The individual occasionally requires minimal cues to recall or use external memory aids for simple routine and personal information in structured environments.  The individual requires consistent maximal cues to recall or use memory aids for complex and novel information (e.g., carry out multiple steps activities, accommodate schedule changes, anticipate meal times, etc.), plan and follow through on simple future events (e.g., use calendar to keep appointments, use log books to complete a single assignment/task, etc.) in structured environments. o Level 5 The individual consistently requires minimal cues to recall or use external memory aids for complex and novel information. The individual consistently requires minimal cues to plan and follow through on complex future events (e.g., menu planning and meal preparation, planning a party, etc.).  o Level 6: The individual is able to recall or use external aids/memory strategies for complex information and planning complex future events most of the time. When there is a breakdown in the use of recall/memory strategies/external memory aids, the individual occasionally requires minimal cues. These breakdowns may occasionally interfere with the individuals functioning in vocational, avocational, and social activities. o Level 7: The individual is successful and independent in recalling or using external aids/memory strategies for complex information and planning future events in all vocational, avocational, and social activities. Score Level 7 Level 6 Level 5 Level 4 Level 3 Level 2 Level 1   Modifier CH CI CJ CK CL CM CN       Mental Status:  Alert    Neuro-Linguistics: Answering questions re: details in sentences from last session with use of imagery: 0/2 even with cues. Pt did not recall the task at all from last session. Used the same questions from last session again. Repeating sentences: 1/1  Describing the sentence using visual imagery: cues required as the pt didn't comprehend the task as he was taking it literally. Therefore, task discontinued. Repeating 3 words: 1/1  Creating a sentence with those words: 1/1  Recalling the words after saying his sentence: 0/1 as he could recall the sentence but not the specific words.   He recalled 2/3 of the original words and 1 of the words was one he had added. Cognitive Skills Activities: Activities/Procedures listed utilized to improve and/or restore cognitive function as related to thought organization. Required moderate verbal cueing to improve improve recall of information. __________________________________________________________________________________________________  History of Present Injury/Illness (Reason for Referral):    Treatment Assessment:   . Progression/Medical Necessity:   · Skilled intervention continues to be required due to decreased communication with family/caregivers and decreased cognitive skills. Compliance with Program/Exercises: Will assess as treatment progresses}. Reason for Continuation of Services/Other Comments:  · Patient continues to require skilled intervention due to decreased cognition, anomia. Recommendations/Intent for next treatment session: \"Treatment next visit will focus on cognitive, speech tasks\".      Total Treatment Duration:  Time In: 0930  Time Out: [address], MSP, CCC-SLP

## 2019-11-01 NOTE — PROGRESS NOTES
Lex Angela  : 1970  Primary: [employer]  Secondary:  [address]  at [facility]  [address], [address], [place], [facility]  [phone]   [phone]        OUTPATIENT PHYSICAL THERAPY: Daily Treatment Note 2019    ICD-10: Treatment Diagnosis: Unsteadiness on feet (R26.81)  Other lack of coordination (R27.8)  Repeated falls (R29.6)  TREATMENT PLAN:  Effective Dates: 2019 TO 10/29/2019 (90 days). Frequency/Duration: 2 times a week for 90 Day(s)    Pre-treatment Symptoms/Complaints:  Patient reports pain 6/10 in whole body today. Pain: Initial:   6/10 back and both hips Post Session:  6/10   Medications Last Reviewed:  2019  Updated Objective Findings:  See evaluation note from today  TREATMENT:     THERAPEUTIC ACTIVITY: ( 10 minutes): Therapeutic activities per grid below to improve mobility, strength and coordination.          Date:  10/18/19 Date:  10/23/19 Date  10/25/19 Date:  19   Activity/Exercise       Tone reduction techniques        Transfers - to and from mat  Stand pivot transfer with min-mod A X 2 with RW      Bed mobility  Supine to sit with min-mod A X 2    Supine to sidelying with mod A    Sidelying to sit with mod A-max A X1      Sit to stand X 4 with min A-mod A X 2 Multiple times throughout treatment session, min A Multiple times throughout treatment session, min A X 3 reps at vazquez wall, pulling on wall, min A to mod A for safey   Static standing    At half wall 3 x 30 sec with min A to mod A for safety    Standing marching       Ambulation  With rolling walker, 2 person assist and wheelchair follow; 25' X 4; some assist needed for rolling walker management and erect posture In // bars X 2 laps with mod-max A and therapist blocking R knee to prevent buckle    With rolling walker, 2 person assist and w/c follow 20' X 1 with verbal cues for upright posture and help with RW management In // bars X 2 laps with mod-max A and therapist blocking R knee to prevent buckle    With rolling walker, 2 person assist and w/c follow 20' X 1 with verbal cues for upright posture and help with RW management  Patient reported to tired f    Standing in ll bars - tapping foot forward and back   X 5 B with Mod A and cues for upright posture; therapist blocking R knee to prevent buckle X 5 B with Mod A and cues for upright posture; therapist blocking R knee to prevent buckle    Standing in ll bars - tapping foot to side and back   X 5 B with Mod A and cues for upright posture; therapist blocking R knee to prevent buckle X 5 B with Mod A and cues for upright posture;therapist blocking R knee to prevent buckle    Standing weight shifts         therapist blocking R knee to prevent buckle  THERAPEUTIC EXERCISE: ( 30 minutes):  Exercises per grid below to improve mobility, strength and coordination. Date:   10/18/19 Date:  10/23/19 Date  10/25/19 Date:  11/1/19   Activity/Exercise       Single knee to chest        Hamstring stretch   Seated in wheelchair 3 X 30 seconds B Seated in wheelchair 2 X 30 seconds B Patient reports increased pain  Seated in wheelchair 2 x 30 sec with empty end feel    Lower trunk rotation        Long arc Quads  X 10 B with assist from therapist X 10 B with assist from therapist with patient reporting increased pain 2 x 5 reps B in wheelchair through very minimal ROM   Side lying - hip abduction        Side lying - R hip flexor stretch        Seated at edge of mat marching       calf stretch Attempted X2 B, pt exhibited significant increased pain on R LE    Seated in wheelchair 2 x 30 sec B with empty end feel   Ankle pumps    2 x 8 reps B through minimal ROM   Seated marching    2 x 5 reps B through partial ROM   Cervical chin tuck isometrics       Cervical ROM          [facility] Portal  · Treatment/Session Summary:  Patient same overall today. See recert from today.    · Response to Treatment:  no adverse reactions. · Communication/Consultation:  None today   · Equipment provided today:  None today  · Recommendations/Intent for next treatment session: Next visit will focus on improving functional mobility and balance.       Total Treatment Billable Duration:  40 minutes   PT Patient Time In/Time Out  Time In: 0845  Time Out: [facility], T    Future Appointments   Date Time Provider Sara Abdi   11/6/2019  8:45 AM JAKE Peacock [facility]   11/11/2019  8:45 AM JAKE [facility]   11/11/2019  9:30 AM Tahir Das SLP [facility]   11/13/2019  8:45 AM Tahir Das SLP [facility]   11/13/2019  9:30 AM Nedra Marcos DPT [facility]   11/20/2019 10:15 AM Tahir Das SLP [facility] D   11/20/2019 11:00 AM JADA Hicks, OTR/L [facility]

## 2019-11-01 NOTE — PROGRESS NOTES
Lynnette Jalloh  : 1970  Primary: [employer]  Secondary:  [address] at [facility]  [address], [address], [facility], [address] [facility]  [phone]   [phone]         OUTPATIENT PHYSICAL [facility] 30/3/5990    ICD-10: Treatment Diagnosis: Unsteadiness on feet (R26.81)  Other lack of coordination (R27.8)  Repeated falls (R29.6)  Precautions/Allergies:   Patient has no allergy information on record. none per wife report  TREATMENT PLAN:  Effective Dates: 19 to 2019 Frequency/Duration: 2 times a week for 30 Day(s) MEDICAL/REFERRING DIAGNOSIS:  TBI (traumatic brain injury) ([facility] [address] [S06.9X9A]   DATE OF ONSET: 2016  REFERRING PHYSICIAN:  Mishel Pulido MD Orders: evaluate and treat   RETURN PHYSICIAN APPOINTMENT: unknown      ASSESSMENT (Date: 19):  Mr. Dimas Linton has been seen in physical therapy for 21 visits since 19. He has currently met 1/3 of his short term goals and 0/5 of his long term goals. In therapy, we have attempted stretching, strengthening, balance, ambulation, coordination, and functional training. His progress continues to fluctuate depending on how he is feeling that day but he is made very little progress overall. He is most limited by his tremors and further progress is unlikely if there is no relief from the full body tremors, worse on the R side. No tone reducing techniques have worked. There has been no change is his tremors since beginning therapy. He has significant neck and back pain from these tremors but is unable to tolerate any therapy targeting these areas. He requires assistance for all activities due to not being sure how his muscles are going to react at any given time. He has 2 more visits approved through workers comp. If there is no progress made in these 2 visits, I recommend discharge due to lack of progress.               PROBLEM LIST (Impacting functional limitations):  1. Decreased Strength  2. Decreased ADL/Functional Activities  3. Decreased Transfer Abilities  4. Decreased Ambulation Ability/Technique  5. Decreased Balance  6. Decreased Activity Tolerance  7. Decreased [place] with Home Exercise Program INTERVENTIONS PLANNED:  1. Balance Exercise  2. Bed Mobility  3. Family Education  4. Gait Training  5. Home Exercise Program (HEP)  6. Neuromuscular Re-education/Strengthening  7. Therapeutic Activites  8. Therapeutic Exercise/Strengthening  9. Transfer Training     GOALS: (Goals have been discussed and agreed upon with patient.)  Short-Term Functional Goals: Time Frame: 45 days  1. Patient will be compliant with HEP. MET  2. Patient will have an increase on the AMPAC to 14/24 in order to improve functional mobility. NOT MET  3. Patient will demonstrate a stand pivot transfer with supervision in order to improve home mobility. NOT MET  Discharge Goals: Time Frame: 90 days  1. Patient will be independent with HEP. NOT MET  2. Patient will have an increase on the AMPAC to 18/24 in order to improve functional mobility. NOT MET  3. Patient will demonstrate ambulating 48' with the LRAD and supervision in order to improve ability to complete ADLs. NOT MET  4. Patient will demonstrate a car transfer with supervision in order to decrease assistance needed from wife. NOT MET  5. Patient will perform sit to supine with modified independence in order to improve bed mobility. NOT MET    Outcome Measure:             [address] [facility] [address] [address] AM-PAC 6 Clicks         Basic Mobility Inpatient Short Form  How much difficulty does the patient currently have. .. Unable A Lot A Little None   1. Turning over in bed (including adjusting bedclothes, sheets and blankets)? [] 1   [x] 2   [] 3   [] 4   2. Sitting down on and standing up from a chair with arms ( e.g., wheelchair, bedside commode, etc.)   [] 1   [] 2   [x] 3   [] 4   3.   Moving from lying on back to sitting on the side of the bed? [] 1   [x] 2   [] 3   [] 4          How much help from another person does the patient currently need. .. Total A Lot A Little None   4. Moving to and from a bed to a chair (including a wheelchair)? [] 1   [x] 2   [] 3   [] 4   5. Need to walk in hospital room? [] 1   [x] 2   [] 3   [] 4   6. Climbing 3-5 steps with a railing? [] 1   [x] 2   [] 3   [] 4   © 2007, Trustees of [address] [place] [address] [address] [address], under license to [employer]. All rights reserved     Score:  Initial: 11 Most Recent: 13 (Date: 11/1/19 )   Interpretation of Tool:  Represents activities that are increasingly more difficult (i.e. Bed mobility, Transfers, Gait). UPDATED OBJECTIVE FINDINGS:       Mental Status:          alert  Palpation:          increased tone R UE and LE, L UE and LE  Coordination:       impaired LUE, impaired LLE, impaired RUE and impaired RLE  Balance:          decreased static balance, decreased dynamic balance and fair sitting balance. Poor standing balance    Lower Extremity:    Strength PROM   Action R L R  L    Hip Flexion 3- 3     Hip Extension NT NT     Hip Abduction unable NT     Hip Adduction NT NT     Knee Flexion unable 3     Knee Extension 3- 3     Dorsi Flexion unable 3-     Plantar Flexion       Inversion       Eversion                 Functional Mobility:         Gait/Ambulation:  Ambulates with rolling walker, forward flexed trunk, full body tremors, decreased step length, decreased gait speed, 15' min to mod A with 2 person assist for safety           Transfers:  Min A x 2 person, stand pivot transfer with rolling walker. Very unsteady        Bed Mobility:  Mod A, full body tremors throughout. Only able to static sit unsupported for 3-4 minutes before significant full body fatigue and needing to lie down.          Stairs:  NT        Wheelchair:  dependent     Ambulatory/Rehab Services H2 Model Falls Risk Assessment    Risk Factors:       (4)  Confusion/Disorientation/Impulsivity       (2) Symptomatic Depression       (1)  Gender [Male]       (2)  Any administered antiepileptics/anticonvulsants       (5)  History of Recent Falls [w/in 3 months] Ability to Rise from Chair:       (4)  Unable to rise without assistance    Falls Prevention Plan:       Physical Limitations to Exercise (specify):  using a wheelchair       [facility] (specify):  wheelchair and walker   Total: (5 or greater = High Risk): 18    ©2010 [facility] of [employer] States Patent #[id number]. Federal Law prohibits the replication, distribution or use without written permission from [facility] of [employer] Necessity:   · Patient is expected to demonstrate progress in strength, range of motion, balance and functional technique to increase independence with ADLs. · Patient demonstrates poor rehab potential due to higher previous functional level. Reason for Services/Other Comments:  · Patient continues to require modification of therapeutic interventions to increase complexity of exercises. Total Duration:  PT Patient Time In/Time Out  Time In: 0845  Time Out: 2206        Rehabilitation Potential For Stated Goals: Guarded  Regarding [address] therapy, I certify that the treatment plan above will be carried out by a therapist or under their direction.   Thank you for this referral,  ZENAIDA AlonzoT    Referring Physician Signature: Dr. Annette Regan____________________________________________________ Date____________________________________  Mitch Zavala

## 2019-11-06 ENCOUNTER — HOSPITAL ENCOUNTER (OUTPATIENT)
Dept: PHYSICAL THERAPY | Age: 49
Discharge: HOME | End: 2019-11-06
Payer: COMMERCIAL

## 2019-11-06 PROCEDURE — 97110 THERAPEUTIC EXERCISES: CPT

## 2019-11-06 PROCEDURE — 92507 TX SP LANG VOICE COMM INDIV: CPT

## 2019-11-06 NOTE — PROGRESS NOTES
[facility]  : 1970  Primary: [employer]  Secondary:  [address]  at [facility]  [address], [address], [place], [facility]  [phone]   [phone]        OUTPATIENT PHYSICAL THERAPY: Daily Treatment Note 2019    ICD-10: Treatment Diagnosis: Unsteadiness on feet (R26.81)  Other lack of coordination (R27.8)  Repeated falls (R29.6)  TREATMENT PLAN:  Effective Dates: 2019 TO 10/29/2019 (90 days). Frequency/Duration: 2 times a week for 90 Day(s)    Pre-treatment Symptoms/Complaints:  Patient reports pain 10 in whole body today. Pain: Initial:   7/10 back and neck Post Session:  8/10   Medications Last Reviewed:  2019  Updated Objective Findings:  patient with 1+/5 or less for all LE movements today. TREATMENT:     THERAPEUTIC ACTIVITY: ( 0 minutes): Therapeutic activities per grid below to improve mobility, strength and coordination.          Date:  10/23/19 Date  10/25/19 Date:  19 Date:  19   Activity/Exercise       Tone reduction techniques        Transfers - to and from mat        Bed mobility        Sit to stand Multiple times throughout treatment session, min A Multiple times throughout treatment session, min A X 3 reps at vazquez wall, pulling on wall, min A to mod A for safey    Static standing   At half wall 3 x 30 sec with min A to mod A for safety     Standing marching       Ambulation  In // bars X 2 laps with mod-max A and therapist blocking R knee to prevent buckle    With rolling walker, 2 person assist and w/c follow 20' X 1 with verbal cues for upright posture and help with RW management In // bars X 2 laps with mod-max A and therapist blocking R knee to prevent buckle    With rolling walker, 2 person assist and w/c follow 20' X 1 with verbal cues for upright posture and help with RW management  Patient reported to tired f     Standing in ll bars - tapping foot forward and back  X 5 B with Mod A and cues for upright posture; therapist blocking R knee to prevent buckle X 5 B with Mod A and cues for upright posture; therapist blocking R knee to prevent buckle     Standing in ll bars - tapping foot to side and back  X 5 B with Mod A and cues for upright posture; therapist blocking R knee to prevent buckle X 5 B with Mod A and cues for upright posture;therapist blocking R knee to prevent buckle     Standing weight shifts           THERAPEUTIC EXERCISE: ( 40 minutes):  Exercises per grid below to improve mobility, strength and coordination. Date:  10/23/19 Date  10/25/19 Date:  11/1/19 Date:  11/6/19   Activity/Exercise    Parameters   Single knee to chest        Hamstring stretch  Seated in wheelchair 3 X 30 seconds B Seated in wheelchair 2 X 30 seconds B Patient reports increased pain  Seated in wheelchair 2 x 30 sec with empty end feel  Seated 3 x 30 sec with empty end feel   Lower trunk rotation        Long arc Quads X 10 B with assist from therapist X 10 B with assist from therapist with patient reporting increased pain 2 x 5 reps B in wheelchair through very minimal ROM 2 x 5 reps B PROM B- no noted muscle activation    Side lying - hip abduction        Side lying - R hip flexor stretch        Seated at edge of mat marching       calf stretch   Seated in wheelchair 2 x 30 sec B with empty end feel    Ankle pumps   2 x 8 reps B through minimal ROM 2 x 5 reps B PROM- no noted muscle activations   Seated marching   2 x 5 reps B through partial ROM 2 x 5 reps L with 1+/5  X 5 reps R PROM   Seated hip abd/add    X 10 reps B through PROM- no noted muscle activation    Cervical ROM          [employer] Portal  · Treatment/Session Summary:  Patient with very little muscle activation in B LE today. Usually, tremors increase when he tries to perform a volitional movement. There was no noted increased tremors today when patient was asked to perform a movement.   Did not perform standing today due to unsafe if patient has minimal to no LE active movement. · Response to Treatment:  no adverse reactions. · Communication/Consultation:  None today   · Equipment provided today:  None today  · Recommendations/Intent for next treatment session: Next visit will focus on improving functional mobility and balance.       Total Treatment Billable Duration:  40 minutes   PT Patient Time In/Time Out  Time In: 0845  Time Out: [facility], DPT    Future Appointments   Date Time Provider Sara Haquei   11/11/2019  8:45 AM JAKE Barry [facility]O   11/11/2019  9:30 AM Donya Das, SLP [facility]GI D   11/13/2019  8:45 AM Donya Das SLP S[facility]ORPT D   11/20/2019 10:15 AM Donya Das SLP SFDORPERIKA D   11/20/2019 11:00 AM JADA Sandoval, OTR/L [facility]

## 2019-11-07 NOTE — PROGRESS NOTES
OT Note ([id number]): Patient's referring physician (Dr. Naomi Lawson) was called (at [phone]) and a message was left for Dr. Jacoby Mata with recommendations for a power w/c for Mr. Salas Telles' and the need for an order from Dr. Jacoby Mata (an order for power w/c) in order for worker's compensation to pay for one if Dr. Jacoby Mata agrees with this recommendation. This therapist's phone number ([phone]) was left for the physician to call if the physician or his medical assistant had any questions regarding this recommendation.

## 2019-11-11 ENCOUNTER — HOSPITAL ENCOUNTER (OUTPATIENT)
Dept: PHYSICAL THERAPY | Age: 49
Discharge: HOME | End: 2019-11-11
Payer: COMMERCIAL

## 2019-11-11 PROCEDURE — 92507 TX SP LANG VOICE COMM INDIV: CPT

## 2019-11-11 PROCEDURE — 97110 THERAPEUTIC EXERCISES: CPT

## 2019-11-11 PROCEDURE — 97530 THERAPEUTIC ACTIVITIES: CPT

## 2019-11-11 NOTE — PROGRESS NOTES
Jackeline Ortiz  : 1970  Primary: [employer]  Secondary:  [address]  at [facility]  [address], [address], [place], [facility] [address] [facility]  [phone]   [phone]        OUTPATIENT SPEECH LANGUAGE PATHOLOGY: Daily Note: 3    ICD-10: Treatment Diagnosis: cognitive communication deficits R 41.841  REFERRING PHYSICIAN: Tiffanie Ricketts MD MD Orders: speech evaluate and treat  Return Physician Appointment:    PAST MEDICAL HISTORY: TBI, sleep apnea  MEDICAL/REFERRING DIAGNOSIS: TBI (traumatic brain injury) ([facility] [address] [S06.9X9A]  DATE OF ONSET:   PRIOR LEVEL OF FUNCTION: with spouse and children   PRECAUTIONS/ALLERGIES: NKDA   ASSESSMENT:  Pt spoke English at times this date. Pt in good spirits this date. He was more talkative and smiled a lot more. He reported there are some days when he doesn't feel like he can feel his body and he has a hard time thinking those days. He reported other days he feels his body and he can think better. He reported he was thinking better today and apologized for not always knowing the correct answers. Informed him it is not his fault he doesn't always know the answers and that's why he's in therapy. Understanding expressed. He did well with divergent naming this date. However, he did report having a headache 3/4 of the way through which he attributed to concentrating a lot. He was able to recall what he had for breakfast this morning and 2 activities from the weekend. Patient will benefit from skilled intervention to address the above impairments. ?????? ? ? This section established at most recent assessment??????????  PROBLEM LIST (Impairments causing functional limitations):  1. Decreased cognition  2. Anomia   GOALS: (Goals have been discussed and agreed upon with patient.)  SHORT-TERM FUNCTIONAL GOALS: Time Frame: 3 months   1. Pt will complete word finding tasks with 80% accuracy.     2. Pt will complete convergent/divergent naming tasks with 80% accuracy. 3. Pt will complete STM tasks with 80% accuracy. 4. Pt will participate in a full cognitive assessment x1.    5. Pt will complete immediate memory tasks with 80% accuracy. 6. Pt will summarize/paraphrase information read to him with 80% accuracy. 7. Pt will complete sequencing tasks with 80% accuracy. DISCHARGE GOALS: Time Frame: 4-5 months   1. Increased memory and expressive language skills needed for highest level of independent functioning. REHABILITATION POTENTIAL FOR STATED GOALS: FairPLAN OF CARE:  INTERVENTIONS PLANNED: (Benefits and precautions of therapy have been discussed with the patient.)  1. Cognitive tasks  2. Speech tasks  TREATMENT PLAN EFFECTIVE DATES: 10/29/2019 TO 1/28/2020 (90 days). FREQUENCY/DURATION: Continue to follow patient 2 times a week for 90 days to address above goals. Regarding [address] [place] [address] Road therapy, I certify that the treatment plan above will be carried out by a therapist or under their direction. Thank you for this referral,  Carol [address] [address] [facility]                     Referring Physician Signature: Bhavna Keller MD     Date      SUBJECTIVE:  Pt cooperative. Spouse and interpretor present. Present Symptoms: decreased cognition s/p TBI in 2016      Current Medications: see hard chart    Date Last Reviewed: 11/6/19  Social History/Home Situation: residing with spouse and their 5 children      Work/Activity History:  for [employer]     OBJECTIVE:  Objective Measure: Tool Used: National Outcomes Measurement System: Functional Communication Measures: SPOKEN LANGUAGE EXPRESSION  Score:  Initial: 5 Most Recent: 6 (Date: 10/29/19 )   Interpretation of Tool: This measure describes the change in functional communication status subsequent to speech-language pathology treatment of patients who have spoken language expression deficits.   o Level 1:  The individual attempts to speak, but verbalizations are not meaningful to familiar or unfamiliar communication partners at any time. o Level 2:  The individual attempts to speak, although few attempts are accurate or appropriate. The communication partner must assume responsibility for structuring the communication exchange, and with consistent and maximal cueing, the individual can only occasionally produce automatic and/or imitative words and phrases that are rarely meaningful in context.  o Level 3:  The communication partner must assume responsibility for structuring the communication exchange, and with consistent and moderate cueing, the individual can produce words and phrases that are appropriate and meaningful in context.  o Level 4:  The individual is successfully able to initiate communication using spoken language in simple, structured conversations in routine daily activities with familiar communication partners. The individual usually requires moderate cueing, but is able to demonstrate use of simple sentences (i.e., semantics, syntax, and morphology) and rarely uses complex sentences/messages. o Level 5:  The individual is successfully able to initiate communication using spoken language in structured conversations with both familiar and unfamiliar communication partners. The individual occasionally requires minimal cueing to frame more complex sentences in messages. The individual occasionally self-cues when encountering difficulty. o Level 6:  The individual is successfully able to communicate in most activities, but some limitations in spoken language are still apparent in vocational, avocational, and social activities. The individual rarely requires minimal cueing to frame complex sentences. The individual usually self-cues when encountering difficulty. o Level 7: The individuals ability to successfully and independently participate in vocational, avocational, and social activities is not limited by spoken language skills.  Independent functioning may occasionally include use of self-cueing. Score Level 7 Level 6 Level 5 Level 4 Level 3 Level 2 Level 1   Modifier CH CI CJ CK CL CM CN     Tool Used: National Outcomes Measurement System: Functional Communication Measures: MEMORY  Score:  Initial: 4 Most Recent: X (Date: -- )   Interpretation of Tool: This measure describes the change in functional communication status subsequent to speech-language pathology treatment of patients who have memory deficits. o Level 1:  The individual is unable to recall any information, regardless of cueing. o Level 2: The individual consistently requires maximal verbal cues or uses external aids to recall personal information (e.g., family members, biographical information, physical location, etc.) in structured environments. o Level 3: The individual usually requires maximum cues to recall or use external aids for simple routine and personal information (e.g., schedule, names of familiar staff, location of therapy areas, etc.) in structured environments. o Level 4: The individual occasionally requires minimal cues to recall or use external memory aids for simple routine and personal information in structured environments. The individual requires consistent maximal cues to recall or use memory aids for complex and novel information (e.g., carry out multiple steps activities, accommodate schedule changes, anticipate meal times, etc.), plan and follow through on simple future events (e.g., use calendar to keep appointments, use log books to complete a single assignment/task, etc.) in structured environments. o Level 5 The individual consistently requires minimal cues to recall or use external memory aids for complex and novel information.  The individual consistently requires minimal cues to plan and follow through on complex future events (e.g., menu planning and meal preparation, planning a party, etc.).  o Level 6: The individual is able to recall or use external aids/memory strategies for complex information and planning complex future events most of the time. When there is a breakdown in the use of recall/memory strategies/external memory aids, the individual occasionally requires minimal cues. These breakdowns may occasionally interfere with the individuals functioning in vocational, avocational, and social activities. o Level 7: The individual is successful and independent in recalling or using external aids/memory strategies for complex information and planning future events in all vocational, avocational, and social activities. Score Level 7 Level 6 Level 5 Level 4 Level 3 Level 2 Level 1   Modifier CH CI CJ CK CL CM CN       Mental Status:  Alert    Neuro-Linguistics:  Divergent naming (basic): named 3-5 items as requested 90% of the time. Cognitive Skills Activities: Activities/Procedures listed utilized to improve and/or restore cognitive function as related to thought organization. Required moderate verbal cueing to improve improve recall of information. __________________________________________________________________________________________________  History of Present Injury/Illness (Reason for Referral):    Treatment Assessment:   . Progression/Medical Necessity:   · Skilled intervention continues to be required due to decreased communication with family/caregivers and decreased cognitive skills. Compliance with Program/Exercises: Will assess as treatment progresses}. Reason for Continuation of Services/Other Comments:  · Patient continues to require skilled intervention due to decreased cognition, anomia. Recommendations/Intent for next treatment session: \"Treatment next visit will focus on cognitive, speech tasks\".      Total Treatment Duration:  Time In: 0935  Time Out: [address] [facility], [facility] CCC-SLP

## 2019-11-11 NOTE — PROGRESS NOTES
Shaun Ferguson  : 1970  Primary: [employer]  Secondary:  [address] at [facility]  [address] [place], [address] [place] [address], [place] [address] [facility]  [phone]   [phone]         OUTPATIENT PHYSICAL [facility] 2019    ICD-10: Treatment Diagnosis: Unsteadiness on feet (R26.81)  Other lack of coordination (R27.8)  Repeated falls (R29.6)  Precautions/Allergies:   Patient has no allergy information on record. none per wife report  TREATMENT PLAN:  Effective Dates: 19 to 2019 Frequency/Duration: 2 times a week for 30 Day(s) MEDICAL/REFERRING DIAGNOSIS:  [facility] [S06.9X9A]   DATE OF ONSET: 2016  REFERRING PHYSICIAN:  Nuzhat Perez MD Orders: evaluate and treat   RETURN PHYSICIAN APPOINTMENT: unknown      ASSESSMENT (Date: 19):  Mr. Mary Benz has been seen in physical therapy for 23 visits since 19. He has currently met 1/3 of his short term goals and 0/5 of his long term goals. In therapy, we have attempted stretching, strengthening, balance, ambulation, coordination, and functional training. His progress continues to fluctuate depending on how he is feeling that day but he is made very little progress overall. He is most limited by his tremors and further progress is unlikely if there is no relief from the full body tremors, worse on the R side. No tone reducing techniques have worked. There has been no change is his tremors since beginning therapy. He has significant neck and back pain from these tremors but is unable to tolerate any therapy targeting these areas. He requires assistance for all activities due to not being sure how his muscles are going to react at any given time. I recommend discharge at this time due to lack of progress. PROBLEM LIST (Impacting functional limitations):  1. Decreased Strength  2. Decreased ADL/Functional Activities  3.  Decreased Transfer Abilities  4. Decreased Ambulation Ability/Technique  5. Decreased Balance  6. Decreased Activity Tolerance  7. Decreased [place] with Home Exercise Program INTERVENTIONS PLANNED:  1. Balance Exercise  2. Bed Mobility  3. Family Education  4. Gait Training  5. Home Exercise Program (HEP)  6. Neuromuscular Re-education/Strengthening  7. Therapeutic Activites  8. Therapeutic Exercise/Strengthening  9. Transfer Training     GOALS: (Goals have been discussed and agreed upon with patient.)  Short-Term Functional Goals: Time Frame: 45 days  1. Patient will be compliant with HEP. MET  2. Patient will have an increase on the AMPAC to 14/24 in order to improve functional mobility. NOT MET  3. Patient will demonstrate a stand pivot transfer with supervision in order to improve home mobility. NOT MET  Discharge Goals: Time Frame: 90 days  1. Patient will be independent with HEP. NOT MET  2. Patient will have an increase on the AMPAC to 18/24 in order to improve functional mobility. NOT MET  3. Patient will demonstrate ambulating 48' with the LRAD and supervision in order to improve ability to complete ADLs. NOT MET  4. Patient will demonstrate a car transfer with supervision in order to decrease assistance needed from wife. NOT MET  5. Patient will perform sit to supine with modified independence in order to improve bed mobility. NOT MET    Outcome Measure:             [facility] AM-PAC 6 Clicks         Basic Mobility Inpatient Short Form  How much difficulty does the patient currently have. .. Unable A Lot A Little None   1. Turning over in bed (including adjusting bedclothes, sheets and blankets)? [] 1   [x] 2   [] 3   [] 4   2. Sitting down on and standing up from a chair with arms ( e.g., wheelchair, bedside commode, etc.)   [] 1   [] 2   [x] 3   [] 4   3. Moving from lying on back to sitting on the side of the bed?    [] 1   [x] 2   [] 3   [] 4          How much help from another person does the patient currently need. .. Total A Lot A Little None   4. Moving to and from a bed to a chair (including a wheelchair)? [] 1   [x] 2   [] 3   [] 4   5. Need to walk in hospital room? [] 1   [x] 2   [] 3   [] 4   6. Climbing 3-5 steps with a railing? [] 1   [x] 2   [] 3   [] 4   © 2007, Trustees of [facility] [employer], under license to [employer]. All rights reserved     Score:  Initial: 11 Most Recent: 13 (Date: 11/1/19 )   Interpretation of Tool:  Represents activities that are increasingly more difficult (i.e. Bed mobility, Transfers, Gait). UPDATED OBJECTIVE FINDINGS:       Mental Status:          alert  Palpation:          increased tone R UE and LE, L UE and LE  Coordination:       impaired LUE, impaired LLE, impaired RUE and impaired RLE  Balance:          decreased static balance, decreased dynamic balance and fair sitting balance. Poor standing balance    Lower Extremity:    Strength PROM   Action R L R  L    Hip Flexion 3- 3     Hip Extension NT NT     Hip Abduction unable NT     Hip Adduction NT NT     Knee Flexion unable 3     Knee Extension 3- 3     Dorsi Flexion unable 3-     Plantar Flexion       Inversion       Eversion                 Functional Mobility:         Gait/Ambulation:  Ambulates with rolling walker, forward flexed trunk, full body tremors, decreased step length, decreased gait speed, 15' min to mod A with 2 person assist for safety           Transfers:  Min A x 2 person, stand pivot transfer with rolling walker. Very unsteady        Bed Mobility:  Mod A, full body tremors throughout. Only able to static sit unsupported for 3-4 minutes before significant full body fatigue and needing to lie down.          Stairs:  NT        Wheelchair:  dependent     Ambulatory/Rehab Services H2 Model Falls Risk Assessment    Risk Factors:       (4)  Confusion/Disorientation/Impulsivity       (2)  Symptomatic Depression       (1)  Gender [Male]       (2)  Any administered antiepileptics/anticonvulsants       (5)  History of Recent Falls [w/in 3 months] Ability to Rise from Chair:       (4)  Unable to rise without assistance    Falls Prevention Plan:       Physical Limitations to Exercise (specify):  using a wheelchair       Mobility Assistance Device (specify):  wheelchair and walker   Total: (5 or greater = High Risk): 18    ©2010 [facility] [place] [employer] [facility] States Patent #[id number]. Federal Law prohibits the replication, distribution or use without written permission from [facility] of [employer]         Total Duration:  PT Patient Time In/Time Out  Time In: 0845  Time Out: 9151        Rehabilitation Potential For Stated Goals: Guarded  Regarding [address] therapy, I certify that the treatment plan above will be carried out by a therapist or under their direction.   Thank you for this referral,  Michela Nieves DPT      _

## 2019-11-11 NOTE — PROGRESS NOTES
Vanessa Shine  : 1970  Primary: [employer]  Secondary:  [address]  at [facility]  [address], [address], [facility], [address] [facility]  [phone]   [phone]        OUTPATIENT PHYSICAL THERAPY: Daily Treatment Note 2019    ICD-10: Treatment Diagnosis: Unsteadiness on feet (R26.81)  Other lack of coordination (R27.8)  Repeated falls (R29.6)  TREATMENT PLAN:  Effective Dates: 19 to 2019 Frequency/Duration: 2 times a week for 30 Day(s)    Pre-treatment Symptoms/Complaints:  Patient reports pain 10 in whole body today. Says he has more pain today which means he can feel his legs better. Pain: Initial:   6/10 back and neck Post Session:  6/10   Medications Last Reviewed:  2019  Updated Objective Findings:  See evaluation note from today  TREATMENT:     THERAPEUTIC ACTIVITY: (10 minutes): Therapeutic activities per grid below to improve mobility, strength and coordination.          Date:  10/23/19 Date  10/25/19 Date:  19 Date:  19   Activity/Exercise       Tone reduction techniques        Transfers - to and from mat        Bed mobility        Sit to stand Multiple times throughout treatment session, min A Multiple times throughout treatment session, min A X 3 reps at vazquez wall, pulling on wall, min A to mod A for safey X 2 reps at all   Static standing   At half wall 3 x 30 sec with min A to mod A for safety  2 x 2 minutes at half wall with intermittent 1 hand hold    Standing marching    2 x 10 reps at half wall   Ambulation  In // bars X 2 laps with mod-max A and therapist blocking R knee to prevent buckle    With rolling walker, 2 person assist and w/c follow 20' X 1 with verbal cues for upright posture and help with RW management In // bars X 2 laps with mod-max A and therapist blocking R knee to prevent buckle    With rolling walker, 2 person assist and w/c follow 20' X 1 with verbal cues for upright posture and help with RW management  Patient reported to tired f     Standing in ll bars - tapping foot forward and back  X 5 B with Mod A and cues for upright posture; therapist blocking R knee to prevent buckle X 5 B with Mod A and cues for upright posture; therapist blocking R knee to prevent buckle     Standing in ll bars - tapping foot to side and back  X 5 B with Mod A and cues for upright posture; therapist blocking R knee to prevent buckle X 5 B with Mod A and cues for upright posture;therapist blocking R knee to prevent buckle     Standing weight shifts           THERAPEUTIC EXERCISE: ( 30 minutes):  Exercises per grid below to improve mobility, strength and coordination. Date  10/25/19 Date:  11/1/19 Date:  11/6/19 Date:  11/11/19   Activity/Exercise   Parameters Parameters   Single knee to chest        Hamstring stretch  Seated in wheelchair 2 X 30 seconds B Patient reports increased pain  Seated in wheelchair 2 x 30 sec with empty end feel  Seated 3 x 30 sec with empty end feel Seated 2 x 45 sec    Lower trunk rotation        Long arc Quads X 10 B with assist from therapist with patient reporting increased pain 2 x 5 reps B in wheelchair through very minimal ROM 2 x 5 reps B PROM B- no noted muscle activation  2 x 5 reps B PROM    Side lying - hip abduction        Side lying - R hip flexor stretch        Seated at edge of mat marching       calf stretch  Seated in wheelchair 2 x 30 sec B with empty end feel  seated on L 2 x 15 sec    Ankle pumps  2 x 8 reps B through minimal ROM 2 x 5 reps B PROM- no noted muscle activations 2 x 5 reps B active today   Seated marching  2 x 5 reps B through partial ROM 2 x 5 reps L with 1+/5  X 5 reps R PROM 2 x 5 reps B active   Seated hip abd/add   X 10 reps B through PROM- no noted muscle activation  2 x 5 reps B   Cervical ROM          Med[employer] Portal  · Treatment/Session Summary:  Patient much better today. Tremors worse but active movements better.     · Response to Treatment:  no adverse reactions.   · Communication/Consultation:  discharge sent to MD   · Equipment provided today:  None today     Total Treatment Billable Duration:  40 minutes   PT Patient Time In/Time Out  Time In: 0845  Time Out: [facility], DPT    Future Appointments   Date Time Provider Sara Trinidad   11/13/2019  8:45 AM [facility]   11/20/2019 10:15 AM Jose Juan Das SLP [facility]   11/20/2019 11:00 AM JADA South, OTR/L [place]

## 2019-11-13 ENCOUNTER — HOSPITAL ENCOUNTER (OUTPATIENT)
Dept: PHYSICAL THERAPY | Age: 49
Discharge: HOME | End: 2019-11-13
Payer: COMMERCIAL

## 2019-11-13 ENCOUNTER — APPOINTMENT (OUTPATIENT)
Dept: PHYSICAL THERAPY | Age: 49
End: 2019-11-13
Payer: COMMERCIAL

## 2019-11-13 PROCEDURE — 92507 TX SP LANG VOICE COMM INDIV: CPT

## 2019-11-13 NOTE — PROGRESS NOTES
Rebeca Henson  : 1970  Primary: [employer]  Secondary:  [address]  at [facility]  [address], [place], [facility]  [phone]   [phone]        OUTPATIENT SPEECH LANGUAGE PATHOLOGY: Daily Note: 4    ICD-10: Treatment Diagnosis: cognitive communication deficits R 41.841  REFERRING PHYSICIAN: Kierra Hayden MD MD Orders: speech evaluate and treat  Return Physician Appointment:    PAST MEDICAL HISTORY: TBI, sleep apnea  MEDICAL/REFERRING DIAGNOSIS: TBI (traumatic brain injury) ([facility] [address] [S06.9X9A]  DATE OF ONSET:   PRIOR LEVEL OF FUNCTION: with spouse and children   PRECAUTIONS/ALLERGIES: NKDA   ASSESSMENT:  Pt's spouse reported they saw the neurologist yesterday. She reported he didn't have any PT or ST therapy notes. Informed her we sent him a recert XU. She reported they approved the wheelchair for OT. She reported Dr. Lorena Larson made a referral to a dentist and GI. She reported he ordered some test that he was going to be in the hospital for 3 days for because worker's comp ordered it. She reported the neurologist is asking for 24 hours care and to change the current company. She reported the neurologist feels the pt has PD. However, he didn't put him on meds for it as he's waiting for the pt to see the other MD in December. She reported Dr. Lorena Larson felt the pt is on too many meds already. Noted pt will go to [facility] for the EEG (will be there for 3 days) and for dental consult. She reported he will see a GI doctor here. His notes are as follows:   \"1. Continue with your scheduled appointment with the general neurologist. We will follow along to see what recommendations are made. 2. I've prescribed a powered wheelchair. Your occupational therapist will receive this prescription and then I will have to sign a letter after the therapist.  3. Prescription for Tizanidine, 2mg three times per day as needed for muscle spasm. Valium and Flexeril discontinued. 4. I recommend 24hr/day home care as he should never be left unsupervised. Recommend compensation for Mrs. Fredy Ewing (wife) for at least 8 hours/day. 5. Discuss quality of at-home nursing care with your . 6. Referral placed for vEEG as prior order had been accepted for EMU admission; however, later canceled for unknown reasons. 7. Referral to Dentistry for mandibular dysfunction. 8. Referral to GI for upper esophageal evaluation as indicated on MBSS. \"    Pt did well with responsive naming tasks this date. Patient will benefit from skilled intervention to address the above impairments. ?????? ? ? This section established at most recent assessment??????????  PROBLEM LIST (Impairments causing functional limitations):  1. Decreased cognition  2. Anomia   GOALS: (Goals have been discussed and agreed upon with patient.)  SHORT-TERM FUNCTIONAL GOALS: Time Frame: 3 months   1. Pt will complete word finding tasks with 80% accuracy. 2. Pt will complete convergent/divergent naming tasks with 80% accuracy. 3. Pt will complete STM tasks with 80% accuracy. 4. Pt will participate in a full cognitive assessment x1.    5. Pt will complete immediate memory tasks with 80% accuracy. 6. Pt will summarize/paraphrase information read to him with 80% accuracy. 7. Pt will complete sequencing tasks with 80% accuracy. DISCHARGE GOALS: Time Frame: 4-5 months   1. Increased memory and expressive language skills needed for highest level of independent functioning. REHABILITATION POTENTIAL FOR STATED GOALS: FairPLAN OF CARE:  INTERVENTIONS PLANNED: (Benefits and precautions of therapy have been discussed with the patient.)  1. Cognitive tasks  2. Speech tasks  TREATMENT PLAN EFFECTIVE DATES: 10/29/2019 TO 1/28/2020 (90 days). FREQUENCY/DURATION: Continue to follow patient 2 times a week for 90 days to address above goals.   Regarding [address] therapy, I certify that the treatment plan above will be carried out by a therapist or under their direction. Thank you for this referral,  Raj Cuevas, MSP, CCC-SLP         SUBJECTIVE:  Pt cooperative. Spouse and interpretor present. Present Symptoms: decreased cognition s/p TBI in 2016      Current Medications: see hard chart    Date Last Reviewed: 11/13/19  Social History/Home Situation: residing with spouse and their 5 children      Work/Activity History:  for [employer]     OBJECTIVE:  Objective Measure: Tool Used: National Outcomes Measurement System: Functional Communication Measures: SPOKEN LANGUAGE EXPRESSION  Score:  Initial: 5 Most Recent: 6 (Date: 10/29/19 )   Interpretation of Tool: This measure describes the change in functional communication status subsequent to speech-language pathology treatment of patients who have spoken language expression deficits. o Level 1:  The individual attempts to speak, but verbalizations are not meaningful to familiar or unfamiliar communication partners at any time. o Level 2:  The individual attempts to speak, although few attempts are accurate or appropriate. The communication partner must assume responsibility for structuring the communication exchange, and with consistent and maximal cueing, the individual can only occasionally produce automatic and/or imitative words and phrases that are rarely meaningful in context.  o Level 3:  The communication partner must assume responsibility for structuring the communication exchange, and with consistent and moderate cueing, the individual can produce words and phrases that are appropriate and meaningful in context.  o Level 4:  The individual is successfully able to initiate communication using spoken language in simple, structured conversations in routine daily activities with familiar communication partners.  The individual usually requires moderate cueing, but is able to demonstrate use of simple sentences (i.e., semantics, syntax, and morphology) and rarely uses complex sentences/messages. o Level 5:  The individual is successfully able to initiate communication using spoken language in structured conversations with both familiar and unfamiliar communication partners. The individual occasionally requires minimal cueing to frame more complex sentences in messages. The individual occasionally self-cues when encountering difficulty. o Level 6:  The individual is successfully able to communicate in most activities, but some limitations in spoken language are still apparent in vocational, avocational, and social activities. The individual rarely requires minimal cueing to frame complex sentences. The individual usually self-cues when encountering difficulty. o Level 7: The individuals ability to successfully and independently participate in vocational, avocational, and social activities is not limited by spoken language skills. Independent functioning may occasionally include use of self-cueing. Score Level 7 Level 6 Level 5 Level 4 Level 3 Level 2 Level 1   Modifier CH CI CJ CK CL CM CN     Tool Used: National Outcomes Measurement System: Functional Communication Measures: MEMORY  Score:  Initial: 4 Most Recent: X (Date: -- )   Interpretation of Tool: This measure describes the change in functional communication status subsequent to speech-language pathology treatment of patients who have memory deficits. o Level 1:  The individual is unable to recall any information, regardless of cueing. o Level 2: The individual consistently requires maximal verbal cues or uses external aids to recall personal information (e.g., family members, biographical information, physical location, etc.) in structured environments.   o Level 3: The individual usually requires maximum cues to recall or use external aids for simple routine and personal information (e.g., schedule, names of familiar staff, location of therapy areas, etc.) in structured environments. o Level 4: The individual occasionally requires minimal cues to recall or use external memory aids for simple routine and personal information in structured environments. The individual requires consistent maximal cues to recall or use memory aids for complex and novel information (e.g., carry out multiple steps activities, accommodate schedule changes, anticipate meal times, etc.), plan and follow through on simple future events (e.g., use calendar to keep appointments, use log books to complete a single assignment/task, etc.) in structured environments. o Level 5 The individual consistently requires minimal cues to recall or use external memory aids for complex and novel information. The individual consistently requires minimal cues to plan and follow through on complex future events (e.g., menu planning and meal preparation, planning a party, etc.).  o Level 6: The individual is able to recall or use external aids/memory strategies for complex information and planning complex future events most of the time. When there is a breakdown in the use of recall/memory strategies/external memory aids, the individual occasionally requires minimal cues. These breakdowns may occasionally interfere with the individuals functioning in vocational, avocational, and social activities. o Level 7: The individual is successful and independent in recalling or using external aids/memory strategies for complex information and planning future events in all vocational, avocational, and social activities. Score Level 7 Level 6 Level 5 Level 4 Level 3 Level 2 Level 1   Modifier CH CI CJ CK CL CM CN       Mental Status:  Alert    Neuro-Linguistics:  Responsive naming questions: 90%. He missed 1 question due to difficulties with thinking of the answer vs anomia.       Cognitive Skills Activities: Activities/Procedures listed utilized to improve and/or restore cognitive function as related to thought organization. Required moderate verbal cueing to improve improve recall of information. __________________________________________________________________________________________________  History of Present Injury/Illness (Reason for Referral):    Treatment Assessment:   . Progression/Medical Necessity:   · Skilled intervention continues to be required due to decreased communication with family/caregivers and decreased cognitive skills. Compliance with Program/Exercises: Will assess as treatment progresses}. Reason for Continuation of Services/Other Comments:  · Patient continues to require skilled intervention due to decreased cognition, anomia. Recommendations/Intent for next treatment session: \"Treatment next visit will focus on cognitive, speech tasks\".      Total Treatment Duration:            Carol Chan [address] CCC-SLP

## 2019-11-19 NOTE — THERAPY DISCHARGE
Pio Herrmann  : 1970  Primary: [employer]  Secondary:  [address]  at [facility]  [address], [address], [place], [facility]  [phone]   [phone]        OUTPATIENT SPEECH LANGUAGE PATHOLOGY: Daily Note and Discharge Summary    ICD-10: Treatment Diagnosis: cognitive communication deficits R 41.841  REFERRING PHYSICIAN: Dominick Li MD MD Orders: speech evaluate and treat  Return Physician Appointment:    PAST MEDICAL HISTORY: TBI, sleep apnea  MEDICAL/REFERRING DIAGNOSIS: TBI (traumatic brain injury) ([facility] [address] [S06.9X9A]  DATE OF ONSET:   PRIOR LEVEL OF FUNCTION: with spouse and children   PRECAUTIONS/ALLERGIES: NKDA   ASSESSMENT:  Pt has attended 24 sessions due to decreased cognition and anomia. He has done well with naming tasks. Deficits persist with immediate and short term memory. Reviewed strategies for memory such as using a notebook to write down daily activities, reviewing a calendar daily to keep up with time, using repetition and keeping a routine. Understanding expressed by pt and spouse. Recommend discharge at this time as it is felt the pt has met rehab potential.  The pt reported he feels he can focus and think better since having ST. Pt and spouse are in agreement with discharge. His spouse reported this date that since the pt began taking Tizanidine he has become drowsy and irritable. The pt reported since he began taking it he feels dizzy and nauseated when he wakes up. He reported the effects will wear off but by the time they wear off it's time to take it again. His spouse said they haven't told the doctor as they haven't seen him. She reported everything is communicated via the . She reported she told the  yesterday about the medicine side effects. ?????? ? ? This section established at most recent assessment??????????  PROBLEM LIST (Impairments causing functional limitations):  1.  Decreased cognition  2. Anomia   GOALS:   SHORT-TERM FUNCTIONAL GOALS:  1. Pt will complete word finding tasks with 80% accuracy. Goal met. 2. Pt will complete convergent/divergent naming tasks with 80% accuracy. Goal met. 3. Pt will complete STM tasks with 80% accuracy. Goal not met. 4. Pt will participate in a full cognitive assessment x1. Goal discharged. 5. Pt will complete immediate memory tasks with 80% accuracy. Goal not met. 6. Pt will summarize/paraphrase information read to him with 80% accuracy. Goal not targeted. 7. Pt will complete sequencing tasks with 80% accuracy. Goal met. DISCHARGE GOALS:   1. Increased memory and expressive language skills needed for highest level of independent functioning. Goal not met. PLAN OF CARE:  Discharge from [address] [place] [address] Thank you for this referral,  RENUKA Adorno, CCC-SLP         SUBJECTIVE:  Pt cooperative. Spouse and interpretor present. Present Symptoms: decreased cognition s/p TBI in 2016      Current Medications: see hard chart    Date Last Reviewed: 11/20/19  Social History/Home Situation: residing with spouse and their 5 children      Work/Activity History:  for [employer]     OBJECTIVE:  Objective Measure: Tool Used: National Outcomes Measurement System: Functional Communication Measures: SPOKEN LANGUAGE EXPRESSION  Score:  Initial: 5 Most Recent: 6 (Date: 11/20/19 )   Interpretation of Tool: This measure describes the change in functional communication status subsequent to speech-language pathology treatment of patients who have spoken language expression deficits. o Level 1:  The individual attempts to speak, but verbalizations are not meaningful to familiar or unfamiliar communication partners at any time. o Level 2:  The individual attempts to speak, although few attempts are accurate or appropriate.   The communication partner must assume responsibility for structuring the communication exchange, and with consistent and maximal cueing, the individual can only occasionally produce automatic and/or imitative words and phrases that are rarely meaningful in context.  o Level 3:  The communication partner must assume responsibility for structuring the communication exchange, and with consistent and moderate cueing, the individual can produce words and phrases that are appropriate and meaningful in context.  o Level 4:  The individual is successfully able to initiate communication using spoken language in simple, structured conversations in routine daily activities with familiar communication partners. The individual usually requires moderate cueing, but is able to demonstrate use of simple sentences (i.e., semantics, syntax, and morphology) and rarely uses complex sentences/messages. o Level 5:  The individual is successfully able to initiate communication using spoken language in structured conversations with both familiar and unfamiliar communication partners. The individual occasionally requires minimal cueing to frame more complex sentences in messages. The individual occasionally self-cues when encountering difficulty. o Level 6:  The individual is successfully able to communicate in most activities, but some limitations in spoken language are still apparent in vocational, avocational, and social activities. The individual rarely requires minimal cueing to frame complex sentences. The individual usually self-cues when encountering difficulty. o Level 7: The individuals ability to successfully and independently participate in vocational, avocational, and social activities is not limited by spoken language skills. Independent functioning may occasionally include use of self-cueing.   Score Level 7 Level 6 Level 5 Level 4 Level 3 Level 2 Level 1   Modifier CH CI CJ CK CL CM CN     Tool Used: National Outcomes Measurement System: Functional Communication Measures: MEMORY  Score:  Initial: 4 Most Recent: 4 (Date: 11/20/19 )   Interpretation of Tool: This measure describes the change in functional communication status subsequent to speech-language pathology treatment of patients who have memory deficits. o Level 1:  The individual is unable to recall any information, regardless of cueing. o Level 2: The individual consistently requires maximal verbal cues or uses external aids to recall personal information (e.g., family members, biographical information, physical location, etc.) in structured environments. o Level 3: The individual usually requires maximum cues to recall or use external aids for simple routine and personal information (e.g., schedule, names of familiar staff, location of therapy areas, etc.) in structured environments. o Level 4: The individual occasionally requires minimal cues to recall or use external memory aids for simple routine and personal information in structured environments. The individual requires consistent maximal cues to recall or use memory aids for complex and novel information (e.g., carry out multiple steps activities, accommodate schedule changes, anticipate meal times, etc.), plan and follow through on simple future events (e.g., use calendar to keep appointments, use log books to complete a single assignment/task, etc.) in structured environments. o Level 5 The individual consistently requires minimal cues to recall or use external memory aids for complex and novel information. The individual consistently requires minimal cues to plan and follow through on complex future events (e.g., menu planning and meal preparation, planning a party, etc.).  o Level 6: The individual is able to recall or use external aids/memory strategies for complex information and planning complex future events most of the time. When there is a breakdown in the use of recall/memory strategies/external memory aids, the individual occasionally requires minimal cues.   These breakdowns may occasionally interfere with the individuals functioning in vocational, avocational, and social activities. o Level 7: The individual is successful and independent in recalling or using external aids/memory strategies for complex information and planning future events in all vocational, avocational, and social activities. Score Level 7 Level 6 Level 5 Level 4 Level 3 Level 2 Level 1   Modifier CH CI CJ CK CL CM CN       Mental Status:  Alert    Neuro-Linguistics:  Short term memory:   He recalled having breakfast and what he ate from this morning. Orientation: stated it is Tuesday. He reported he didn't know the month. Said he didn't know when given a choice of 3 months. When asked what holiday is next week he said he didn't know. His wife said they do celebrate Thanksgiving. When he was informed it is Thanksgiving next week he stated that is why we are going to school tomorrow to eat with the kids. His wife reported they are having Thanksgiving lunch tomorrow and Friday at school. Immediate memory:   Answering questions re: paragraphs read to him: 70%. Cognitive Skills Activities: Activities/Procedures listed utilized to improve and/or restore cognitive function as related to memory. Required moderate verbal cueing to improve improve recall of information.       Total Treatment Duration:  Time In: 1020  Time Out: Alice Guerrero, MSP, CCC-SLP

## 2019-11-20 ENCOUNTER — HOSPITAL ENCOUNTER (OUTPATIENT)
Dept: PHYSICAL THERAPY | Age: 49
Discharge: HOME | End: 2019-11-20
Payer: COMMERCIAL

## 2019-11-20 PROCEDURE — 97530 THERAPEUTIC ACTIVITIES: CPT

## 2019-11-20 PROCEDURE — 97535 SELF CARE MNGMENT TRAINING: CPT

## 2019-11-20 PROCEDURE — 92507 TX SP LANG VOICE COMM INDIV: CPT

## 2019-11-20 NOTE — THERAPY RECERTIFICATION
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 outdoor portion of power wheelchair mobility assessment   - YES - NO Small space maneuverability (room)   - YES - NO Large space maneuverability (hallway)   - YES - NO Accessing Doorways   COMMENTS:  Patient initially struggled to drive w/c backward, but improved as he practiced - 3 times. He also did well driving over thresholds and adjusting and navigating the wheelchair around people. He drove up/down a fairly steep ramp and made adjustments to tilt as needed. Lastly, he opened automatic doors without cues/difficulty.      OUTDOOR TRAINING:   - YES - NO Adequate safety judgment while outdoors (i.e. Checking for traffic at crosswalks, parking wheelchair within safe distances of objects)   - YES - NO Stop and go concepts while in the wheelchair (i.e. \"stop and go\" verbal instructions, or consistently stopping for Objects)   - YES - NO Directional concepts while in the wheelchair (i.e. moving the joystick in different directions to move the wheelchair in different directions)   - YES - NO Ability to follow directions: (i.e. Following varying verbal commands in a safe and timely manner)   - YES - NO Adequate visual functioning to safely drive outdoors (i.e. Visual attention to environment and ability to avoid obstacles)   - YES - NO Adequate problem solving ability (i.e. Using curb cutouts when available, shortest/safest route without verbal cues)   - YES - NO Ability to use access method with adequate activation, sustained contact, and release (i.e. Demonstrate safe endurance while operating controls, motor control when maneuvering over terrain)   - YES - NO Ability to change drives and or speeds as appropriate for environment (i.e. Decreasing speed when approaching curbs)   - YES - NO Door access   - YES - NO Curb cutout   - YES - NO Sidewalk with irregularities   - YES - NO Crosswalk   - YES - NO Parking within two feet of target   - YES - NO Inclines (up and down)   - YES - NO Endurance for > 25 feet (power wheelchair control)   COMMENTS:        · Bed Mobility:   FUNCTIONAL MOBILITY:To be further assessed. Transfers: Wheelchair to [employer]: Stand pivot transfer to the L with minimal assistance. Mat to wheelchair. Stand pivot transfer to the L with minimal assistance. · Sit to Stand: Minimal assistance.        RANGE OF MOTION  Left  Right Comments:          Upper Extremity  LUE AROM  L Shoulder Flexion: 100  L Shoulder ABduction: 100 RUE AROM  R Shoulder Flexion: 85  R Shoulder ABduction: 85(Reports lateral R neck pain with this motion)           Lower Extremity                STRENGTH  Left Right Comments:   Upper Extremity  L Shoulder Flexion: 4-  L Shoulder ABduction: 4-  L Elbow Flexion: 4-  L Elbow Extension: 3+  L Wrist Extension: 4-  L Digital Flexion: 4-  L Digital Extension: 4-  L Digital Adduction: 4-  L : 4- R Shoulder Flexion: 3-(partly limited due to pain)  R Shoulder ABduction: 3-  R Elbow Flexion: 3-  R Elbow Extension: 3-  R Wrist Extension: 3-  R Digital Flexion: 3-  R Digital Extension: 3-  R Digital adduction: 3  R : 4- Finger to thumb oppposition: Able to complete on R, but not on L; however, limited function due to \"shaking. \"                   ADLs from General Assessment:  Basic ADL  Feeding: Moderate assistance versus Maximum assistance  Oral Facial Hygiene/Grooming: Maximum assistance  Bathing: Maximal assistance versus Total assistance  Upper Body Dressing: Minimal assistance versus Maximum assistance  Lower Body Dressing: Minimal assistance versus Total assistance  Toileting: Maximal assistance versus Total assistance    Instrumental ADL  Meal Preparation: Total assistance  Homemaking: Total assistance  Medication Management: Total assistance  Financial Management: Total assistance     Mat evaluation (10/4/19): Hip width: 15\"; Upper leg length: 18\" (R/L); lower leg length: 17\" (R/L); Shoulder: R: 22\"; L\": 24\"; Top of head: 33\"; Seat to arm: R: 9\"; L: 13\" ; shoulder to shoulder: 20\"; chest width: 13\" ; chest depth: 9\" ; lower arm length: 12\"; Sitting Posture (11/20/19):  Pelvis  Anterior/Posterior Pelvic Tilt: Sacral sits with some flexibility. Lateral pelvic tilt: Decreased active lateral pelvic tilt. Pelvic Obliquity: None noted. Pelvic Rotation: None noted. Spine   Thoracic spine: Kyphotic posture with some flexibility. Lumbar spine: Flattened lumbar curve with some flexibility into extension. Lateral trunk: R trunk shortened. Upper Extremities   Shoulder symmetry: R shoulder protracted and lower than L. Lower Extremities  Hip position: Tends to abduct hips with decreased active adduction. Knees: No overt deformities noted. Ankle/Feet: No overt deformities noted. Head/neck: Decreased active rotation.   PRESSURE MAPPING Maximum Pressure Outcomes   Pressure Mapping #1 (seated edge of mat) 256 mmHG over L/R ischial tuberosities             RECOMMENDATIONS:            SKIN INTEGRITY: No reported wounds currently. None noted all four extremities. EDEMA/GIRTH:  Increased R ankle > L ankle. Wife reports BLE swollen last night. Left Right    Initial Most Recent Initial Most Recent   Lower  Extremity  ankle: (below malleoli: 27 cm)     Ankle (below malleoli: 28 cm)     SENSATION: Light Touch Discrimination: to be further tested  STRENGTH:   LLE Strength/ROM (General): Hip Flexion against gravity:  No  Hip Abd/Adduction against resistance: No  Knee Flex/Extension against gravity: No  Ankle Dorsi/Plantar Flexion against gravity: No RLE Strength/ROM (General): Hip Flexion against gravity:  No  Hip Abd/Adduction against resistance: No  Knee Flex/Extension against gravity: No  Ankle Dorsi/Plantar Flexion against gravity: No   TONE/SPASTICITY:  Hypotonia  QUALITY OF MOVEMENT:  Motor Planning: Poor and Impaired B UE coordination (LUE more decreased than RUE). BALANCE:   Seated (Static): Decreased. Seated (Dynamic): Forward reach of 1\"; Lateral reach of 2-3\". Standing (Static): Requires constant support and minimal assistance. Standing (Dynamic): Poor. Physical Skills Involved:  1. Range of Motion  2. Balance  3. Strength  4. Activity Tolerance  5. Sensation  6. Fine Motor Control  7. Gross Motor Control  8. Vision  9. Pain (acute)  10. Pain (Chronic) Cognitive Skills Affected (resulting in the inability to perform in a timely and safe manner):  1. Perception  2. Executive Function  3. Divided Attention Psychosocial Skills Affected:  1. Habits/Routines  2. Environmental Adaptation  3. Social Interaction  4. Emotional Regulation  5. Social Roles   Number of elements that affect the Plan of Care[de-identified] 5+:  HIGH COMPLEXITY   CLINICAL DECISION MAKING:   Clinical Decision-Making Assessment:  Lex Angela required moderate to maximal verbal, visual, physical or environmental cues to carry out assessment tasks.    Assessment process, impact of co-morbidities, assessment modification\need for assistance, and selection of interventions: Analytical Complexity:HIGH COMPLEXITY

## 2019-11-20 NOTE — PROGRESS NOTES
Mitchell Normanjo  : 1970  Primary: [employer]  Secondary:  [address]  at [facility]  [address], [place], [facility]  [phone]   [phone]      OUTPATIENT OCCUPATIONAL THERAPY: Daily Treatment Note 2019  Visit Count:  20    ICD-10: Treatment Diagnosis:  Unspecified lack of coordination (R27.9); Dependence on wheelchair (Z99.3); History of falling (Z91.81)  Precautions/Allergies:   Patient has no allergy information on record. TREATMENT PLAN:  Effective Dates: 2019 TO 2020 (90 days). Frequency/Duration: 1 times a week for 90 Day(s)     Pre-treatment Symptoms/Complaints: Patient/wife states the physiatrist approved him for a w/c. States their  Priscilla Biswasaway) was at his appointment. Pain: Initial: Pain Intensity 1: (Reports significant pain in his back/back of head)  Post Session:  same/10   Medications Last Reviewed:  2019   Updated Objective Findings:    See 88/88/15 recertification note.    10/11/19:  NDOOR TRAINING ([employer] M3 power w/c):  [x] YES [] NO Cause and effect concepts while in the wheelchair (i.e. Activating a switch causes the wheelchair to move)   [x] YES [] NO Stop and go concepts while in the wheelchair (i.e. \"stop and go\" verbal instructions, or consistently stopping for objects)   [x] YES [] NO Directional concepts while in the wheelchair (i.e. moving the joystick in different directions to move the wheelchair in different directions)   [x] YES [] NO Ability to follow directions: (i.e. Following varying verbal commands in a safe and timely manner)   [x] YES [] NO Adequate visual functioning to safely drive indoors (i.e. Visual attention to environment and ability to avoid obstacles)   [x] YES [] NO Adequate problem solving ability (i.e. Maneuver power wheelchair to designated destination without verbal cues)   [x] YES [] NO Ability to use access method with adequate activation, sustained contact and release (i.e. Able to access switch, control contact, and release when ready to stop wheelchair)   [x] YES [] NO Ability to change drives and or speeds as appropriate for environment (i.e. Decreasing speed in high traffic areas)   [x] YES [] NO Client ready to complete outdoor portion of power wheelchair mobility assessment   [x] YES [] NO Small space maneuverability (room)   [x] YES [] NO Large space maneuverability (hallway)   [x] YES [] NO Accessing Doorways   COMMENTS: Able to drive w/c in reverse with minimal cues        OUTDOOR TRAINING:   [x] YES [] NO Adequate safety judgment while outdoors (i.e. Checking for traffic at crosswalks, parking wheelchair within safe distances of objects)   [x] YES [] NO Stop and go concepts while in the wheelchair (i.e. \"stop and go\" verbal instructions, or consistently stopping for Objects)   [x] YES [] NO Directional concepts while in the wheelchair (i.e. moving the joystick in different directions to move the wheelchair in different directions)   [x] YES [] NO Ability to follow directions: (i.e. Following varying verbal commands in a safe and timely manner)   [x] YES [] NO Adequate visual functioning to safely drive outdoors (i.e. Visual attention to environment and ability to avoid obstacles)   [x] YES [] NO Adequate problem solving ability (i.e. Using curb cutouts when available, shortest/safest route without verbal cues)   [x] YES [] NO Ability to use access method with adequate activation, sustained contact, and release (i.e. Demonstrate safe endurance while operating controls, motor control when maneuvering over terrain)   [x] YES [] NO Ability to change drives and or speeds as appropriate for environment (i.e. Decreasing speed when approaching curbs)   [x] YES [] NO Door access   [x] YES [] NO Curb cutout   [x] YES [] NO Sidewalk with irregularities   [x] YES [] NO Crosswalk   [x] YES [] NO Parking within two feet of target   [x] YES [] NO Inclines (up and down)   [x] YES [] NO Endurance for > 25 feet (power wheelchair control)   COMMENTS:      Mat evaluation (10/4/19): Hip width: 15\"; Upper leg length: 18\" (R/L); lower leg length: 17\" (R/L); Shoulder: R: 22\"; L\": 24\"; Top of head: 33\"; Seat to arm: R: 9\"; L: 13\" ; shoulder to shoulder: 20\"; chest width: 13\" ; chest depth: 9\" ; lower arm length: 12\";   PRESSURE MAPPING Maximum Pressure Outcomes   Pressure Mapping #1 (seated edge of mat) 256 mmHG over L/R ischial tuberosities         RECOMMENDATIONS:        TREATMENT:   Therapeutic Activity: (8  minutes):  Therapeutic activities including Bed transfers, Chair transfers and w/c transfers to improve mobility, strength, balance and coordination.  Required moderate to promote static and dynamic balance in sitting and standing and promote coordination of bilateral, upper extremity(s), trunk. Patient assisted to and from the w/c to mat/mat to w/c utilizing weighted rolling walker with minimal to moderate assistance. He pivoted to the R to transfer to the L due to shaking in his leg to improve independence with functional mobility for ADL. FUNCTIONAL MOBILITY:   Transfers:   Wheelchair to Wheelchair: Stand pivot transfer to the R with minimal to moderate assistance x 2 reps utilizing rolling walker. Sit to Stand: Minimal assistance. Functional weight shift: Moderate assistance to complete functional weight-shifts. Self Care: (30 minutes): Procedure(s) (per grid) utilized to improve and/or restore self-care/home management as related to dressing. Required minimal visual, verbal, manual and tactile cueing to facilitate activities of daily living skills and compensatory activities.   ADLs from ADL Navigator:  Grooming/Bathing/Dressing Activities of Daily Living                           Lower Body Dressing Assistance  Socks: Modified independent  Shoes with Cloth Laces: Minimum assistance  Leg Crossed Method Used: No  Position Performed: Seated edge of bed  Cues: Rob Singh Doff  Adaptive Equipment Used: Long handled shoe horn, Reacher, Sock aid, Dressing stick       Wheelchair Management and Training: (0 minutes): Procedure(s) utilized to improve and/or restore functioning as related to power wheelchair mobility and seating/positioning in power wheelchair. Mr. Roberto Sterling demonstrated ability to complete power tilt as needed for scooting back into w/c for optimal pressure re-distribution. Mr Roberto Sterling practiced lifting/lowering/swinging back the foot plates of power w/c and manual tilt-in-space w/c with a cane in preparation for functional transfer. He also independently swung away the joystick. [email or web address] Portal  Treatment/Session Summary:  Mr. Roberto Sterling did well with donning/doffing shoes/socks and innovatively using adaptive equipment to move feet, etc.  Plan to 's compensation insurance company regarding w/c approval per patient. Plan to get in contact with w/c vendor and set up a time for vendor to be at therapy appointment to collaborate (unsure if worker's compensation has picked a vendor or if it is to per per patient preference). Continue OT per plan of care. · Response to Treatment:  tolerated without complications. · Communication/Consultation:  Progress report sent to MD  · Equipment provided today:  given long handled shoe horn, button hook, reacher, and sock aide  · Recommendations/Intent for next treatment session: Next visit will focus on advancement to more challenging activities. .    Total Treatment Billable Duration:  55 minutes  OT Patient Time In/Time Out  Time In: 1110  Time Out: 1205  Jeanell Hodgkins, OTR/L    Future Appointments   Date Time Provider Sara Abdi   11/26/2019  1:00 PM GERALD Fang/L [facility]

## 2019-11-21 NOTE — PROGRESS NOTES
OT Note ([phone]): Patient's  Abrahan Eastman called by this therapist to inquire about process for obtaining power wheelchair for Mr. Junnie Lombard as ordered by physiatrist.  A message by this therapist was left on Milly's voicemail. Await to hear back from Abrahan Eastman.

## 2019-11-22 ENCOUNTER — HOSPITAL ENCOUNTER (OUTPATIENT)
Dept: PHYSICAL THERAPY | Age: 49
End: 2019-11-22
Payer: COMMERCIAL

## 2019-11-22 NOTE — PROGRESS NOTES
OT Note (1413): This therapist has not heard back from  Laxmi Jesus yet. Patient's  Laxmi Jesus called again by this therapist to inquire about process for obtaining power wheelchair for Mr. Leighann Jesus as ordered by physiatrist.  A message by this therapist was left on Milly's voicemail. Await to hear back from Laxmi Jesus. OT Note: ([id number]): This therapist spoke with Laxmi Jesus around [phone] who states I would need to speak with the insurance company/[employer] to find out process for obtaining Mr. Leighann Jesus a power w/c. Plan to call  at a later time.

## 2019-11-25 NOTE — PROGRESS NOTES
OT Note (4993): This therapist called Meagan Mosher RN with [employer] as directed by Santy Bell RN (patient's ). A message was left for Lolita Roberto to call this therapist back. This therapist stated in message that an order has been received by this therapist for a power wheelchair evaluation. Await to hear back from Lolita Roberto.

## 2019-11-26 ENCOUNTER — HOSPITAL ENCOUNTER (OUTPATIENT)
Dept: PHYSICAL THERAPY | Age: 49
Discharge: HOME | End: 2019-11-26
Payer: COMMERCIAL

## 2019-11-26 PROCEDURE — 97530 THERAPEUTIC ACTIVITIES: CPT

## 2019-11-26 NOTE — PROGRESS NOTES
OT Note (9211): This therapist spoke with aWtson Andersonirene from [employer] who states she is not sure about the order for a power w/c. This therapist told Watson nAdersonirene that this therapist has received orders for a power w/c evaluation from Dr. Yuridia Fraser. Watson Zuleika states she will look for information about the power w/c and asked this therapist to have me send my evaluation to her via fax number [phone]. Jevon Soria they will request DME from their providers (mentioned [facility]). Watson Zuleika states [facility] will contact me regarding what w/c vendor they select to provide it. Plan to fax my assessment to Watson To once completed. Plan to hear back from [address] or whichever supplier contacts me and set up collaborative w/c assessment when that is found out. Plan to set up subsequent appointment with Mr. Shelia Braga in 1-2 weeks as Watson To states they will likely get back to me in a \"day or two. \"

## 2019-11-26 NOTE — PROGRESS NOTES
OT Note (3939): This therapist called Jasmyne from [facility] and [facility] office back. (Voicemail Jasmyne left with this therapist earlier to call her back). Jasmyne states she was wanting to know when the next OT visits would be so they could better schedule Mr. Debora Slainas' caregiver appointments. This therapist stated that I am scheduling Mr. Debora Salinas' appointments one at a time as this therapist is waiting for input from [employer] regarding power w/c as directed by Sae Gilbert (Patient's ). Jasmyne states Jan Whitten with [employer] may be the one to handle this. Jasmyne stated she understands why we are scheduling his appointments one at a time and states she will discuss with  about speeding up the process of acquiring power w/c. Plan to call Jan Whitten with [employer].

## 2019-11-26 NOTE — THERAPY RECERTIFICATION
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 is nonspecific.  Densities are noted in the right upper quadrant.        Impression:    As above.    No acute bony trauma.    CT Head (2/23/19): FINDINGS: The ventricles and sulci are within normal limits for patients age.  There are no attenuation abnormalities to suggest mass lesion, hemorrhage, or acute infarction.  No abnormal extra-axial fluid collections are identified.   Mr. Delvin Barksdale  has no past medical history on file. Mr. Delvin Barksdale  has no past surgical history on file. Per intake form: Anxiety; cerebral vascular disease/stroke; chronic fatigue; difficulty sleeping; dizziness; frequent falls; high colesterol; high BP; joint pain; joint swelling; musculoskeletal injuries; other breathing problems; recurrent headaches; seizures and weakness. Obstructive sleep apnea syndrome in adult;    Dislocation of temporomandibular joint, initial encounter;   Tremor due to disorder of central nervous system  Social History/Living Environment:   lives with wife and several children. ALso has some cousins nearby. family close by.  4 steps to enter. He walks up the steps with wifes help. Prior Level of Function/Work/Activity:  Independent with ADL 3 years ago. Worked as a  [employer] [place]? For 12-13 years. Patient is from [facility] and has a total of 4 weeks in school. He is essentially illiterate with exception of what his wife has taught him. Dominant Side:         RIGHT  Previous Treatment Approaches:          No history of OT/PT/ST prior to 7/31/19. Ambulatory/Rehab Services H2 Model Falls Risk Assessment   Risk Factors:       (4)  Confusion/Disorientation/Impulsivity       (2)  Symptomatic Depression       (1)  Gender [Male]       (2)  Any administered antiepileptics/anticonvulsants       (1)  Visual Impairment [specify:  Decreased occulomotor coordination.]       (5)  History of Recent Falls [w/in 3 months] Ability to Rise from Chair:       (4)  Unable to rise without assistance   Falls Prevention Plan: Mobility Assistance Device (specify):  Currenlty sitting in tilt-in-space wheelchair   Total: (5 or greater = High Risk): 19   ©2010 [facility] . [facility] States Patent #[id number]. Federal Law prohibits the replication, distribution or use without written permission from [facility]   Current Medications:     No current outpatient medications on file. See paper chart. Date Last Reviewed:  11/26/2019   Complexity Level: Extensive review of physical, cognitive, and psychosocial performance (3+):  HIGH COMPLEXITY   ASSESSMENT OF OCCUPATIONAL PERFORMANCE:   HEIGHT: 5'6\" WEIGHT: 186 lbs  GROSS PRESENTATION/POSTURE:   Seated: Sitting in tilt-in-space w/c tilted back. · Standing: Not assessed this date. MENTAL STATUS: Alert and oriented to person. Disoriented to birth date (patient's wife verified). Able to state wife's name.   VISION: Difficulty visually tracking in all quadrants; briefly can track upper R quadrant, but then complains of severe headache following. COGNITION:   · Follows 2-3 step commands. SENSATION: Light Touch Discrimination: Appears impaired RUE and in spots in LUE, but difficult to assess due to decreased cognition   QUALITY OF MOVEMENT:  Speed: slow and Coordination:    Functional reach impaired on R>L with increased tremors with volitional movement. BOWEL/BLADDER:  Denies any bowel/bladder accidents. PULMONARY: Uses CPAP at night. SWALLOWING: Reports he had modified barium swallow test.  Reports occasional difficulty with swallowing: on a mechanical soft diet. Drinks normal consistintency liquids. Drinks smaller amounts at a time. Does not use straws. FUNCTIONAL MOBILITY: Unable to walk without moderate assistance as Per PT 11/2019 note (\"Ambulates with rolling walker, forward flexed trunk, full body tremors, decreased step length, decreased gait speed, 15' min to mod A with 2 person assist for safety\"). Tool Used: Wheelchair propulsion Test (11/26/19): Wheelchair propulsion (on rubber surface in straight line forward): Mr. Urbano Valente attempted to propel an ultra lightweight manual wheelchair utilizing arc propulsion pattern unsuccessfully - BUE too weak and without motor control to complete. Carlota Pass   Speed (m/s): 0 m/s    Push frequency (cycle/s): 0 cycle/second;   Propulsion Effectiveness: Initial: 0 m/cycle  Equipment Trial (Patient trialed Permobil M3 power w/c.):   10/11/19:  Britt [employer] TRAINING (Permobil M3 power w/c):  [x] YES [] NO Cause and effect concepts while in the wheelchair (i.e. Activating a switch causes the wheelchair to move)   [x] YES [] NO Stop and go concepts while in the wheelchair (i.e. \"stop and go\" verbal instructions, or consistently stopping for objects)   [x] YES [] NO Directional concepts while in the wheelchair (i.e. moving the joystick in different directions to move the wheelchair in different directions)   [x] YES [] NO Ability to follow directions: (i.e. Following varying verbal commands in a safe and timely manner)   [x] YES [] NO Adequate visual functioning to safely drive indoors (i.e. Visual attention to environment and ability to avoid obstacles)   [x] YES [] NO Adequate problem solving ability (i.e. Maneuver power wheelchair to designated destination without verbal cues)   [x] YES [] NO Ability to use access method with adequate activation, sustained contact and release (i.e. Able to access switch, control contact, and release when ready to stop wheelchair)   [x] YES [] NO Ability to change drives and or speeds as appropriate for environment (i.e. Decreasing speed in high traffic areas)   [x] YES [] NO Client ready to complete outdoor portion of power wheelchair mobility assessment   [x] YES [] NO Small space maneuverability (room)   [x] YES [] NO Large space maneuverability (hallway)   [x] YES [] NO Accessing Doorways   COMMENTS: Able to drive w/c in reverse with minimal cues        OUTDOOR TRAINING:   [x] YES [] NO Adequate safety judgment while outdoors (i.e. Checking for traffic at crosswalks, parking wheelchair within safe distances of objects)   [x] YES [] NO Stop and go concepts while in the wheelchair (i.e. \"stop and go\" verbal instructions, or consistently stopping for Objects)   [x] YES [] NO Directional concepts while in the wheelchair (i.e. moving the joystick in different directions to move the wheelchair in different directions)   [x] YES [] NO Ability to follow directions: (i.e. Following varying verbal commands in a safe and timely manner)   [x] YES [] NO Adequate visual functioning to safely drive outdoors (i.e. Visual attention to environment and ability to avoid obstacles)   [x] YES [] NO Adequate problem solving ability (i.e. Using curb cutouts when available, shortest/safest route without verbal cues)   [x] YES [] NO Ability to use access method with adequate activation, sustained contact, and release (i.e. Demonstrate safe endurance while operating controls, motor control when maneuvering over terrain)   [x] YES [] NO Ability to change drives and or speeds as appropriate for environment (i.e. Decreasing speed when approaching curbs)   [x] YES [] NO Door access   [x] YES [] NO Curb cutout   [x] YES [] NO Sidewalk with irregularities   [x] YES [] NO Crosswalk   [x] YES [] NO Parking within two feet of target   [x] YES [] NO Inclines (up and down)   [x] YES [] NO Endurance for > 25 feet (power wheelchair control)   COMMENTS:      · Bed Mobility:   Transfers: Wheelchair to [employer]: Stand pivot transfer to the L with moderate assistance. Mat to wheelchair. Stand pivot transfer to the L with moderate assistance. · Sit to Stand: Minimal to moderate assistance. RANGE OF MOTION  Left  Right Comments:          Upper Extremity  LUE AROM  L Shoulder Abduction: 50   RUE AROM  R Shoulder Flexion: 40  R Shoulder ABduction: 40           Lower Extremity                STRENGTH  Left Right Comments:   Upper Extremity  L Shoulder Flexion: 3-  L Shoulder ABduction: 3-  L Elbow Flexion: 4-  L Elbow Extension: 3+  L Wrist Extension: 4-  L Digital Flexion: 4-  L Digital Extension: 4-  L Digital Adduction: 4-  L : 4- R Shoulder Flexion: 3-(partly limited due to pain)  R Shoulder ABduction: 3-  R Elbow Flexion: 3-  R Elbow Extension: 3-  R Wrist Extension: 3-  R Digital Flexion: 3-  R Digital Extension: 3-  R Digital adduction: 3  R : 4- Finger to thumb oppposition: Able to complete on R, but not on L; however, limited function due to \"shaking. \"                   LLE Strength/ROM (General): Hip Flexion against gravity:  No  Hip Abd/Adduction against resistance: No  Knee Flex/Extension against gravity: No  Ankle Dorsi/Plantar Flexion against gravity: No RLE Strength/ROM (General):    Hip Flexion against gravity:  No  Hip Abd/Adduction against resistance: No  Knee Flex/Extension against gravity: No  Ankle Dorsi/Plantar Flexion against gravity: No     ADLs from General Assessment:  Basic ADL  Feeding: Moderate assistance versus Maximum assistance  Oral Facial Hygiene/Grooming: Maximum assistance  Bathing: Maximal assistance versus Total assistance  Upper Body Dressing: Minimal assistance versus Maximum assistance  Lower Body Dressing: Minimal assistance versus Total assistance  Toileting: Maximal assistance versus Total assistance    Instrumental ADL  Meal Preparation: Total assistance  Homemaking: Total assistance  Medication Management: Total assistance  Financial Management: Total assistance     Mat evaluation (10/4/19): Hip width: 15\"; Upper leg length: 18\" (R/L); lower leg length: 17\" (R/L); Shoulder: R: 22\"; L\": 24\"; Top of head: 33\"; Seat to arm: R: 9\"; L: 13\" ; shoulder to shoulder: 20\"; chest width: 13\" ; chest depth: 9\" ; lower arm length: 12\"; Sitting Posture (11/20/19):  Pelvis  Anterior/Posterior Pelvic Tilt: Sacral sits with some flexibility. Lateral pelvic tilt: Decreased active lateral pelvic tilt. Pelvic Obliquity: None noted. Pelvic Rotation: None noted. Spine   Thoracic spine: Kyphotic posture with some flexibility. Lumbar spine: Flattened lumbar curve with some flexibility into extension. Lateral trunk: R trunk shortened. Upper Extremities   Shoulder symmetry: R shoulder protracted and lower than L. Lower Extremities  Hip position: Tends to abduct hips with decreased active adduction. Knees: No overt deformities noted. Ankle/Feet: No overt deformities noted. Head/neck: Decreased active rotation. PRESSURE MAPPING Maximum Pressure Outcomes   Pressure Mapping #1 (seated edge of mat) 256 mmHG over L/R ischial tuberosities             RECOMMENDATIONS:            SKIN INTEGRITY: No reported wounds currently. None noted all four extremities. EDEMA/GIRTH:  Increased R ankle > L ankle. Wife reports BLE swollen last night.      Left Right    Initial Most Recent Initial Most Recent   Lower  Extremity  ankle: (below malleoli: 27 cm) Ankle (below malleoli: 28 cm)     SENSATION: Light Touch Discrimination: Impaired L UE/ L E/Impaired L buttock; Able to feel gross touch L hand (Patient reports he has to make his L LE \"stiff\" to walk due to decreased sensation); able to feel gross touch L hand; Able to feel light touch R hemibody. TONE/SPASTICITY:  Hypotonia  QUALITY OF MOVEMENT:  Motor Planning: Poor and Impaired B UE coordination (LUE more decreased than RUE). BALANCE:   Seated (Static): Decreased. Seated (Dynamic): Impaired: Forward reach of 1\"; Lateral reach of 2-3\". Standing (Static): Requires constant support and minimal assistance. Standing (Dynamic): Poor. Physical Skills Involved:  1. Range of Motion  2. Balance  3. Strength  4. Activity Tolerance  5. Sensation  6. Fine Motor Control  7. Gross Motor Control  8. Vision  9. Pain (acute)  10. Pain (Chronic) Cognitive Skills Affected (resulting in the inability to perform in a timely and safe manner):  1. Perception  2. Executive Function  3. Divided Attention Psychosocial Skills Affected:  1. Habits/Routines  2. Environmental Adaptation  3. Social Interaction  4. Emotional Regulation  5. Social Roles   Number of elements that affect the Plan of Care[de-identified] 5+:  HIGH COMPLEXITY   CLINICAL DECISION MAKING:   Clinical Decision-Making Assessment:  Ilana Gil required moderate to maximal verbal, visual, physical or environmental cues to carry out assessment tasks.    Assessment process, impact of co-morbidities, assessment modification\need for assistance, and selection of interventions: Analytical Complexity:HIGH COMPLEXITY

## 2019-11-26 NOTE — PROGRESS NOTES
Rudy Hanna  : 1970  Primary: [employer] [facility] [employer] [facility]  Secondary:  [address] at [facility]  [address], [address], [place], [address] [facility]  [phone]   [phone]      OUTPATIENT OCCUPATIONAL THERAPY: Daily Treatment Note 2019  Visit Count:  21    ICD-10: Treatment Diagnosis:  Unspecified lack of coordination (R27.9); Dependence on wheelchair (Z99.3); History of falling (Z91.81)  Precautions/Allergies:   Patient has no allergy information on record. TREATMENT PLAN:  Effective Dates: 2019 TO 2020 (90 days). Frequency/Duration: 1 times a week for 90 Day(s)     Pre-treatment Symptoms/Complaints: Refer to OT notes from today - discussed with patient/wife this therapist's discussions with  [address] and [facility] from [facility]  office. Pain: Initial: Pain Intensity 1: (Reports significant pain in back and back of head)  Post Session:  same/10   Medications Last Reviewed:  2019   Updated Objective Findings:    See  recertification note. TREATMENT:   Therapeutic Activity: (40  minutes):  Therapeutic activities including Bed transfers, Chair transfers and w/c transfers to improve mobility, strength, balance and coordination.  Required moderate to promote static and dynamic balance in sitting and standing and promote coordination of bilateral, upper extremity(s), trunk. Patient assisted to and from the w/c to mat/mat to w/c and w/c to w/c utilizing weighted rolling walker with minimal to moderate assistance. He pivoted to the R to transfer to the L due to shaking in his leg to improve independence with functional mobility for ADL. He also pivoted to the L for 2/3 transfers requiring moderate overall assistance.    FUNCTIONAL MOBILITY: Attempted to propel manual w/c (see w/c propulsion in therapy re-certification from today's visit.)   Transfers:   Wheelchair to Wheelchair: Stand pivot transfer to the R with minimal to moderate assistance x 2 reps utilizing rolling walker. Sit to Stand: Minimal assistance. Functional weight shift: Moderate assistance to complete functional weight-shifts. Self Care: (30 minutes): Procedure(s) (per grid) utilized to improve and/or restore self-care/home management as related to dressing. Required minimal visual, verbal, manual and tactile cueing to facilitate activities of daily living skills and compensatory activities. ADLs from ADL Navigator:  Grooming/Bathing/Dressing Activities of Daily Living                                   Wheelchair Management and Training: (0 minutes): Procedure(s) utilized to improve and/or restore functioning as related to power wheelchair mobility and seating/positioning in power wheelchair. Mr. Gracie Mensah demonstrated ability to complete power tilt as needed for scooting back into w/c for optimal pressure re-distribution. Mr Gracie Mensah practiced lifting/lowering/swinging back the foot plates of power w/c and manual tilt-in-space w/c with a cane in preparation for functional transfer. He also independently swung away the joystick. [employer] Portal  Treatment/Session Summary:  Mr. Gracie Mensah would benefit from group 3 power w/c as described in therapy re-certification completed this date. Continue OT per plan of care. · Response to Treatment:  tolerated without complications. · Communication/Consultation:  Recertification note sent to MD.  · Equipment provided today:  given long handled shoe horn, button hook, reacher, and sock aide  · Recommendations/Intent for next treatment session: Next visit will focus on advancement to more challenging activities. .    Total Treatment Billable Duration:  45 minutes  OT Patient Time In/Time Out  Time In: 1300  Time Out: 3205  Malika Etienne OTR/L    Future Appointments   Date Time Provider Sara Abdi   12/5/2019  8:45 AM JADA Schultz OTR/L [facility] CARL

## 2019-12-05 ENCOUNTER — HOSPITAL ENCOUNTER (OUTPATIENT)
Dept: PHYSICAL THERAPY | Age: 49
Discharge: HOME | End: 2019-12-05
Payer: COMMERCIAL

## 2019-12-05 PROCEDURE — 97530 THERAPEUTIC ACTIVITIES: CPT

## 2019-12-05 PROCEDURE — 97542 WHEELCHAIR MNGMENT TRAINING: CPT

## 2019-12-05 NOTE — PROGRESS NOTES
Chasity Males  : 1970  Primary: [employer]  Secondary:  [address] at [facility]  [address], [address], [place], [facility]  [phone]   [phone]      OUTPATIENT OCCUPATIONAL THERAPY: Daily Treatment Note 2019  Visit Count:  22    ICD-10: Treatment Diagnosis:  Unspecified lack of coordination (R27.9); Dependence on wheelchair (Z99.3); History of falling (Z91.81)  Precautions/Allergies:   Patient has no allergy information on record. TREATMENT PLAN:  Effective Dates: 2019 TO 2020 (90 days). Frequency/Duration: 1 times a week for 90 Day(s)     Pre-treatment Symptoms/Complaints:   Pain: Initial: Pain Intensity 1: (did not rate - states took \"pain medicine. \" )  Post Session:  same/10   Medications Last Reviewed:  2019   Updated Objective Findings:    See  recertification note. TREATMENT:   Therapeutic Activity: (8  minutes):  Therapeutic activities including w/c transfers to improve mobility, strength, balance and coordination.  Required minimal to moderate to promote static and dynamic balance in sitting and standing and promote coordination of bilateral, upper extremity(s), trunk. Patient's wife placed gait belt around patient without difficulty. Mr. Mehrdad Swenson completed sit to stand/stand to sit from tilt in space w/c utilizing weighted rolling walker with moderate assistance as needed to \"switch\" out various w/c cushions. Wheelchair Management and Training: (15 minutes): Procedure(s) utilized to improve and/or restore functioning as related to power wheelchair mobility and seating/positioning in power wheelchair. Mr. Mehrdad Swenson trialed sitting on various w/c cushions including ([address] single chamber air cell cushion, Varilite evolution cushion, and Roho hybrid cushion). Mr. Mehrdad Swenson' reported the most comfort with the [address] [facility] M2 cushion.    [employer] Karma Portal  Treatment/Session Summary:   Monique Hunter liked the original power w/c cushion that we trialed on the previous visit ([address] [facility] M2). This therapist discussed Mr. Monique Hunter' need for a power w/c with Driss Mckeon with [facility]. Shey Colindres stated she or the worker's compensation vendor will contact this therapist via e-mail or phone regarding which vendor is selected for the w/c so I can work with them to get a w/c spec'd out. Continue OT per plan of care. · Response to Treatment:  tolerated without complications. · Communication/Consultation:  Recertification note sent to MD.  · Equipment provided today:  given long handled shoe horn, button hook, reacher, and sock aide  · Recommendations/Intent for next treatment session: Next visit will focus on advancement to more challenging activities. .    Total Treatment Billable Duration:  45 minutes  OT Patient Time In/Time Out  Time In: 0845  Time Out: 0930  Dariusz Ellis OTR/L    Future Appointments   Date Time Provider Sara Abdi   12/12/2019  9:30 AM JADA Mary, OTR/L [facility] CARL

## 2019-12-12 ENCOUNTER — HOSPITAL ENCOUNTER (OUTPATIENT)
Dept: PHYSICAL THERAPY | Age: 49
Discharge: HOME | End: 2019-12-12
Payer: COMMERCIAL

## 2019-12-12 NOTE — PROGRESS NOTES
OT appointment cancelled by this therapist as patient only has one more approved visit. Zohaib Bruner from [employer] company returned this therapist's e-mail stating she is waiting on a quote from "[employer]s for the w/c. Plan to re-schedule OT appointment for 3 weeks and hopefully by that time a w/c supplier will bee identified to work with this therapist on w/c specifications. Patient/patient's wife in agreement with this plan. Interpretors Bronson Aguilar and Broderick Greco present for today's session.

## 2019-12-16 NOTE — PROGRESS NOTES
1215: This OT talked with Ksenia Goodwin with [employer] stated she recently received orders from \"[employer]\" orders for Mr. Ulysses Pantoja' power w/c and requested OT w/c evaluation for Mr. Ulysses Pantoja. OT w/c evaluation faxed to [phone]. Yolette Iglesias is aware Mr. Ulysses Pantoja has an appointment on 1/7/20 at 0930 AM and plans to send a contracted ATP to OT appointment on that date. Plan to await on Rachael to confirm an ATP can come to that appointment.

## 2020-01-03 NOTE — PROGRESS NOTES
0820: This OT talked with Tova Little ATP with [employer]\" regarding receiving a request for supplying a power w/c for Mr. Danielle Ospina. Veronica Tinocoradha states he will not be able to make the appointment on the 7th of January, but will be able to make an appointment on the 10th at 100 or the 22nd at 100. This therapist plans to call Veronica Evansbernie back when Mr. Danielle Ospina confirms he can come to an appointment at one of the dates/times. 7960: This OT left a message with Mr. Danielle Ospina on the preferred supplied phone number through [facility] ([phone]) via [facility] through interpreting line. [facility] left a message on the preferred phone number stating the appointment on the 7th will need to be cancelled and requesting to change the appointment to the 10th at 100 PM or 230 PM.  Plan to await phone call from Mr. Slade/family to confirm change in appointment time.

## 2020-01-07 ENCOUNTER — HOSPITAL ENCOUNTER (OUTPATIENT)
Dept: PHYSICAL THERAPY | Age: 50
Discharge: HOME | End: 2020-01-07
Payer: COMMERCIAL

## 2020-01-07 NOTE — PROGRESS NOTES
OT Note: Patient's appointment today was cancelled and re-scheduled for January 22nd at 100 PM as the worker's compensation selected w/c vendor/ATP was unavailable for this date. Patient and KENNETH Medina confirmed he will be at the January 22nd appointment. Plan to follow up with patient at that time.

## 2020-01-22 ENCOUNTER — HOSPITAL ENCOUNTER (OUTPATIENT)
Dept: PHYSICAL THERAPY | Age: 50
Discharge: HOME | End: 2020-01-22
Payer: COMMERCIAL

## 2020-01-22 PROCEDURE — 97542 WHEELCHAIR MNGMENT TRAINING: CPT

## 2020-01-22 PROCEDURE — 97530 THERAPEUTIC ACTIVITIES: CPT

## 2020-01-22 NOTE — THERAPY RECERTIFICATION
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 walker but she has to help him. He can't walk on his own. Doesn't use the wheelchair in the house. He fell at Dr. Aj Espinosa office. He had another fall with home health nurse. No home therapy. Since the accident the tremors have gotten worse. Past Medical History/Comorbidities:   Below imaging as per [address] [place], [place] [address]now [facility]) medical records per [facility]":  CT Cervical Spine (8/4/19): IMPRESSION:    NO FRACTURE OR DISLOCATION IN THE CERVICAL SPINE  X-ray thoracic spine (8/4/16): FINDINGS:    .  No vertebral malalignment.    .  No evidence of acute fracture.    Multiple a geometric densities projecting over the right upper quadrant of the abdomen are nonspecific and could be external to the patient versus represent  pathologic calcifications.    The cervicothoracic junction is obscured on the lateral view by overlapping anatomy.   X-ray Lumbar spine (8/4/16):   Lumbar spine series:  4 views including AP, lateral, and coned-down views of the lumbosacral junction.  No prior similar studies        Mild anterior spondylolisthesis of L5 on S1 is demonstrated.  Possible pars defects are suggested at the L5 level.  Mild posterior spondylolisthesis of L4 on L5 is seen.  The bowel gas pattern is nonspecific.  Densities are noted in the right upper quadrant.        Impression:    As above.    No acute bony trauma.    CT Head (2/23/19): FINDINGS: The ventricles and sulci are within normal limits for patients age.  There are no attenuation abnormalities to suggest mass lesion, hemorrhage, or acute infarction.  No abnormal extra-axial fluid collections are identified.   Mr. Mackenzie Moreno  has no past medical history on file. Mr. Mackenzie Moreno  has no past surgical history on file. Per intake form: Anxiety; cerebral vascular disease/stroke; chronic fatigue; difficulty sleeping; dizziness; frequent falls; high colesterol; high BP; joint pain; joint swelling; musculoskeletal injuries; other breathing problems; recurrent headaches; seizures and weakness. Obstructive sleep apnea syndrome in adult; Dislocation of temporomandibular joint, initial encounter;   Tremor due to disorder of central nervous system  Social History/Living Environment:   lives with wife and several children. ALso has some cousins nearby. family close by.  4 steps to enter. He walks up the steps with wifes help. Prior Level of Function/Work/Activity:  Independent with ADL 3 years ago. Worked as a  [employer]? For 12-13 years. Patient is from [facility] and has a total of 4 weeks in school. He is essentially illiterate with exception of what his wife has taught him. Dominant Side:         RIGHT  Previous Treatment Approaches:          No history of OT/PT/ST prior to 7/31/19.     Ambulatory/Rehab Services H2 Model Falls Risk Assessment   Risk Factors:       (4)  Confusion/Disorientation/Impulsivity       (2)  Symptomatic Depression (1)  Gender [Male]       (2)  Any administered antiepileptics/anticonvulsants       (1)  Visual Impairment [specify:  Decreased occulomotor coordination.]       (5)  History of Recent Falls [w/in 3 months] Ability to Rise from Chair:       (4)  Unable to rise without assistance   Falls Prevention Plan: Mobility Assistance Device (specify):  Currenlty sitting in tilt-in-space wheelchair   Total: (5 or greater = High Risk): 19   ©2010 [facility] of [employer] States Patent #[id number]. Federal Law prohibits the replication, distribution or use without written permission from [facility] [employer]   Current Medications:     No current outpatient medications on file. See paper chart. Date Last Reviewed:  1/22/2020   Complexity Level: Extensive review of physical, cognitive, and psychosocial performance (3+):  HIGH COMPLEXITY   ASSESSMENT OF OCCUPATIONAL PERFORMANCE:   HEIGHT: 5'6\" WEIGHT: 186 lbs  GROSS PRESENTATION/POSTURE:   Seated: Sitting in tilt-in-space w/c tilted back. · Standing: Not assessed this date. MENTAL STATUS: Alert and oriented to person. Disoriented to birth date (patient's wife verified). Able to state wife's name. VISION: Difficulty visually tracking in all quadrants; briefly can track upper R quadrant, but then complains of severe headache following. COGNITION:   · Follows 2-3 step commands. SENSATION: Light Touch Discrimination: Appears impaired RUE and in spots in LUE, but difficult to assess due to decreased cognition   QUALITY OF MOVEMENT:  Speed: slow and Coordination:    Functional reach impaired on R>L with increased tremors with volitional movement. BOWEL/BLADDER:  Denies any bowel/bladder accidents. PULMONARY: Uses CPAP at night. SWALLOWING: Reports he had modified barium swallow test.  Reports occasional difficulty with swallowing: on a mechanical soft diet. Drinks normal consistintency liquids. Drinks smaller amounts at a time.   Does not use straws. FUNCTIONAL MOBILITY: Unable to walk without moderate assistance as Per PT 11/2019 note (\"Ambulates with rolling walker, forward flexed trunk, full body tremors, decreased step length, decreased gait speed, 15' min to mod A with 2 person assist for safety\"). Tool Used: Wheelchair propulsion Test (11/26/19): Wheelchair propulsion (on rubber surface in straight line forward): Mr. Kylah Ward attempted to propel an ultra lightweight manual wheelchair utilizing arc propulsion pattern unsuccessfully - BUE too weak and without motor control to complete. Emma Brito   Speed (m/s): 0 m/s    Push frequency (cycle/s): 0 cycle/second;   Propulsion Effectiveness: Initial: 0 m/cycle  Equipment Trial (Patient trialed [employer] M3 power w/c.):   10/11/19:  Francisca Salas TRAINING ([employer] M3 power w/c):  [x] YES [] NO Cause and effect concepts while in the wheelchair (i.e. Activating a switch causes the wheelchair to move)   [x] YES [] NO Stop and go concepts while in the wheelchair (i.e. \"stop and go\" verbal instructions, or consistently stopping for objects)   [x] YES [] NO Directional concepts while in the wheelchair (i.e. moving the joystick in different directions to move the wheelchair in different directions)   [x] YES [] NO Ability to follow directions: (i.e. Following varying verbal commands in a safe and timely manner)   [x] YES [] NO Adequate visual functioning to safely drive indoors (i.e. Visual attention to environment and ability to avoid obstacles)   [x] YES [] NO Adequate problem solving ability (i.e. Maneuver power wheelchair to designated destination without verbal cues)   [x] YES [] NO Ability to use access method with adequate activation, sustained contact and release (i.e. Able to access switch, control contact, and release when ready to stop wheelchair)   [x] YES [] NO Ability to change drives and or speeds as appropriate for environment (i.e. Decreasing speed in high traffic areas)   [x] YES [] NO Client ready to complete outdoor portion of power wheelchair mobility assessment   [x] YES [] NO Small space maneuverability (room)   [x] YES [] NO Large space maneuverability (hallway)   [x] YES [] NO Accessing Doorways   COMMENTS: Able to drive w/c in reverse with minimal cues        OUTDOOR TRAINING:   [x] YES [] NO Adequate safety judgment while outdoors (i.e. Checking for traffic at crosswalks, parking wheelchair within safe distances of objects)   [x] YES [] NO Stop and go concepts while in the wheelchair (i.e. \"stop and go\" verbal instructions, or consistently stopping for Objects)   [x] YES [] NO Directional concepts while in the wheelchair (i.e. moving the joystick in different directions to move the wheelchair in different directions)   [x] YES [] NO Ability to follow directions: (i.e. Following varying verbal commands in a safe and timely manner)   [x] YES [] NO Adequate visual functioning to safely drive outdoors (i.e. Visual attention to environment and ability to avoid obstacles)   [x] YES [] NO Adequate problem solving ability (i.e. Using curb cutouts when available, shortest/safest route without verbal cues)   [x] YES [] NO Ability to use access method with adequate activation, sustained contact, and release (i.e. Demonstrate safe endurance while operating controls, motor control when maneuvering over terrain)   [x] YES [] NO Ability to change drives and or speeds as appropriate for environment (i.e. Decreasing speed when approaching curbs)   [x] YES [] NO Door access   [x] YES [] NO Curb cutout   [x] YES [] NO Sidewalk with irregularities   [x] YES [] NO Crosswalk   [x] YES [] NO Parking within two feet of target   [x] YES [] NO Inclines (up and down)   [x] YES [] NO Endurance for > 25 feet (power wheelchair control)   COMMENTS:      · Bed Mobility:   Transfers: Wheelchair to [employer]: Stand pivot transfer to the L with moderate assistance. Mat to wheelchair.  Stand pivot transfer to the L with moderate assistance. · Sit to Stand: Minimal to moderate assistance. RANGE OF MOTION  Left  Right Comments:          Upper Extremity  LUE AROM  L Shoulder Abduction: 50   RUE AROM  R Shoulder Flexion: 40  R Shoulder ABduction: 40           Lower Extremity                STRENGTH  Left Right Comments:   Upper Extremity  L Shoulder Flexion: 3-  L Shoulder ABduction: 3-  L Elbow Flexion: 4-  L Elbow Extension: 3+  L Wrist Extension: 4-  L Digital Flexion: 4-  L Digital Extension: 4-  L Digital Adduction: 4-  L : 4- R Shoulder Flexion: 3-(partly limited due to pain)  R Shoulder ABduction: 3-  R Elbow Flexion: 3-  R Elbow Extension: 3-  R Wrist Extension: 3-  R Digital Flexion: 3-  R Digital Extension: 3-  R Digital adduction: 3  R : 4- Finger to thumb oppposition: Able to complete on R, but not on L; however, limited function due to \"shaking. \"                   LLE Strength/ROM (General): Hip Flexion against gravity:  No  Hip Abd/Adduction against resistance: No  Knee Flex/Extension against gravity: No  Ankle Dorsi/Plantar Flexion against gravity: No RLE Strength/ROM (General): Hip Flexion against gravity:  No  Hip Abd/Adduction against resistance: No  Knee Flex/Extension against gravity: No  Ankle Dorsi/Plantar Flexion against gravity: No     ADLs from General Assessment:  Basic ADL  Feeding: Moderate assistance versus Maximum assistance  Oral Facial Hygiene/Grooming: Maximum assistance  Bathing: Maximal assistance versus Total assistance  Upper Body Dressing: Minimal assistance versus Maximum assistance  Lower Body Dressing: Minimal assistance versus Total assistance  Toileting: Maximal assistance versus Total assistance    Instrumental ADL  Meal Preparation: Total assistance  Homemaking: Total assistance  Medication Management: Total assistance  Financial Management: Total assistance     Mat evaluation (10/4/19): Hip width: 15\"; Upper leg length: 18\" (R/L); lower leg length: 17\" (R/L);  Shoulder: R: 22\"; L\": 24\"; Top of head: 33\"; Seat to arm: R: 9\"; L: 13\" ; shoulder to shoulder: 20\"; chest width: 13\" ; chest depth: 9\" ; lower arm length: 12\"; Sitting Posture (11/20/19):  Pelvis  Anterior/Posterior Pelvic Tilt: Sacral sits with some flexibility. Lateral pelvic tilt: Decreased active lateral pelvic tilt. Pelvic Obliquity: None noted. Pelvic Rotation: None noted. Spine   Thoracic spine: Kyphotic posture with some flexibility. Lumbar spine: Flattened lumbar curve with some flexibility into extension. Lateral trunk: R trunk shortened. Upper Extremities   Shoulder symmetry: R shoulder protracted and lower than L. Lower Extremities  Hip position: Tends to abduct hips with decreased active adduction. Knees: No overt deformities noted. Ankle/Feet: No overt deformities noted. Head/neck: Decreased active rotation. PRESSURE MAPPING Maximum Pressure Outcomes   Pressure Mapping #1 (seated edge of mat) 256 mmHG over L/R ischial tuberosities             RECOMMENDATIONS:            SKIN INTEGRITY: No reported wounds currently. None noted all four extremities. EDEMA/GIRTH:  Increased R ankle > L ankle. Wife reports BLE swollen last night. Left Right    Initial Most Recent Initial Most Recent   Lower  Extremity  ankle: (below malleoli: 27 cm)     Ankle (below malleoli: 28 cm)     SENSATION: Light Touch Discrimination: Impaired L UE/ L E/Impaired L buttock; Able to feel gross touch L hand (Patient reports he has to make his L LE \"stiff\" to walk due to decreased sensation); able to feel gross touch L hand; Able to feel light touch R hemibody. TONE/SPASTICITY:  Hypotonia  QUALITY OF MOVEMENT:  Motor Planning: Poor and Impaired B UE coordination (LUE more decreased than RUE). BALANCE:   Seated (Static): Decreased. Seated (Dynamic): Impaired: Forward reach of 1\"; Lateral reach of 2-3\". Standing (Static): Requires constant support and minimal assistance. Standing (Dynamic): Poor.        Physical Skills Involved:  1. Range of Motion  2. Balance  3. Strength  4. Activity Tolerance  5. Sensation  6. Fine Motor Control  7. Gross Motor Control  8. Vision  9. Pain (acute)  10. Pain (Chronic) Cognitive Skills Affected (resulting in the inability to perform in a timely and safe manner):  1. Perception  2. Executive Function  3. Divided Attention Psychosocial Skills Affected:  1. Habits/Routines  2. Environmental Adaptation  3. Social Interaction  4. Emotional Regulation  5. Social Roles   Number of elements that affect the Plan of Care[de-identified] 5+:  HIGH COMPLEXITY   CLINICAL DECISION MAKING:   Clinical Decision-Making Assessment:  Vida Meza required moderate to maximal verbal, visual, physical or environmental cues to carry out assessment tasks.    Assessment process, impact of co-morbidities, assessment modification\need for assistance, and selection of interventions: Analytical Complexity:HIGH COMPLEXITY

## 2020-01-22 NOTE — PROGRESS NOTES
Ena Blandon  : 1970  Primary: [employer]  Secondary:  [address]  at [facility]  [address], [address], [place] [address] [address] [facility]  [phone]   [phone]      OUTPATIENT OCCUPATIONAL THERAPY: Daily Treatment Note 2020  Visit Count:  23    ICD-10: Treatment Diagnosis:  Unspecified lack of coordination (R27.9); Dependence on wheelchair (Z99.3); History of falling (Z91.81)  Precautions/Allergies:   Patient has no allergy information on record. TREATMENT PLAN:  Effective Dates: 2019 TO 2020 (90 days). Frequency/Duration: 1 times a week for 90 Day(s)     Pre-treatment Symptoms/Complaints: Interpretor Carrie Hernandez) present. Patient's wife states patient fell a couple days ago. She states a new nurse was helping him. Pain: Initial: Pain Intensity 1: (did not rate)  Post Session:  same/10   Medications Last Reviewed:  2020   Updated Objective Findings:    See  recertification note. TREATMENT:   Therapeutic Activity: (15  minutes):  Therapeutic activities including Bed transfers, Chair transfers and w/c transfers to improve mobility, strength, balance and coordination.  Required moderate to promote static and dynamic balance in sitting and standing and promote coordination of bilateral, upper extremity(s), trunk. Patient assisted to and from manual w/c to mat/mat to power w/c and power w/c to manual w/c utilizing weighted rolling walker with minimal to moderate assistance. He pivoted to the R to transfer due to the L due to shaking in his leg to improve independence with functional mobility for ADL. Patient also was given a gait belt and patient's wife independently donned/doffed it with good safety. Transfers:   Wheelchair to Wheelchair: Stand pivot transfer to the R with minimal to moderate assistance x 2 reps utilizing rolling walker. Sit to Stand: Minimal moderate assistance.   Functional weight shift: Moderate assistance to complete functional weight-shifts. Wheelchair Management and Training: (60 minutes): Procedure(s) utilized to improve and/or restore functioning as related to power wheelchair mobility and seating/positioning in power wheelchair. Mr. Wayne Moody demonstrated ability to complete power tilt, recline, elevating leg rests and seat elevation as needed for scooting back into w/c, B LE edema control and for functional reaching and for optimal pressure re-distribution. Mr Wayne Moody also independently swung away the joystick. Mr. Steffi Carson also independently drove power w/c in his indoor environment without significant difficulty. [facility]  Treatment/Session Summary:  Mr. Wayne Moody would benefit from group 3 power w/c as described in therapy re-certification completed this date. KENNETH Walter/ROYCE was present today and is to assist patient with obtaining. Today's OT recetification note sent to him. Once approved, he may deliver it to his home. No further need for outpatient OT services at this time. · Response to Treatment:  tolerated without complications. · Communication/Consultation:  Recertification note sent to MD and w/c vendor. · Equipment provided today:  Given gait belt. · Recommendations/Intent for next treatment session: Next visit will focus on advancement to more challenging activities. .    Total Treatment Billable Duration:  75 minutes  OT Patient Time In/Time Out  Time In: 1315  Time Out: 29 [facility] Keegan, OTD, OTR/L    No future appointments.

## 2020-03-18 NOTE — PROGRESS NOTES
Date: 3/18/2020  OT Note: Patient's worker's compensation DME company ([employer] phone number [phone]) called asking about conversation with Aliya Whitt from [facility] this therapist had (therapist told Aliya Whitt that a power w/c was recommended, not a manual w/c). Camden Trejo' stated she contacted [facility] and they are in process of getting a loaner manual and power w/c for patient. This therapist also e-mailed Camden Trejo' w/c specifications and w/c quote from KENNETH Martínez.    Camden Trejo' stated she needed no further information from this therapist.

## 2020-04-03 NOTE — PROGRESS NOTES
Date: 4/3/2020  OT Note: Patient's OT evaluation sent to selected DME company ([employer]). This therapist also e-mailed KENNETH Dang w/c specifications and w/c quote from KENNETH Aquino. Toño Kee stated he has delivered a [employer] power w/c to Mr. Montero Ed yo.

## 2021-09-09 NOTE — PROGRESS NOTES
Suzette Carter  : 1970  Primary: [employer]  Secondary:  [address] at [facility]  [address], [address], [place], [facility]  [phone]   [phone]      OUTPATIENT OCCUPATIONAL THERAPY: Daily Treatment Note 2019  Visit Count:  5    ICD-10: Treatment Diagnosis:  Unspecified lack of coordination (R27.9); Dependence on wheelchair (Z99.3); History of falling (Z91.81)  Precautions/Allergies:   Patient has no allergy information on record. TREATMENT PLAN:  Effective Dates: 2019 TO 11/3/2019 (90 days). Frequency/Duration: 2 times a week for 90 Day(s)    Pre-treatment Symptoms/Complaints: Interpretor \"Sedrick\" present. Patient's wife states an urgent care doctor Bari Gamino MD at [facility]") in 2016 after the initial injury told her that the back of Mr. Belén Maurice neck was \"out of place\" and they couldn't move him very much. Patient states he is interested in a power w/c to see if it would help him move around on his own without the help of his family. Pain: Initial: Pain Intensity 1: 10  Pain Location 1: Hip, Head(back of head)  Post Session:  same/10   Medications Last Reviewed:  2019   Updated Objective Findings:  None Today   TREATMENT:     Therapeutic Activity: (    38 minutes): Therapeutic activities including Bed transfers, Chair transfers and w/c transfers to improve mobility, strength, balance and coordination. Required moderate   to promote static and dynamic balance in sitting and promote coordination of bilateral, upper extremity(s), trunk. Patient sat edge of mat ~15 minutes prior to asking to be assisted to supine. Patient assisted to supine and placed on wedge/pillows under head/pillows under arms/and roll underneath knees for comfort. FUNCTIONAL MOBILITY:   Transfers:   Wheelchair to [employer]: Stand pivot transfer to the L with moderate assistance. Mat to wheelchair.  Stand pivot transfer to the R with moderate Referring Provider (Optional): Alvarado Sims MD assistance. Sit to Stand: Moderate assistance. Supine to sit: Minimal assistance. Sit to supine: Moderate assistance. [facility]  Treatment/Session Summary:  Patient demonstrated significant R ashli-body tremors which impedes his overall functional mobility/safety with transfers. He may benefit from a power w/c with joystick on the L (due to decreased LUE tremors versus the RUE). Plan to trial power w/c upon next session and see how he does/if he is safe driving a power w/c prior to further pursuing. Patient/wife is in agreement with this plan. Continue OT per plan of care. · Response to Treatment:  tolerated without complications. · Communication/Consultation:  None today  · Equipment provided today:  None today  · Recommendations/Intent for next treatment session: Next visit will focus on advancement to more challenging activities. .    Total Treatment Billable Duration:  38 minutes  OT Patient Time In/Time Out  Time In: 0801  Time Out: 5046  Laura Mcdermott, OTR/L    Future Appointments   Date Time Provider Sara Abdi   8/12/2019  8:45 AM Aleksandr Flores PTA [facility]   8/14/2019  8:45 AM Erick Whelan DPT [facility]   8/14/2019  9:30 AM JADA Garland, OTR/L [facility]   8/19/2019  8:45 AM Aleksandr Flores PTA [facility]   8/19/2019  9:30 AM [facility]   8/19/2019 10:15 AM Tammy Das SLP [facility]   8/21/2019  9:30 AM Erick Whelan DPT [facility]   8/21/2019 10:15 AM JADA Garland, OTR/L [facility]   8/21/2019 11:00 AM Tammy Das SLP [facility]   8/26/2019  8:45 AM Tammy Das SLP [facility]   8/26/2019  9:30 AM JADA Garland, OTR/L [facility]   8/26/2019 10:15 AM Erick Whelan DPT [facility]   8/27/2019 10:00 AM CARL XR RF ROOM 3 [facility]   8/28/2019  8:45 AM JADA Garland, OTR/L [facility]   8/28/2019  9:30 AM Erick Whelan DPT [facility]   8/28/2019 10:15 AM Anesthesia Volume In Cc: 3 Cem Waters, SLP [facility] Julianna Vergara Did You Provide Opioid Counseling: No Repair Type: Flap Suturegard Retention Suture: 2-0 Nylon Retention Suture Bite Size: 3 mm Length To Time In Minutes Device Was In Place: 10 Number Of Hemigard Strips Per Side: 1 Simple / Intermediate / Complex Repair - Final Wound Length In Cm: 0 Undermining Type: Entire Wound Debridement Text: The wound edges were debrided prior to proceeding with the closure to facilitate wound healing. Helical Rim Text: The closure involved the helical rim. Vermilion Border Text: The closure involved the vermilion border. Nostril Rim Text: The closure involved the nostril rim. Retention Suture Text: Retention sutures were placed to support the closure and prevent dehiscence. Flap Type: Advancement-Rotation Flap Primary Defect Length (In Cm): 1.2 Secondary Defect Length (In Cm): 4 Skin Substitute: EpiFix Micronized Type Of Previous Surgery (Optional- Ie Mohs Surgery): Mohs Date Of Previous Surgery (Optional): 9/9/2021 Include The Following Details In The Note (If No Details Will Only Be Reflected In The Surgical Fax): Yes Pre-Op Size Of Lesion Removed (Optional): 1.1 Mohs Case Number (Optional): [id number] Date Of Previous Biopsy (Optional): 7/23/2021 Previous Accession (Optional): [id number] Detail Level: Detailed Anesthesia Type: 1% lidocaine with epinephrine Hemostasis: Electrocautery Estimated Blood Loss (Cc): minimal Brow Lift Text: A midfrontal incision was made medially to the defect to allow access to the tissues just superior to the left eyebrow. Following careful dissection inferiorly in a supraperiosteal plane to the level of the left eyebrow, several 3-0 monocryl sutures were used to resuspend the eyebrow orbicularis oculi muscular unit to the superior frontal bone periosteum. This resulted in an appropriate reapproximation of static eyebrow symmetry and correction of the left brow ptosis. Deep Sutures: 4-0 Vicryl Epidermal Sutures: 5-0 Fast Absorbing Gut Wound Care: Vaseline Dressing: pressure dressing with telfa Suturegard Intro: Intraoperative tissue expansion was performed, utilizing the SUTUREGARD device, in order to reduce wound tension. Suturegard Body: The suture ends were repeatedly re-tightened and re-clamped to achieve the desired tissue expansion. Hemigard Intro: Due to skin fragility and wound tension, it was decided to use HEMIGARD adhesive retention suture devices to permit a linear closure. The skin was cleaned and dried for a 6cm distance away from the wound. Excessive hair, if present, was removed to allow for adhesion. Hemigard Postcare Instructions: The HEMIGARD strips are to remain completely dry for at least 5-7 days. Epidermal Closure Graft Donor Site (Optional): running Closure 2 Information: This tab is for additional flaps and grafts, including complex repair and grafts and complex repair and flaps. You can also specify a different location for the additional defect, if the location is the same you do not need to select a new one. We will insert the automated text for the repair you select below just as we do for solitary flaps and grafts. Please note that at this time if you select a location with a different insurance zone you will need to override the ICD10 and CPT if appropriate. Closure 3 Information: This tab is for additional flaps and grafts above and beyond our usual structured repairs. Please note if you enter information here it will not currently bill and you will need to add the billing information manually. Closure 4 Information: This tab is for additional flaps and grafts above and beyond our usual structured repairs.  Please note if you enter information here it will not currently bill and you will need to add the billing information manually. Complex Repair Preamble Text (Leave Blank If You Do Not Want): Extensive wide undermining was performed. Intermediate Repair Preamble Text (Leave Blank If You Do Not Want): Undermining was performed with blunt dissection. Flap Thinning Complex Repair Preamble Text (Leave Blank If You Do Not Want): An incision was made along the previous flap suture line. Undermining was performed beneath the flap and redundant tissue was removed to restore the normal contour of the skin. Non-Graft Cartilage Fenestration Text: The cartilage was fenestrated with a 2mm punch biopsy to help facilitate healing. Graft Cartilage Fenestration Text: The cartilage was fenestrated with a 2mm punch biopsy to help facilitate graft survival and healing. Preparation Of Recipient Site - Flap: The eschar was removed surgically with sharp dissection to facilitate appropriate wound healing of the following adjacent tissue rearrangement. Preparation Of Recipient Site - Graft: The eschar was removed surgically with sharp dissection to facilitate appropriate survival of the following graft. Preparation Of Recipient Site - Flap Takedown: The eschar and granulation tissue was removed surgically with sharp dissection to facilitate appropriate healing after division and inset of the proximal and distal interpolation flap. Secondary Intention Text (Leave Blank If You Do Not Want): The defect will heal with secondary intention. No Repair - Repaired With Adjacent Surgical Defect Text (Leave Blank If You Do Not Want): After obtaining clear surgical margins the defect was repaired concurrently with another surgical defect which was in close approximation. Referred To Oculoplastics For Closure Text (Leave Blank If You Do Not Want): After obtaining clear surgical margins the patient was sent to oculoplastics for surgical repair. The patient understands they will receive post-surgical care and follow-up from the referring physician's office. Referred To Otolaryngology For Closure Text (Leave Blank If You Do Not Want): After obtaining clear surgical margins the patient was sent to otolaryngology for surgical repair. The patient understands they will receive post-surgical care and follow-up from the referring physician's office. Referred To Plastics For Closure Text (Leave Blank If You Do Not Want): After obtaining clear surgical margins the patient was sent to plastics for surgical repair. The patient understands they will receive post-surgical care and follow-up from the referring physician's office. Referred To Asc For Closure Text (Leave Blank If You Do Not Want): After obtaining clear surgical margins the patient was sent to an [facility] for surgical repair. The patient understands they will receive post-surgical care and follow-up from the [facility] physician. Referred To Mid-Level For Closure Text (Leave Blank If You Do Not Want): After obtaining clear surgical margins the patient was sent to a mid-level provider for surgical repair. The patient understands they will receive post-surgical care and follow-up from the mid-level provider. Repair Performed By Another Provider Text (Leave Blank If You Do Not Want): After obtaining clear surgical margins the defect was repaired by another provider. Advancement Flap (Single) Text: After a lengthy discussion with the patient regarding the wound treatment reconstruction options available including but not limited to simple repair, intermediate repair, complex repair, adjacent tissue transfer, tissue graft as well as allowing the lesion to repair by secondary intention. It was determined that due to the defect location, complexity, and given indications; an adjacent tissue transfer (advancement flap) would be the most appropriate means for repair of the surgical defect as the defect extended through the skin into the subcutaneous tissues, and required rearrangement or transfer of adjacent tissue to repair the surgical wound. \\n\\n\\n\\n\\nThe defect edges were debeveled with a #15 scalpel blade. Given the location of the defect and the proximity to free margins a single advancement flap was deemed most appropriate. Using a sterile surgical marker, an appropriate advancement flap was drawn incorporating the defect and placing the expected incisions within the relaxed skin tension lines where possible. The area thus outlined was incised deep to adipose tissue with a #15 scalpel blade. The skin margins were undermined to an appropriate distance in all directions utilizing iris scissors. Advancement Flap (Double) Text: The defect edges were debeveled with a #15 scalpel blade. Given the location of the defect and the proximity to free margins a double advancement flap was deemed most appropriate. Using a sterile surgical marker, the appropriate advancement flaps were drawn incorporating the defect and placing the expected incisions within the relaxed skin tension lines where possible. The area thus outlined was incised deep to adipose tissue with a #15 scalpel blade. The skin margins were undermined to an appropriate distance in all directions utilizing iris scissors. Burow's Advancement Flap Text: The defect edges were debeveled with a #15 scalpel blade. Given the location of the defect and the proximity to free margins a Burow's advancement flap was deemed most appropriate. Using a sterile surgical marker, the appropriate advancement flap was drawn incorporating the defect and placing the expected incisions within the relaxed skin tension lines where possible. The area thus outlined was incised deep to adipose tissue with a #15 scalpel blade. The skin margins were undermined to an appropriate distance in all directions utilizing iris scissors. Chonodrocutaneous Helical Advancement Flap Text: The defect edges were debeveled with a #15 scalpel blade. Given the location of the defect and the proximity to free margins a chondrocutaneous helical advancement flap was deemed most appropriate. Using a sterile surgical marker, the appropriate advancement flap was drawn incorporating the defect and placing the expected incisions within the relaxed skin tension lines where possible. The area thus outlined was incised deep to adipose tissue with a #15 scalpel blade. The skin margins were undermined to an appropriate distance in all directions utilizing iris scissors. Crescentic Advancement Flap Text: The defect edges were debeveled with a #15 scalpel blade. Given the location of the defect and the proximity to free margins a crescentic advancement flap was deemed most appropriate. Using a sterile surgical marker, the appropriate advancement flap was drawn incorporating the defect and placing the expected incisions within the relaxed skin tension lines where possible. The area thus outlined was incised deep to adipose tissue with a #15 scalpel blade. The skin margins were undermined to an appropriate distance in all directions utilizing iris scissors. A-T Advancement Flap Text: The defect edges were debeveled with a #15 scalpel blade. Given the location of the defect, shape of the defect and the proximity to free margins an A-T advancement flap was deemed most appropriate. Using a sterile surgical marker, an appropriate advancement flap was drawn incorporating the defect and placing the expected incisions within the relaxed skin tension lines where possible. The area thus outlined was incised deep to adipose tissue with a #15 scalpel blade. The skin margins were undermined to an appropriate distance in all directions utilizing iris scissors. O-T Advancement Flap Text: The defect edges were debeveled with a #15 scalpel blade. Given the location of the defect, shape of the defect and the proximity to free margins an O-T advancement flap was deemed most appropriate. Using a sterile surgical marker, an appropriate advancement flap was drawn incorporating the defect and placing the expected incisions within the relaxed skin tension lines where possible. The area thus outlined was incised deep to adipose tissue with a #15 scalpel blade. The skin margins were undermined to an appropriate distance in all directions utilizing iris scissors. O-L Flap Text: The defect edges were debeveled with a #15 scalpel blade. Given the location of the defect, shape of the defect and the proximity to free margins an O-L flap was deemed most appropriate. Using a sterile surgical marker, an appropriate advancement flap was drawn incorporating the defect and placing the expected incisions within the relaxed skin tension lines where possible. The area thus outlined was incised deep to adipose tissue with a #15 scalpel blade. The skin margins were undermined to an appropriate distance in all directions utilizing iris scissors. O-Z Flap Text: The defect edges were debeveled with a #15 scalpel blade. Given the location of the defect, shape of the defect and the proximity to free margins an O-Z flap was deemed most appropriate. Using a sterile surgical marker, an appropriate transposition flap was drawn incorporating the defect and placing the expected incisions within the relaxed skin tension lines where possible. The area thus outlined was incised deep to adipose tissue with a #15 scalpel blade. The skin margins were undermined to an appropriate distance in all directions utilizing iris scissors. Double O-Z Flap Text: The defect edges were debeveled with a #15 scalpel blade. Given the location of the defect, shape of the defect and the proximity to free margins a Double O-Z flap was deemed most appropriate. Using a sterile surgical marker, an appropriate transposition flap was drawn incorporating the defect and placing the expected incisions within the relaxed skin tension lines where possible. The area thus outlined was incised deep to adipose tissue with a #15 scalpel blade. The skin margins were undermined to an appropriate distance in all directions utilizing iris scissors. V-Y Flap Text: The defect edges were debeveled with a #15 scalpel blade. Given the location of the defect, shape of the defect and the proximity to free margins a V-Y flap was deemed most appropriate. Using a sterile surgical marker, an appropriate advancement flap was drawn incorporating the defect and placing the expected incisions within the relaxed skin tension lines where possible. The area thus outlined was incised deep to adipose tissue with a #15 scalpel blade. The skin margins were undermined to an appropriate distance in all directions utilizing iris scissors. Advancement-Rotation Flap Text: The defect edges were debeveled with a #15 scalpel blade. Given the location of the defect, shape of the defect and the proximity to free margins an advancement-rotation flap was deemed most appropriate. Using a sterile surgical marker, an appropriate advancement flap was drawn incorporating the defect and placing the expected incisions within the relaxed skin tension lines where possible. The area thus outlined was incised deep to adipose tissue with a #15 scalpel blade. The skin margins were undermined to an appropriate distance in all directions utilizing iris scissors. Mercedes Flap Text: The defect edges were debeveled with a #15 scalpel blade. Given the location of the defect, shape of the defect and the proximity to free margins a Mercedes flap was deemed most appropriate. Using a sterile surgical marker, an appropriate advancement flap was drawn incorporating the defect and placing the expected incisions within the relaxed skin tension lines where possible. The area thus outlined was incised deep to adipose tissue with a #15 scalpel blade. The skin margins were undermined to an appropriate distance in all directions utilizing iris scissors. Modified Advancement Flap Text: The defect edges were debeveled with a #15 scalpel blade. Given the location of the defect, shape of the defect and the proximity to free margins a modified advancement flap was deemed most appropriate. Using a sterile surgical marker, an appropriate advancement flap was drawn incorporating the defect and placing the expected incisions within the relaxed skin tension lines where possible. The area thus outlined was incised deep to adipose tissue with a #15 scalpel blade. The skin margins were undermined to an appropriate distance in all directions utilizing iris scissors. Mucosal Advancement Flap Text: Given the location of the defect, shape of the defect and the proximity to free margins a mucosal advancement flap was deemed most appropriate. Incisions were made with a 15 blade scalpel in the appropriate fashion along the cutaneous vermillion border and the mucosal lip. The remaining actinically damaged mucosal tissue was excised. The mucosal advancement flap was then elevated to the gingival sulcus with care taken to preserve the neurovascular structures and advanced into the primary defect. Care was taken to ensure that precise realignment of the vermillion border was achieved. Peng Advancement Flap Text: The defect edges were debeveled with a #15 scalpel blade. Given the location of the defect, shape of the defect and the proximity to free margins a Peng advancement flap was deemed most appropriate. Using a sterile surgical marker, an appropriate advancement flap was drawn incorporating the defect and placing the expected incisions within the relaxed skin tension lines where possible. The area thus outlined was incised deep to adipose tissue with a #15 scalpel blade. The skin margins were undermined to an appropriate distance in all directions utilizing iris scissors. Hatchet Flap Text: The defect edges were debeveled with a #15 scalpel blade. Given the location of the defect, shape of the defect and the proximity to free margins a hatchet flap was deemed most appropriate. Using a sterile surgical marker, an appropriate hatchet flap was drawn incorporating the defect and placing the expected incisions within the relaxed skin tension lines where possible. The area thus outlined was incised deep to adipose tissue with a #15 scalpel blade. The skin margins were undermined to an appropriate distance in all directions utilizing iris scissors. Rotation Flap Text: The defect edges were debeveled with a #15 scalpel blade. Given the location of the defect, shape of the defect and the proximity to free margins a rotation flap was deemed most appropriate. Using a sterile surgical marker, an appropriate rotation flap was drawn incorporating the defect and placing the expected incisions within the relaxed skin tension lines where possible. The area thus outlined was incised deep to adipose tissue with a #15 scalpel blade. The skin margins were undermined to an appropriate distance in all directions utilizing iris scissors. Spiral Flap Text: The defect edges were debeveled with a #15 scalpel blade. Given the location of the defect, shape of the defect and the proximity to free margins a spiral flap was deemed most appropriate. Using a sterile surgical marker, an appropriate rotation flap was drawn incorporating the defect and placing the expected incisions within the relaxed skin tension lines where possible. The area thus outlined was incised deep to adipose tissue with a #15 scalpel blade. The skin margins were undermined to an appropriate distance in all directions utilizing iris scissors. Staged Advancement Flap Text: The defect edges were debeveled with a #15 scalpel blade. Given the location of the defect, shape of the defect and the proximity to free margins a staged advancement flap was deemed most appropriate. Using a sterile surgical marker, an appropriate advancement flap was drawn incorporating the defect and placing the expected incisions within the relaxed skin tension lines where possible. The area thus outlined was incised deep to adipose tissue with a #15 scalpel blade. The skin margins were undermined to an appropriate distance in all directions utilizing iris scissors. Star Wedge Flap Text: The defect edges were debeveled with a #15 scalpel blade. Given the location of the defect, shape of the defect and the proximity to free margins a star wedge flap was deemed most appropriate. Using a sterile surgical marker, an appropriate rotation flap was drawn incorporating the defect and placing the expected incisions within the relaxed skin tension lines where possible. The area thus outlined was incised deep to adipose tissue with a #15 scalpel blade. The skin margins were undermined to an appropriate distance in all directions utilizing iris scissors. Transposition Flap Text: The defect edges were debeveled with a #15 scalpel blade. Given the location of the defect and the proximity to free margins a transposition flap was deemed most appropriate. Using a sterile surgical marker, an appropriate transposition flap was drawn incorporating the defect. The area thus outlined was incised deep to adipose tissue with a #15 scalpel blade. The skin margins were undermined to an appropriate distance in all directions utilizing iris scissors. Muscle Hinge Flap Text: The defect edges were debeveled with a #15 scalpel blade. Given the size, depth and location of the defect and the proximity to free margins a muscle hinge flap was deemed most appropriate. Using a sterile surgical marker, an appropriate hinge flap was drawn incorporating the defect. The area thus outlined was incised with a #15 scalpel blade. The skin margins were undermined to an appropriate distance in all directions utilizing iris scissors. Mustarde Flap Text: The defect edges were debeveled with a #15 scalpel blade. Given the size, depth and location of the defect and the proximity to free margins a Mustarde flap was deemed most appropriate. Using a sterile surgical marker, an appropriate flap was drawn incorporating the defect. The area thus outlined was incised with a #15 scalpel blade. The skin margins were undermined to an appropriate distance in all directions utilizing iris scissors. Nasal Turnover Hinge Flap Text: The defect edges were debeveled with a #15 scalpel blade. Given the size, depth, location of the defect and the defect being full thickness a nasal turnover hinge flap was deemed most appropriate. Using a sterile surgical marker, an appropriate hinge flap was drawn incorporating the defect. The area thus outlined was incised with a #15 scalpel blade. The flap was designed to recreate the nasal mucosal lining and the alar rim. The skin margins were undermined to an appropriate distance in all directions utilizing iris scissors. Nasalis-Muscle-Based Myocutaneous Island Pedicle Flap Text: Using a #15 blade, an incision was made around the donor flap to the level of the nasalis muscle. Wide lateral undermining was then performed in both the subcutaneous plane above the nasalis muscle, and in a submuscular plane just above periosteum. This allowed the formation of a free nasalis muscle axial pedicle (based on the angular artery) which was still attached to the actual cutaneous flap, increasing its mobility and vascular viability. Hemostasis was obtained with pinpoint electrocoagulation. The flap was mobilized into position and the pivotal anchor points positioned and stabilized with buried interrupted sutures. Subcutaneous and dermal tissues were closed in a multilayered fashion with sutures. Tissue redundancies were excised, and the epidermal edges were apposed without significant tension and sutured with sutures. Orbicularis Oris Muscle Flap Text: The defect edges were debeveled with a #15 scalpel blade. Given that the defect affected the competency of the oral sphincter an obicularis oris muscle flap was deemed most appropriate to restore this competency and normal muscle function. Using a sterile surgical marker, an appropriate flap was drawn incorporating the defect. The area thus outlined was incised with a #15 scalpel blade. Melolabial Transposition Flap Text: The defect edges were debeveled with a #15 scalpel blade. Given the location of the defect and the proximity to free margins a melolabial flap was deemed most appropriate. Using a sterile surgical marker, an appropriate melolabial transposition flap was drawn incorporating the defect. The area thus outlined was incised deep to adipose tissue with a #15 scalpel blade. The skin margins were undermined to an appropriate distance in all directions utilizing iris scissors. Rhombic Flap Text: The defect edges were debeveled with a #15 scalpel blade. Given the location of the defect and the proximity to free margins a rhombic flap was deemed most appropriate. Using a sterile surgical marker, an appropriate rhombic flap was drawn incorporating the defect. The area thus outlined was incised deep to adipose tissue with a #15 scalpel blade. The skin margins were undermined to an appropriate distance in all directions utilizing iris scissors. Rhomboid Transposition Flap Text: The defect edges were debeveled with a #15 scalpel blade. Given the location of the defect and the proximity to free margins a rhomboid transposition flap was deemed most appropriate. Using a sterile surgical marker, an appropriate rhomboid flap was drawn incorporating the defect. The area thus outlined was incised deep to adipose tissue with a #15 scalpel blade. The skin margins were undermined to an appropriate distance in all directions utilizing iris scissors. Bi-Rhombic Flap Text: The defect edges were debeveled with a #15 scalpel blade. Given the location of the defect and the proximity to free margins a bi-rhombic flap was deemed most appropriate. Using a sterile surgical marker, an appropriate rhombic flap was drawn incorporating the defect. The area thus outlined was incised deep to adipose tissue with a #15 scalpel blade. The skin margins were undermined to an appropriate distance in all directions utilizing iris scissors. Helical Rim Advancement Flap Text: The defect edges were debeveled with a #15 blade scalpel. Given the location of the defect and the proximity to free margins (helical rim) a double helical rim advancement flap was deemed most appropriate. Using a sterile surgical marker, the appropriate advancement flaps were drawn incorporating the defect and placing the expected incisions between the helical rim and antihelix where possible. The area thus outlined was incised through and through with a #15 scalpel blade. With a skin hook and iris scissors, the flaps were gently and sharply undermined and freed up. Bilateral Helical Rim Advancement Flap Text: The defect edges were debeveled with a #15 blade scalpel. Given the location of the defect and the proximity to free margins (helical rim) a bilateral helical rim advancement flap was deemed most appropriate. Using a sterile surgical marker, the appropriate advancement flaps were drawn incorporating the defect and placing the expected incisions between the helical rim and antihelix where possible. The area thus outlined was incised through and through with a #15 scalpel blade. With a skin hook and iris scissors, the flaps were gently and sharply undermined and freed up. Ear Star Wedge Flap Text: The defect edges were debeveled with a #15 blade scalpel. Given the location of the defect and the proximity to free margins (helical rim) an ear star wedge flap was deemed most appropriate. Using a sterile surgical marker, the appropriate flap was drawn incorporating the defect and placing the expected incisions between the helical rim and antihelix where possible. The area thus outlined was incised through and through with a #15 scalpel blade. Banner Transposition Flap Text: The defect edges were debeveled with a #15 scalpel blade. Given the location of the defect and the proximity to free margins a Banner transposition flap was deemed most appropriate. Using a sterile surgical marker, an appropriate flap drawn around the defect. The area thus outlined was incised deep to adipose tissue with a #15 scalpel blade. The skin margins were undermined to an appropriate distance in all directions utilizing iris scissors. Bilobed Flap Text: The defect edges were debeveled with a #15 scalpel blade. Given the location of the defect and the proximity to free margins a bilobe flap was deemed most appropriate. Using a sterile surgical marker, an appropriate bilobe flap drawn around the defect. The area thus outlined was incised deep to adipose tissue with a #15 scalpel blade. The skin margins were undermined to an appropriate distance in all directions utilizing iris scissors. Bilobed Transposition Flap Text: The defect edges were debeveled with a #15 scalpel blade. Given the location of the defect and the proximity to free margins a bilobed transposition flap was deemed most appropriate. Using a sterile surgical marker, an appropriate bilobe flap drawn around the defect. The area thus outlined was incised deep to adipose tissue with a #15 scalpel blade. The skin margins were undermined to an appropriate distance in all directions utilizing iris scissors. Trilobed Flap Text: The defect edges were debeveled with a #15 scalpel blade. Given the location of the defect and the proximity to free margins a trilobed flap was deemed most appropriate. Using a sterile surgical marker, an appropriate trilobed flap drawn around the defect. The area thus outlined was incised deep to adipose tissue with a #15 scalpel blade. The skin margins were undermined to an appropriate distance in all directions utilizing iris scissors. Dorsal Nasal Flap Text: The defect edges were debeveled with a #15 scalpel blade. Given the location of the defect and the proximity to free margins a dorsal nasal flap was deemed most appropriate. Using a sterile surgical marker, an appropriate dorsal nasal flap was drawn around the defect. The area thus outlined was incised deep to adipose tissue with a #15 scalpel blade. The skin margins were undermined to an appropriate distance in all directions utilizing iris scissors. Island Pedicle Flap Text: The defect edges were debeveled with a #15 scalpel blade. Given the location of the defect, shape of the defect and the proximity to free margins an island pedicle advancement flap was deemed most appropriate. Using a sterile surgical marker, an appropriate advancement flap was drawn incorporating the defect, outlining the appropriate donor tissue and placing the expected incisions within the relaxed skin tension lines where possible. The area thus outlined was incised deep to adipose tissue with a #15 scalpel blade. The skin margins were undermined to an appropriate distance in all directions around the primary defect and laterally outward around the island pedicle utilizing iris scissors. There was minimal undermining beneath the pedicle flap. Island Pedicle Flap With Canthal Suspension Text: The defect edges were debeveled with a #15 scalpel blade. Given the location of the defect, shape of the defect and the proximity to free margins an island pedicle advancement flap was deemed most appropriate. Using a sterile surgical marker, an appropriate advancement flap was drawn incorporating the defect, outlining the appropriate donor tissue and placing the expected incisions within the relaxed skin tension lines where possible. The area thus outlined was incised deep to adipose tissue with a #15 scalpel blade. The skin margins were undermined to an appropriate distance in all directions around the primary defect and laterally outward around the island pedicle utilizing iris scissors. There was minimal undermining beneath the pedicle flap. A suspension suture was placed in the canthal tendon to prevent tension and prevent ectropion. Alar Island Pedicle Flap Text: The defect edges were debeveled with a #15 scalpel blade. Given the location of the defect, shape of the defect and the proximity to the alar rim an island pedicle advancement flap was deemed most appropriate. Using a sterile surgical marker, an appropriate advancement flap was drawn incorporating the defect, outlining the appropriate donor tissue and placing the expected incisions within the nasal ala running parallel to the alar rim. The area thus outlined was incised with a #15 scalpel blade. The skin margins were undermined minimally to an appropriate distance in all directions around the primary defect and laterally outward around the island pedicle utilizing iris scissors. There was minimal undermining beneath the pedicle flap. Double Island Pedicle Flap Text: The defect edges were debeveled with a #15 scalpel blade. Given the location of the defect, shape of the defect and the proximity to free margins a double island pedicle advancement flap was deemed most appropriate. Using a sterile surgical marker, an appropriate advancement flap was drawn incorporating the defect, outlining the appropriate donor tissue and placing the expected incisions within the relaxed skin tension lines where possible. The area thus outlined was incised deep to adipose tissue with a #15 scalpel blade. The skin margins were undermined to an appropriate distance in all directions around the primary defect and laterally outward around the island pedicle utilizing iris scissors. There was minimal undermining beneath the pedicle flap. Island Pedicle Flap-Requiring Vessel Identification Text: The defect edges were debeveled with a #15 scalpel blade. Given the location of the defect, shape of the defect and the proximity to free margins an island pedicle advancement flap was deemed most appropriate. Using a sterile surgical marker, an appropriate advancement flap was drawn, based on the axial vessel mentioned above, incorporating the defect, outlining the appropriate donor tissue and placing the expected incisions within the relaxed skin tension lines where possible. The area thus outlined was incised deep to adipose tissue with a #15 scalpel blade. The skin margins were undermined to an appropriate distance in all directions around the primary defect and laterally outward around the island pedicle utilizing iris scissors. There was minimal undermining beneath the pedicle flap. Keystone Flap Text: The defect edges were debeveled with a #15 scalpel blade. Given the location of the defect, shape of the defect a keystone flap was deemed most appropriate. Using a sterile surgical marker, an appropriate keystone flap was drawn incorporating the defect, outlining the appropriate donor tissue and placing the expected incisions within the relaxed skin tension lines where possible. The area thus outlined was incised deep to adipose tissue with a #15 scalpel blade. The skin margins were undermined to an appropriate distance in all directions around the primary defect and laterally outward around the flap utilizing iris scissors. O-T Plasty Text: The defect edges were debeveled with a #15 scalpel blade. Given the location of the defect, shape of the defect and the proximity to free margins an O-T plasty was deemed most appropriate. Using a sterile surgical marker, an appropriate O-T plasty was drawn incorporating the defect and placing the expected incisions within the relaxed skin tension lines where possible. The area thus outlined was incised deep to adipose tissue with a #15 scalpel blade. The skin margins were undermined to an appropriate distance in all directions utilizing iris scissors. O-Z Plasty Text: The defect edges were debeveled with a #15 scalpel blade. Given the location of the defect, shape of the defect and the proximity to free margins an O-Z plasty (double transposition flap) was deemed most appropriate. Using a sterile surgical marker, the appropriate transposition flaps were drawn incorporating the defect and placing the expected incisions within the relaxed skin tension lines where possible. The area thus outlined was incised deep to adipose tissue with a #15 scalpel blade. The skin margins were undermined to an appropriate distance in all directions utilizing iris scissors. Hemostasis was achieved with electrocautery. The flaps were then transposed into place, one clockwise and the other counterclockwise, and anchored with interrupted buried subcutaneous sutures. Double O-Z Plasty Text: The defect edges were debeveled with a #15 scalpel blade. Given the location of the defect, shape of the defect and the proximity to free margins a Double O-Z plasty (double transposition flap) was deemed most appropriate. Using a sterile surgical marker, the appropriate transposition flaps were drawn incorporating the defect and placing the expected incisions within the relaxed skin tension lines where possible. The area thus outlined was incised deep to adipose tissue with a #15 scalpel blade. The skin margins were undermined to an appropriate distance in all directions utilizing iris scissors. Hemostasis was achieved with electrocautery. The flaps were then transposed into place, one clockwise and the other counterclockwise, and anchored with interrupted buried subcutaneous sutures. V-Y Plasty Text: The defect edges were debeveled with a #15 scalpel blade. Given the location of the defect, shape of the defect and the proximity to free margins an V-Y advancement flap was deemed most appropriate. Using a sterile surgical marker, an appropriate advancement flap was drawn incorporating the defect and placing the expected incisions within the relaxed skin tension lines where possible. The area thus outlined was incised deep to adipose tissue with a #15 scalpel blade. The skin margins were undermined to an appropriate distance in all directions utilizing iris scissors. H Plasty Text: Given the location of the defect, shape of the defect and the proximity to free margins a H-plasty was deemed most appropriate for repair. Using a sterile surgical marker, the appropriate advancement arms of the H-plasty were drawn incorporating the defect and placing the expected incisions within the relaxed skin tension lines where possible. The area thus outlined was incised deep to adipose tissue with a #15 scalpel blade. The skin margins were undermined to an appropriate distance in all directions utilizing iris scissors. The opposing advancement arms were then advanced into place in opposite direction and anchored with interrupted buried subcutaneous sutures. W Plasty Text: The lesion was extirpated to the level of the fat with a #15 scalpel blade. Given the location of the defect, shape of the defect and the proximity to free margins a W-plasty was deemed most appropriate for repair. Using a sterile surgical marker, the appropriate transposition arms of the W-plasty were drawn incorporating the defect and placing the expected incisions within the relaxed skin tension lines where possible. The area thus outlined was incised deep to adipose tissue with a #15 scalpel blade. The skin margins were undermined to an appropriate distance in all directions utilizing iris scissors. The opposing transposition arms were then transposed into place in opposite direction and anchored with interrupted buried subcutaneous sutures. Z Plasty Text: The lesion was extirpated to the level of the fat with a #15 scalpel blade. Given the location of the defect, shape of the defect and the proximity to free margins a Z-plasty was deemed most appropriate for repair. Using a sterile surgical marker, the appropriate transposition arms of the Z-plasty were drawn incorporating the defect and placing the expected incisions within the relaxed skin tension lines where possible. The area thus outlined was incised deep to adipose tissue with a #15 scalpel blade. The skin margins were undermined to an appropriate distance in all directions utilizing iris scissors. The opposing transposition arms were then transposed into place in opposite direction and anchored with interrupted buried subcutaneous sutures. Zygomaticofacial Flap Text: Given the location of the defect, shape of the defect and the proximity to free margins a zygomaticofacial flap was deemed most appropriate for repair. Using a sterile surgical marker, the appropriate flap was drawn incorporating the defect and placing the expected incisions within the relaxed skin tension lines where possible. The area thus outlined was incised deep to adipose tissue with a #15 scalpel blade with preservation of a vascular pedicle. The skin margins were undermined to an appropriate distance in all directions utilizing iris scissors. The flap was then placed into the defect and anchored with interrupted buried subcutaneous sutures. Cheek Interpolation Flap Text: A decision was made to reconstruct the defect utilizing an interpolation axial flap and a staged reconstruction. A telfa template was made of the defect. This telfa template was then used to outline the Cheek Interpolation flap. The donor area for the pedicle flap was then injected with anesthesia. The flap was excised through the skin and subcutaneous tissue down to the layer of the underlying musculature. The interpolation flap was carefully excised within this deep plane to maintain its blood supply. The edges of the donor site were undermined. The donor site was closed in a primary fashion. The pedicle was then rotated into position and sutured. Once the tube was sutured into place, adequate blood supply was confirmed with blanching and refill. The pedicle was then wrapped with xeroform gauze and dressed appropriately with a telfa and gauze bandage to ensure continued blood supply and protect the attached pedicle. Cheek-To-Nose Interpolation Flap Text: A decision was made to reconstruct the defect utilizing an interpolation axial flap and a staged reconstruction. A telfa template was made of the defect. This telfa template was then used to outline the Cheek-To-Nose Interpolation flap. The donor area for the pedicle flap was then injected with anesthesia. The flap was excised through the skin and subcutaneous tissue down to the layer of the underlying musculature. The interpolation flap was carefully excised within this deep plane to maintain its blood supply. The edges of the donor site were undermined. The donor site was closed in a primary fashion. The pedicle was then rotated into position and sutured. Once the tube was sutured into place, adequate blood supply was confirmed with blanching and refill. The pedicle was then wrapped with xeroform gauze and dressed appropriately with a telfa and gauze bandage to ensure continued blood supply and protect the attached pedicle. Interpolation Flap Text: A decision was made to reconstruct the defect utilizing an interpolation axial flap and a staged reconstruction. A telfa template was made of the defect. This telfa template was then used to outline the interpolation flap. The donor area for the pedicle flap was then injected with anesthesia. The flap was excised through the skin and subcutaneous tissue down to the layer of the underlying musculature. The interpolation flap was carefully excised within this deep plane to maintain its blood supply. The edges of the donor site were undermined. The donor site was closed in a primary fashion. The pedicle was then rotated into position and sutured. Once the tube was sutured into place, adequate blood supply was confirmed with blanching and refill. The pedicle was then wrapped with xeroform gauze and dressed appropriately with a telfa and gauze bandage to ensure continued blood supply and protect the attached pedicle. Melolabial Interpolation Flap Text: A decision was made to reconstruct the defect utilizing an interpolation axial flap and a staged reconstruction. A telfa template was made of the defect. This telfa template was then used to outline the melolabial interpolation flap. The donor area for the pedicle flap was then injected with anesthesia. The flap was excised through the skin and subcutaneous tissue down to the layer of the underlying musculature. The pedicle flap was carefully excised within this deep plane to maintain its blood supply. The edges of the donor site were undermined. The donor site was closed in a primary fashion. The pedicle was then rotated into position and sutured. Once the tube was sutured into place, adequate blood supply was confirmed with blanching and refill. The pedicle was then wrapped with xeroform gauze and dressed appropriately with a telfa and gauze bandage to ensure continued blood supply and protect the attached pedicle. Mastoid Interpolation Flap Text: A decision was made to reconstruct the defect utilizing an interpolation axial flap and a staged reconstruction. A telfa template was made of the defect. This telfa template was then used to outline the mastoid interpolation flap. The donor area for the pedicle flap was then injected with anesthesia. The flap was excised through the skin and subcutaneous tissue down to the layer of the underlying musculature. The pedicle flap was carefully excised within this deep plane to maintain its blood supply. The edges of the donor site were undermined. The donor site was closed in a primary fashion. The pedicle was then rotated into position and sutured. Once the tube was sutured into place, adequate blood supply was confirmed with blanching and refill. The pedicle was then wrapped with xeroform gauze and dressed appropriately with a telfa and gauze bandage to ensure continued blood supply and protect the attached pedicle. Posterior Auricular Interpolation Flap Text: A decision was made to reconstruct the defect utilizing an interpolation axial flap and a staged reconstruction. A telfa template was made of the defect. This telfa template was then used to outline the posterior auricular interpolation flap. The donor area for the pedicle flap was then injected with anesthesia. The flap was excised through the skin and subcutaneous tissue down to the layer of the underlying musculature. The pedicle flap was carefully excised within this deep plane to maintain its blood supply. The edges of the donor site were undermined. The donor site was closed in a primary fashion. The pedicle was then rotated into position and sutured. Once the tube was sutured into place, adequate blood supply was confirmed with blanching and refill. The pedicle was then wrapped with xeroform gauze and dressed appropriately with a telfa and gauze bandage to ensure continued blood supply and protect the attached pedicle. Paramedian Forehead Flap Text: A decision was made to reconstruct the defect utilizing an interpolation axial flap and a staged reconstruction. A telfa template was made of the defect. This telfa template was then used to outline the paramedian forehead pedicle flap. The donor area for the pedicle flap was then injected with anesthesia. The flap was excised through the skin and subcutaneous tissue down to the layer of the underlying musculature. The pedicle flap was carefully excised within this deep plane to maintain its blood supply. The edges of the donor site were undermined. The donor site was closed in a primary fashion. The pedicle was then rotated into position and sutured. Once the tube was sutured into place, adequate blood supply was confirmed with blanching and refill. The pedicle was then wrapped with xeroform gauze and dressed appropriately with a telfa and gauze bandage to ensure continued blood supply and protect the attached pedicle. Cheiloplasty (Less Than 50%) Text: A decision was made to reconstruct the defect with a  cheiloplasty. The defect was undermined extensively. Additional obicularis oris muscle was excised with a 15 blade scalpel. The defect was converted into a full thickness wedge, of less than 50% of the vertical height of the lip, to facilite a better cosmetic result. Small vessels were then tied off with 5-0 monocyrl. The obicularis oris, superficial fascia, adipose and dermis were then reapproximated. After the deeper layers were approximated the epidermis was reapproximated with particular care given to realign the vermillion border. Cheiloplasty (Complex) Text: A decision was made to reconstruct the defect with a  cheiloplasty. The defect was undermined extensively. Additional obicularis oris muscle was excised with a 15 blade scalpel. The defect was converted into a full thickness wedge to facilite a better cosmetic result. Small vessels were then tied off with 5-0 monocyrl. The obicularis oris, superficial fascia, adipose and dermis were then reapproximated. After the deeper layers were approximated the epidermis was reapproximated with particular care given to realign the vermillion border. Ear Wedge Repair Text: A wedge excision was completed by carrying down an excision through the full thickness of the ear and cartilage with an inward facing Burow's triangle. The wound was then closed in a layered fashion. Full Thickness Lip Wedge Repair (Flap) Text: Given the location of the defect and the proximity to free margins a full thickness wedge repair was deemed most appropriate. Using a sterile surgical marker, the appropriate repair was drawn incorporating the defect and placing the expected incisions perpendicular to the vermilion border. The vermilion border was also meticulously outlined to ensure appropriate reapproximation during the repair. The area thus outlined was incised through and through with a #15 scalpel blade. The muscularis and dermis were reaproximated with deep sutures following hemostasis. Care was taken to realign the vermilion border before proceeding with the superficial closure. Once the vermilion was realigned the superfical and mucosal closure was finished. Ftsg Text: The defect edges were debeveled with a #15 scalpel blade. Given the location of the defect, shape of the defect and the proximity to free margins a full thickness skin graft was deemed most appropriate. Using a sterile surgical marker, the primary defect shape was transferred to the donor site. The area thus outlined was incised deep to adipose tissue with a #15 scalpel blade. The harvested graft was then trimmed of adipose tissue until only dermis and epidermis was left. The skin margins of the secondary defect were undermined to an appropriate distance in all directions utilizing iris scissors. The secondary defect was closed with interrupted buried subcutaneous sutures. The skin edges were then re-apposed with running  sutures. The skin graft was then placed in the primary defect and oriented appropriately. Split-Thickness Skin Graft Text: The defect edges were debeveled with a #15 scalpel blade. Given the location of the defect, shape of the defect and the proximity to free margins a split thickness skin graft was deemed most appropriate. Using a sterile surgical marker, the primary defect shape was transferred to the donor site. The split thickness graft was then harvested. The skin graft was then placed in the primary defect and oriented appropriately. Burow's Graft Text: The defect edges were debeveled with a #15 scalpel blade. Given the location of the defect, shape of the defect, the proximity to free margins and the presence of a standing cone deformity a Burow's skin graft was deemed most appropriate. The standing cone was removed and this tissue was then trimmed to the shape of the primary defect. The adipose tissue was also removed until only dermis and epidermis were left. The skin margins of the secondary defect were undermined to an appropriate distance in all directions utilizing iris scissors. The secondary defect was closed with interrupted buried subcutaneous sutures. The skin edges were then re-apposed with running  sutures. The skin graft was then placed in the primary defect and oriented appropriately. Cartilage Graft Text: The defect edges were debeveled with a #15 scalpel blade. Given the location of the defect, shape of the defect, the fact the defect involved a full thickness cartilage defect a cartilage graft was deemed most appropriate. An appropriate donor site was identified, cleansed, and anesthetized. The cartilage graft was then harvested and transferred to the recipient site, oriented appropriately and then sutured into place. The secondary defect was then repaired using a primary closure. Composite Graft Text: The defect edges were debeveled with a #15 scalpel blade. Given the location of the defect, shape of the defect, the proximity to free margins and the fact the defect was full thickness a composite graft was deemed most appropriate. The defect was outline and then transferred to the donor site. A full thickness graft was then excised from the donor site. The graft was then placed in the primary defect, oriented appropriately and then sutured into place. The secondary defect was then repaired using a primary closure. Epidermal Autograft Text: The defect edges were debeveled with a #15 scalpel blade. Given the location of the defect, shape of the defect and the proximity to free margins an epidermal autograft was deemed most appropriate. Using a sterile surgical marker, the primary defect shape was transferred to the donor site. The epidermal graft was then harvested. The skin graft was then placed in the primary defect and oriented appropriately. Dermal Autograft Text: The defect edges were debeveled with a #15 scalpel blade. Given the location of the defect, shape of the defect and the proximity to free margins a dermal autograft was deemed most appropriate. Using a sterile surgical marker, the primary defect shape was transferred to the donor site. The area thus outlined was incised deep to adipose tissue with a #15 scalpel blade. The harvested graft was then trimmed of adipose and epidermal tissue until only dermis was left. The skin graft was then placed in the primary defect and oriented appropriately. Skin Substitute Text: The defect edges were debeveled with a #15 scalpel blade. Given the location of the defect, shape of the defect and the proximity to free margins a skin substitute graft was deemed most appropriate. The graft material was trimmed to fit the size of the defect. The graft was then placed in the primary defect and oriented appropriately. Skin Substitute Paste Text: The defect edges were debeveled with a #15 scalpel blade. Given the location of the defect, shape of the defect and the proximity to free margins a skin substitute micronized graft was deemed most appropriate. The entire vial contents were admixed with 0.5ccs of sterile saline, formed into a paste and then evenly spread over the entire wound bed. Skin Substitute Injection Text: The defect edges were debeveled with a #15 scalpel blade. Given the location of the defect, shape of the defect and the proximity to free margins a skin substitute micronized graft was deemed most appropriate. The entire vial contents were admixed with 3.0ccs of sterile saline and then injected subcutaneously throughout the entire wound bed. Tissue Cultured Epidermal Autograft Text: The defect edges were debeveled with a #15 scalpel blade. Given the location of the defect, shape of the defect and the proximity to free margins a tissue cultured epidermal autograft was deemed most appropriate. The graft was then trimmed to fit the size of the defect. The graft was then placed in the primary defect and oriented appropriately. Xenograft Text: The defect edges were debeveled with a #15 scalpel blade. Given the location of the defect, shape of the defect and the proximity to free margins a xenograft was deemed most appropriate. The graft was then trimmed to fit the size of the defect. The graft was then placed in the primary defect and oriented appropriately. Purse String (Simple) Text: Given the location of the defect and the characteristics of the surrounding skin a pursestring closure was deemed most appropriate. Undermining was performed circumfirentially around the surgical defect. A purstring suture was then placed and tightened. Purse String (Intermediate) Text: Given the location of the defect and the characteristics of the surrounding skin a pursestring intermediate closure was deemed most appropriate. Undermining was performed circumfirentially around the surgical defect. A purstring suture was then placed and tightened. Partial Purse String (Simple) Text: Given the location of the defect and the characteristics of the surrounding skin a simple purse string closure was deemed most appropriate. Undermining was performed circumfirentially around the surgical defect. A purse string suture was then placed and tightened. Wound tension only allowed a partial closure of the circular defect. Partial Purse String (Intermediate) Text: Given the location of the defect and the characteristics of the surrounding skin an intermediate purse string closure was deemed most appropriate. Undermining was performed circumfirentially around the surgical defect. A purse string suture was then placed and tightened. Wound tension only allowed a partial closure of the circular defect. Localized Dermabrasion Text: The patient was draped in routine manner. Localized dermabrasion using 3 x 17 mm wire brush was performed in routine manner to papillary dermis. This spot dermabrasion is being performed to complete skin cancer reconstruction. It also will eliminate the other sun damaged precancerous cells that are known to be part of the regional effect of a lifetime's worth of sun exposure. This localized dermabrasion is therapeutic and should not be considered cosmetic in any regard. Tarsorrhaphy Text: A tarsorrhaphy was performed using Frost sutures. Complex Repair And Flap Additional Text (Will Appearing After The Standard Complex Repair Text): The complex repair was not sufficient to completely close the primary defect. The remaining additional defect was repaired with the flap mentioned below. Complex Repair And Graft Additional Text (Will Appearing After The Standard Complex Repair Text): The complex repair was not sufficient to completely close the primary defect. The remaining additional defect was repaired with the graft mentioned below. Unique Flap 1 Name: Double Crescentic Advancement Flap Unique Flap 2 Name: Modified Advancement Flap Unique Flap 3 Name: Mercedes Flap Unique Flap 1 Text: The defect edges were debeveled with a #15 scalpel blade. Given the location of the defect and the proximity to free margins a double crescentic advancement flap was deemed most appropriate. Using a sterile surgical marker, the appropriate advancement flap was drawn incorporating the defect and placing the expected incisions within the relaxed skin tension lines where possible. The area thus outlined was incised deep to adipose tissue with a #15 scalpel blade. The skin margins were undermined to an appropriate distance in all directions utilizing iris scissors. Unique Flap 2 Text: The choice of the repair was performed to preserve the functional anatomy and to preserve\\nnormal anatomy. The defect edges were debeveled with a #15 scalpel blade. Given the location of the defect,\\nshape of the defect and the proximity to free margins a modified advancement flap was deemed most appropriate. Using a sterile surgical marker, an appropriate advancement flap was drawn incorporating the defect and placing the expected incisions within the relaxed skin tension lines where possible. The area thus outlined was incised deep to adipose tissue with a #15 scalpel blade. The skin margins were undermined to an appropriate distance in all directions utilizing iris scissors. Unique Flap 3 Text: The defect edges were debeveled with a #15 scalpel blade. Given the location of the defect and the proximity to free margins a Mercedes flap was deemed most appropriate. Using a sterile surgical marker, the appropriate advancement flap was drawn incorporating the defect and placing the expected incisions within the relaxed skin tension lines where possible. The area thus outlined was incised deep to adipose tissue with a #15 scalpel blade. The skin margins were undermined to an appropriate distance in all directions utilizing iris scissors. Cheek Interpolation Flap Division And Inset Text: Division and inset of the cheek interpolation flap was performed to achieve optimal aesthetic result, restore normal anatomic appearance and avoid distortion of normal anatomy, expedite and facilitate wound healing, achieve optimal functional result and because linear closure either not possible or would produce suboptimal result. The patient was prepped and draped in the usual manner. The pedicle was infiltrated with local anesthesia. The pedicle was sectioned with a #15 blade. The pedicle was de-bulked and trimmed to match the shape of the defect. Hemostasis was achieved. The flap donor site and free margin of the flap were secured with deep buried sutures and the wound edges were re-approximated. Cheek To Nose Interpolation Flap Division And Inset Text: Division and inset of the cheek to nose interpolation flap was performed to achieve optimal aesthetic result, restore normal anatomic appearance and avoid distortion of normal anatomy, expedite and facilitate wound healing, achieve optimal functional result and because linear closure either not possible or would produce suboptimal result. The patient was prepped and draped in the usual manner. The pedicle was infiltrated with local anesthesia. The pedicle was sectioned with a #15 blade. The pedicle was de-bulked and trimmed to match the shape of the defect. Hemostasis was achieved. The flap donor site and free margin of the flap were secured with deep buried sutures and the wound edges were re-approximated. Melolabial Interpolation Flap Division And Inset Text: Division and inset of the melolabial interpolation flap was performed to achieve optimal aesthetic result, restore normal anatomic appearance and avoid distortion of normal anatomy, expedite and facilitate wound healing, achieve optimal functional result and because linear closure either not possible or would produce suboptimal result. The patient was prepped and draped in the usual manner. The pedicle was infiltrated with local anesthesia. The pedicle was sectioned with a #15 blade. The pedicle was de-bulked and trimmed to match the shape of the defect. Hemostasis was achieved. The flap donor site and free margin of the flap were secured with deep buried sutures and the wound edges were re-approximated. Mastoid Interpolation Flap Division And Inset Text: Division and inset of the mastoid interpolation flap was performed to achieve optimal aesthetic result, restore normal anatomic appearance and avoid distortion of normal anatomy, expedite and facilitate wound healing, achieve optimal functional result and because linear closure either not possible or would produce suboptimal result. The patient was prepped and draped in the usual manner. The pedicle was infiltrated with local anesthesia. The pedicle was sectioned with a #15 blade. The pedicle was de-bulked and trimmed to match the shape of the defect. Hemostasis was achieved. The flap donor site and free margin of the flap were secured with deep buried sutures and the wound edges were re-approximated. Paramedian Forehead Flap Division And Inset Text: Division and inset of the paramedian forehead flap was performed to achieve optimal aesthetic result, restore normal anatomic appearance and avoid distortion of normal anatomy, expedite and facilitate wound healing, achieve optimal functional result and because linear closure either not possible or would produce suboptimal result. The patient was prepped and draped in the usual manner. The pedicle was infiltrated with local anesthesia. The pedicle was sectioned with a #15 blade. The pedicle was de-bulked and trimmed to match the shape of the defect. Hemostasis was achieved. The flap donor site and free margin of the flap were secured with deep buried sutures and the wound edges were re-approximated. Posterior Auricular Interpolation Flap Division And Inset Text: Division and inset of the posterior auricular interpolation flap was performed to achieve optimal aesthetic result, restore normal anatomic appearance and avoid distortion of normal anatomy, expedite and facilitate wound healing, achieve optimal functional result and because linear closure either not possible or would produce suboptimal result. The patient was prepped and draped in the usual manner. The pedicle was infiltrated with local anesthesia. The pedicle was sectioned with a #15 blade. The pedicle was de-bulked and trimmed to match the shape of the defect. Hemostasis was achieved. The flap donor site and free margin of the flap were secured with deep buried sutures and the wound edges were re-approximated. Manual Repair Warning Statement: We plan on removing the manually selected variable below in favor of our much easier automatic structured text blocks found in the previous tab. We decided to do this to help make the flow better and give you the full power of structured data. Manual selection is never going to be ideal in our platform and I would encourage you to avoid using manual selection from this point on, especially since I will be sunsetting this feature. It is important that you do one of two things with the customized text below. First, you can save all of the text in a word file so you can have it for future reference. Second, transfer the text to the appropriate area in the Library tab. Lastly, if there is a flap or graft type which we do not have you need to let us know right away so I can add it in before the variable is hidden. No need to panic, we plan to give you roughly 6 months to make the change. Consent: The rationale for Repairs was explained to the patient and consent was obtained. The risks, benefits and alternatives to therapy were discussed in detail. Specifically, the risks of infection, scarring, bleeding, prolonged wound healing, incomplete removal, allergy to anesthesia, nerve injury and recurrence were addressed. Prior to the procedure, the treatment site was clearly identified and confirmed by the patient. All components of Universal Protocol/PAUSE Rule completed. Post-Care Instructions: WOUND CARE INSTRUCTIONS\\n\\n\\nI reviewed the post-care instructions with the patient in detail. \\n\\nKeep our initial bandage on and dry for 48-72 hours as directed. After 48-72 hours,\\n1. Cleanse the wound with peroxide gently. If there is a lot of crusting/scabbing, soak the site with peroxide to soften. \\n2. Apply a generous amount of Vaseline to the surgical site. DO NOT use Neosporin. \\n3. Cover the wound with a dressing. You can use a non-stick pad with paper tape or regular bandages. \\n4. Repeat twice daily, once in the morning, once in the evening. \\n\\n\\nPAIN CONTROL\\n1. It is common to experience swelling, bruising, and drainage after surgery. To decrease pain, use extra strength Tylenol as directed on the bottle. Please do not use ibuprofen, Aleve, Motrin, or Excedrin as they may worsen bleeding. If Tylenol does not help with your pain, please call our office. Certain pain medications may require you to come to the office to  an actual paper prescription. Other intermediate (non-narcotic) strength pain medications may be called in for you if necessary. \\n\\n2. To decrease pain, patients can also try using an ice pack, a bag of frozen green peas, or a bag of frozen marshmellows. Place the ice pack on top of the bandage for 20-30 minutes, then take a break for 20-30 minutes (place the ice pack back in the freezer to get it cold again). Repeat as many times as necessary. \\n\\n\\nBLEEDING CONTROL\\nIf bleeding should occur, apply firm direct pressure to the site for 30 minutes without easing up. If bleeding continues after 30 minutes, please call the office at any time. In rare instances, it may be necessary to go to the nearest emergency room. \\n\\n\\nMISCELLANEOUS INFORMATION\\nThings to avoid while healing:\\n - NO heavy lifting, exercise, or swimming for the next 14 days. \\n - NO exposure to tap water for the next 48-72 hours. \\n - NO exposure to hot tub, swimming pool, or ocean water for the next 14 days. \\n\\n\\nCONTACT INFORMATION\\nShould you have any questions or concerns, please call:\\n\\n1. [address] [facility] Location = [phone]\\n\\n2.  [address] = [phone] Pain Refusal Text: I offered to prescribe pain medication but the patient refused to take this medication. Where Do You Want The Question To Include Opioid Counseling Located?: Case Summary Tab Information: Selecting Yes will display possible errors in your note based on the variables you have selected. This validation is only offered as a suggestion for you. PLEASE NOTE THAT THE VALIDATION TEXT WILL BE REMOVED WHEN YOU FINALIZE YOUR NOTE. IF YOU WANT TO FAX A PRELIMINARY NOTE YOU WILL NEED TO TOGGLE THIS TO 'NO' IF YOU DO NOT WANT IT IN YOUR FAXED NOTE.